# Patient Record
Sex: FEMALE | Race: OTHER | NOT HISPANIC OR LATINO | URBAN - METROPOLITAN AREA
[De-identification: names, ages, dates, MRNs, and addresses within clinical notes are randomized per-mention and may not be internally consistent; named-entity substitution may affect disease eponyms.]

---

## 2017-07-28 ENCOUNTER — OUTPATIENT (OUTPATIENT)
Dept: OUTPATIENT SERVICES | Facility: HOSPITAL | Age: 57
LOS: 1 days | End: 2017-07-28
Payer: MEDICAID

## 2017-07-28 VITALS
WEIGHT: 242.95 LBS | SYSTOLIC BLOOD PRESSURE: 146 MMHG | OXYGEN SATURATION: 96 % | RESPIRATION RATE: 17 BRPM | HEIGHT: 66 IN | DIASTOLIC BLOOD PRESSURE: 87 MMHG | HEART RATE: 62 BPM | TEMPERATURE: 98 F

## 2017-07-28 DIAGNOSIS — Z01.818 ENCOUNTER FOR OTHER PREPROCEDURAL EXAMINATION: ICD-10-CM

## 2017-07-28 DIAGNOSIS — M75.112 INCOMPLETE ROTATOR CUFF TEAR OR RUPTURE OF LEFT SHOULDER, NOT SPECIFIED AS TRAUMATIC: ICD-10-CM

## 2017-07-28 DIAGNOSIS — H33.21 SEROUS RETINAL DETACHMENT, RIGHT EYE: Chronic | ICD-10-CM

## 2017-07-28 DIAGNOSIS — Z98.89 OTHER SPECIFIED POSTPROCEDURAL STATES: Chronic | ICD-10-CM

## 2017-07-28 DIAGNOSIS — Z98.890 OTHER SPECIFIED POSTPROCEDURAL STATES: Chronic | ICD-10-CM

## 2017-07-28 PROCEDURE — 71046 X-RAY EXAM CHEST 2 VIEWS: CPT

## 2017-07-28 PROCEDURE — G0463: CPT

## 2017-07-28 PROCEDURE — 71020: CPT | Mod: 26

## 2017-07-28 NOTE — H&P PST ADULT - FAMILY HISTORY
Father  Still living? No  Family history of brain cancer, Age at diagnosis: Age Unknown     Mother  Still living? Yes, Estimated age: Age Unknown  Diabetes mellitus, type 2, Age at diagnosis: Age Unknown  Family history of hypertension, Age at diagnosis: Age Unknown

## 2017-07-28 NOTE — H&P PST ADULT - MS GEN HX ROS MEA POS PC
stiffness/myalgia/muscle weakness/muscle cramps/arthralgia/arthritis/left shoulder/joint swelling/joint pain

## 2017-07-28 NOTE — H&P PST ADULT - HISTORY OF PRESENT ILLNESS
56years old female with pmhx of ALKA on CPAP settings at 10, hypertension, hyperlipidemia, GERD, Obesity, Migraine Headaches,  multiple TIA's (x3, last one 3/2015) and ex cigarette smoker (10months) presents today with c/o of severe pain in left shoulder x 2years. " I don't know what to do to stop bone spurs that make me keep coming in to have more sugeries". Patient is here today for presurgical testing for scheduled left shoulder arthroscopy on 8/8/17

## 2017-07-28 NOTE — H&P PST ADULT - PMH
Anxiety    Bursitis    Carpal tunnel syndrome on left    Carpal tunnel syndrome, bilateral    Cataract    Depression    Hiatal hernia    HTN (hypertension)    Hyperlipemia    Insomnia    Migraine aura without headache    Morbidly obese    Ovarian cyst    Right retinal detachment    Rotator cuff tear, left    Sleep apnea    Stress incontinence, female    TIA (transient ischemic attack)  with slurred speach and blurred vision , possible migraines, pt under neurology care

## 2017-07-28 NOTE — H&P PST ADULT - PSH
H/O nasal septoplasty  2014  History of carpal tunnel surgery of left wrist  2/2017  History of hysterectomy  1994  History of shoulder surgery  left shoulder arthroscopy- 2010  History of total knee replacement  2011  RD (retinal detachment), right    S/P hernia repair  incarcerated hernia with mesh- May 2015  Status post rotator cuff repair  Left rotator 7/2016

## 2017-07-28 NOTE — H&P PST ADULT - NEGATIVE CARDIOVASCULAR SYMPTOMS
no claudication/no paroxysmal nocturnal dyspnea/no chest pain/no orthopnea/no dyspnea on exertion/no palpitations/no peripheral edema

## 2017-08-07 RX ORDER — SODIUM CHLORIDE 9 MG/ML
3 INJECTION INTRAMUSCULAR; INTRAVENOUS; SUBCUTANEOUS EVERY 8 HOURS
Qty: 0 | Refills: 0 | Status: DISCONTINUED | OUTPATIENT
Start: 2017-08-08 | End: 2017-08-08

## 2017-08-08 ENCOUNTER — OUTPATIENT (OUTPATIENT)
Dept: OUTPATIENT SERVICES | Facility: HOSPITAL | Age: 57
LOS: 1 days | Discharge: HOME | End: 2017-08-08
Payer: MEDICAID

## 2017-08-08 ENCOUNTER — TRANSCRIPTION ENCOUNTER (OUTPATIENT)
Age: 57
End: 2017-08-08

## 2017-08-08 ENCOUNTER — RESULT REVIEW (OUTPATIENT)
Age: 57
End: 2017-08-08

## 2017-08-08 VITALS
RESPIRATION RATE: 16 BRPM | HEART RATE: 74 BPM | DIASTOLIC BLOOD PRESSURE: 87 MMHG | SYSTOLIC BLOOD PRESSURE: 139 MMHG | OXYGEN SATURATION: 95 % | TEMPERATURE: 98 F

## 2017-08-08 VITALS
HEIGHT: 65 IN | TEMPERATURE: 98 F | SYSTOLIC BLOOD PRESSURE: 156 MMHG | WEIGHT: 242.95 LBS | DIASTOLIC BLOOD PRESSURE: 82 MMHG | HEART RATE: 70 BPM | OXYGEN SATURATION: 99 % | RESPIRATION RATE: 14 BRPM

## 2017-08-08 DIAGNOSIS — Z98.890 OTHER SPECIFIED POSTPROCEDURAL STATES: Chronic | ICD-10-CM

## 2017-08-08 DIAGNOSIS — M75.112 INCOMPLETE ROTATOR CUFF TEAR OR RUPTURE OF LEFT SHOULDER, NOT SPECIFIED AS TRAUMATIC: ICD-10-CM

## 2017-08-08 DIAGNOSIS — Z98.89 OTHER SPECIFIED POSTPROCEDURAL STATES: Chronic | ICD-10-CM

## 2017-08-08 DIAGNOSIS — H33.21 SEROUS RETINAL DETACHMENT, RIGHT EYE: Chronic | ICD-10-CM

## 2017-08-08 DIAGNOSIS — Z01.818 ENCOUNTER FOR OTHER PREPROCEDURAL EXAMINATION: ICD-10-CM

## 2017-08-08 PROCEDURE — 29828 SHO ARTHRS SRG BICP TENODSIS: CPT | Mod: LT

## 2017-08-08 PROCEDURE — 88304 TISSUE EXAM BY PATHOLOGIST: CPT

## 2017-08-08 PROCEDURE — 29823 SHO ARTHRS SRG XTNSV DBRDMT: CPT | Mod: AS,59

## 2017-08-08 PROCEDURE — 29821 SHO ARTHRS SRG COMPL SYNVCT: CPT | Mod: AS,59

## 2017-08-08 PROCEDURE — 29827 SHO ARTHRS SRG RT8TR CUF RPR: CPT | Mod: LT

## 2017-08-08 PROCEDURE — 29807 SHO ARTHRS SRG RPR SLAP LES: CPT | Mod: LT

## 2017-08-08 PROCEDURE — 29826 SHO ARTHRS SRG DECOMPRESSION: CPT | Mod: AS,LT

## 2017-08-08 PROCEDURE — 29826 SHO ARTHRS SRG DECOMPRESSION: CPT | Mod: LT

## 2017-08-08 PROCEDURE — 88304 TISSUE EXAM BY PATHOLOGIST: CPT | Mod: 26

## 2017-08-08 PROCEDURE — 23440 REMOVE/TRANSPLANT TENDON: CPT | Mod: AS,59

## 2017-08-08 PROCEDURE — 29820 SHO ARTHRS SRG PRTL SYNVCT: CPT | Mod: XS,LT

## 2017-08-08 RX ORDER — SODIUM CHLORIDE 9 MG/ML
1000 INJECTION, SOLUTION INTRAVENOUS
Qty: 0 | Refills: 0 | Status: DISCONTINUED | OUTPATIENT
Start: 2017-08-08 | End: 2017-08-16

## 2017-08-08 RX ORDER — ACETAMINOPHEN 500 MG
1000 TABLET ORAL ONCE
Qty: 0 | Refills: 0 | Status: COMPLETED | OUTPATIENT
Start: 2017-08-08 | End: 2017-08-08

## 2017-08-08 RX ORDER — ONDANSETRON 8 MG/1
4 TABLET, FILM COATED ORAL EVERY 6 HOURS
Qty: 0 | Refills: 0 | Status: DISCONTINUED | OUTPATIENT
Start: 2017-08-08 | End: 2017-08-16

## 2017-08-08 RX ORDER — OXYCODONE HYDROCHLORIDE 5 MG/1
1 TABLET ORAL
Qty: 40 | Refills: 0
Start: 2017-08-08

## 2017-08-08 RX ORDER — HYDROMORPHONE HYDROCHLORIDE 2 MG/ML
0.5 INJECTION INTRAMUSCULAR; INTRAVENOUS; SUBCUTANEOUS
Qty: 0 | Refills: 0 | Status: DISCONTINUED | OUTPATIENT
Start: 2017-08-08 | End: 2017-08-08

## 2017-08-08 RX ORDER — ONDANSETRON 8 MG/1
4 TABLET, FILM COATED ORAL ONCE
Qty: 0 | Refills: 0 | Status: DISCONTINUED | OUTPATIENT
Start: 2017-08-08 | End: 2017-08-08

## 2017-08-08 RX ADMIN — SODIUM CHLORIDE 100 MILLILITER(S): 9 INJECTION, SOLUTION INTRAVENOUS at 12:50

## 2017-08-08 RX ADMIN — Medication 1000 MILLIGRAM(S): at 12:53

## 2017-08-08 RX ADMIN — Medication 400 MILLIGRAM(S): at 12:23

## 2017-08-08 RX ADMIN — SODIUM CHLORIDE 3 MILLILITER(S): 9 INJECTION INTRAMUSCULAR; INTRAVENOUS; SUBCUTANEOUS at 07:59

## 2017-08-08 NOTE — BRIEF OPERATIVE NOTE - OPERATION/FINDINGS
Pre-op Diagnosis: LEFT SHOULDER LABRUM TEAR/BICEPS TEAR  Post-op Diagnosis: SAME    LEFT Shoulder arthroscopic debridement of labrum tears  Subacromial decompression  Acromioplasty

## 2017-08-08 NOTE — ASU DISCHARGE PLAN (ADULT/PEDIATRIC). - MEDICATION SUMMARY - MEDICATIONS TO TAKE
I will START or STAY ON the medications listed below when I get home from the hospital:    Imitrex 50 mg oral tablet  -- 1 tab(s) by mouth once, As Needed - for severe pain  -- Indication: For as per md    aspirin 81 mg oral tablet  -- 1 tab(s) by mouth once a day  -- Indication: For as per md    SUMAtriptan 50 mg oral tablet  -- 1 tab(s) by mouth once, As Needed  -- Indication: For as per md    acetaminophen-oxycodone 325 mg-5 mg oral tablet  -- 1-2  tab(s) by mouth every 6 hours, As Needed -for moderate pain MDD:6  -- Caution federal law prohibits the transfer of this drug to any person other  than the person for whom it was prescribed.  May cause drowsiness.  Alcohol may intensify this effect.  Use care when operating dangerous machinery.  This prescription cannot be refilled.  This product contains acetaminophen.  Do not use  with any other product containing acetaminophen to prevent possible liver damage.  Using more of this medication than prescribed may cause serious breathing problems.    -- Indication: For pain    benazepril 40 mg oral tablet  -- 1 tab(s) by mouth once a day  -- Indication: For htn    Topamax 50 mg oral tablet  -- 1 tab(s) by mouth once a day (at bedtime), As Needed  -- Indication: For as per md    traZODone 100 mg oral tablet  -- 1 tab(s) by mouth once a day (at bedtime), As Needed  -- Indication: For as per md    PROzac 40 mg oral capsule  -- 1 cap(s) by mouth once a day  -- Indication: For as per md    atorvastatin 10 mg oral tablet  -- 1 tab(s) by mouth once a day (at bedtime)  -- Indication: For as per md    Norvasc 10 mg oral tablet  -- 1 tab(s) by mouth once a day  -- Indication: For as per md    hydrochlorothiazide 25 mg oral tablet  -- 1 tab(s) by mouth once a day  -- Indication: For as per md    docusate sodium 100 mg oral capsule  -- 1 cap(s) by mouth 3 times a day  -- Indication: For as per md

## 2017-08-08 NOTE — ASU DISCHARGE PLAN (ADULT/PEDIATRIC). - NOTIFY
Numbness, color, or temperature change to extremity/Fever greater than 101/Bleeding that does not stop/Persistent Nausea and Vomiting/Swelling that continues/Numbness, tingling/Pain not relieved by Medications

## 2017-08-09 LAB — SURGICAL PATHOLOGY FINAL REPORT - CH: SIGNIFICANT CHANGE UP

## 2017-08-16 DIAGNOSIS — M75.42 IMPINGEMENT SYNDROME OF LEFT SHOULDER: ICD-10-CM
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
## 2017-08-16 DIAGNOSIS — M65.812 OTHER SYNOVITIS AND TENOSYNOVITIS, LEFT SHOULDER: ICD-10-CM

## 2017-08-16 DIAGNOSIS — M75.102 UNSPECIFIED ROTATOR CUFF TEAR OR RUPTURE OF LEFT SHOULDER, NOT SPECIFIED AS TRAUMATIC: ICD-10-CM
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
## 2017-08-16 DIAGNOSIS — S43.432A SUPERIOR GLENOID LABRUM LESION OF LEFT SHOULDER, INITIAL ENCOUNTER: ICD-10-CM
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
## 2017-08-16 DIAGNOSIS — M66.322 SPONTANEOUS RUPTURE OF FLEXOR TENDONS, LEFT UPPER ARM: ICD-10-CM

## 2017-08-16 DIAGNOSIS — M24.012 LOOSE BODY IN LEFT SHOULDER: ICD-10-CM
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
## 2017-11-09 ENCOUNTER — APPOINTMENT (OUTPATIENT)
Dept: ORTHOPEDIC SURGERY | Facility: CLINIC | Age: 57
End: 2017-11-09

## 2017-11-30 ENCOUNTER — APPOINTMENT (OUTPATIENT)
Dept: ORTHOPEDIC SURGERY | Facility: CLINIC | Age: 57
End: 2017-11-30

## 2017-12-07 ENCOUNTER — APPOINTMENT (OUTPATIENT)
Dept: ORTHOPEDIC SURGERY | Facility: CLINIC | Age: 57
End: 2017-12-07
Payer: MEDICARE

## 2017-12-07 VITALS
HEART RATE: 96 BPM | WEIGHT: 255 LBS | HEIGHT: 65 IN | BODY MASS INDEX: 42.49 KG/M2 | DIASTOLIC BLOOD PRESSURE: 90 MMHG | SYSTOLIC BLOOD PRESSURE: 159 MMHG

## 2017-12-07 VITALS — HEIGHT: 65 IN | BODY MASS INDEX: 42.49 KG/M2 | WEIGHT: 255 LBS

## 2017-12-07 DIAGNOSIS — Z87.39 PERSONAL HISTORY OF OTHER DISEASES OF THE MUSCULOSKELETAL SYSTEM AND CONNECTIVE TISSUE: ICD-10-CM

## 2017-12-07 DIAGNOSIS — Z86.73 PERSONAL HISTORY OF TRANSIENT ISCHEMIC ATTACK (TIA), AND CEREBRAL INFARCTION W/OUT RESIDUAL DEFICITS: ICD-10-CM

## 2017-12-07 DIAGNOSIS — M25.512 PAIN IN LEFT SHOULDER: ICD-10-CM

## 2017-12-07 DIAGNOSIS — Z86.39 PERSONAL HISTORY OF OTHER ENDOCRINE, NUTRITIONAL AND METABOLIC DISEASE: ICD-10-CM

## 2017-12-07 DIAGNOSIS — G89.29 PAIN IN LEFT SHOULDER: ICD-10-CM

## 2017-12-07 DIAGNOSIS — Z86.69 PERSONAL HISTORY OF OTHER DISEASES OF THE NERVOUS SYSTEM AND SENSE ORGANS: ICD-10-CM

## 2017-12-07 PROCEDURE — 73030 X-RAY EXAM OF SHOULDER: CPT | Mod: LT

## 2017-12-07 PROCEDURE — 99203 OFFICE O/P NEW LOW 30 MIN: CPT

## 2018-02-07 ENCOUNTER — APPOINTMENT (OUTPATIENT)
Dept: PHYSICAL MEDICINE AND REHAB | Facility: CLINIC | Age: 58
End: 2018-02-07

## 2018-02-21 ENCOUNTER — APPOINTMENT (OUTPATIENT)
Dept: PHYSICAL MEDICINE AND REHAB | Facility: CLINIC | Age: 58
End: 2018-02-21
Payer: MEDICARE

## 2018-02-21 VITALS — WEIGHT: 260 LBS | BODY MASS INDEX: 43.32 KG/M2 | HEIGHT: 65 IN

## 2018-02-21 DIAGNOSIS — Z78.9 OTHER SPECIFIED HEALTH STATUS: ICD-10-CM

## 2018-02-21 DIAGNOSIS — Z56.0 UNEMPLOYMENT, UNSPECIFIED: ICD-10-CM

## 2018-02-21 DIAGNOSIS — Z80.9 FAMILY HISTORY OF MALIGNANT NEOPLASM, UNSPECIFIED: ICD-10-CM

## 2018-02-21 DIAGNOSIS — M16.12 UNILATERAL PRIMARY OSTEOARTHRITIS, LEFT HIP: ICD-10-CM

## 2018-02-21 PROCEDURE — 99214 OFFICE O/P EST MOD 30 MIN: CPT

## 2018-02-21 RX ORDER — OMEPRAZOLE 40 MG/1
40 CAPSULE, DELAYED RELEASE ORAL
Qty: 30 | Refills: 0 | Status: ACTIVE | COMMUNITY
Start: 2018-01-30

## 2018-02-21 SDOH — ECONOMIC STABILITY - INCOME SECURITY: UNEMPLOYMENT, UNSPECIFIED: Z56.0

## 2018-02-23 ENCOUNTER — APPOINTMENT (OUTPATIENT)
Dept: PHYSICAL MEDICINE AND REHAB | Facility: CLINIC | Age: 58
End: 2018-02-23
Payer: MEDICARE

## 2018-02-23 VITALS — BODY MASS INDEX: 42.49 KG/M2 | HEART RATE: 80 BPM | HEIGHT: 65 IN | WEIGHT: 255 LBS

## 2018-02-23 PROCEDURE — 20611 DRAIN/INJ JOINT/BURSA W/US: CPT | Mod: LT

## 2018-06-13 ENCOUNTER — APPOINTMENT (OUTPATIENT)
Dept: PHYSICAL MEDICINE AND REHAB | Facility: CLINIC | Age: 58
End: 2018-06-13
Payer: MEDICARE

## 2018-06-13 VITALS — HEART RATE: 60 BPM | HEIGHT: 65 IN | BODY MASS INDEX: 39.99 KG/M2 | OXYGEN SATURATION: 98 % | WEIGHT: 240 LBS

## 2018-06-13 PROCEDURE — 99214 OFFICE O/P EST MOD 30 MIN: CPT

## 2018-06-22 ENCOUNTER — APPOINTMENT (OUTPATIENT)
Dept: PHYSICAL MEDICINE AND REHAB | Facility: CLINIC | Age: 58
End: 2018-06-22
Payer: MEDICARE

## 2018-06-22 PROCEDURE — 20611 DRAIN/INJ JOINT/BURSA W/US: CPT | Mod: LT

## 2018-07-26 PROBLEM — Z86.73 HISTORY OF STROKE: Status: RESOLVED | Noted: 2017-12-07 | Resolved: 2018-07-26

## 2019-08-30 PROBLEM — G56.02 CARPAL TUNNEL SYNDROME, LEFT UPPER LIMB: Chronic | Status: ACTIVE | Noted: 2017-07-28

## 2019-08-30 PROBLEM — N83.209 UNSPECIFIED OVARIAN CYST, UNSPECIFIED SIDE: Chronic | Status: ACTIVE | Noted: 2017-07-28

## 2019-08-30 PROBLEM — H26.9 UNSPECIFIED CATARACT: Chronic | Status: ACTIVE | Noted: 2017-07-28

## 2019-08-30 PROBLEM — H33.21 SEROUS RETINAL DETACHMENT, RIGHT EYE: Chronic | Status: ACTIVE | Noted: 2017-07-28

## 2019-08-30 PROBLEM — N39.3 STRESS INCONTINENCE (FEMALE) (MALE): Chronic | Status: ACTIVE | Noted: 2017-07-28

## 2019-08-30 PROBLEM — M75.102 UNSPECIFIED ROTATOR CUFF TEAR OR RUPTURE OF LEFT SHOULDER, NOT SPECIFIED AS TRAUMATIC: Chronic | Status: ACTIVE | Noted: 2017-07-28

## 2019-08-30 PROBLEM — G47.00 INSOMNIA, UNSPECIFIED: Chronic | Status: ACTIVE | Noted: 2017-07-28

## 2019-08-30 PROBLEM — M71.9 BURSOPATHY, UNSPECIFIED: Chronic | Status: ACTIVE | Noted: 2017-07-28

## 2019-09-11 ENCOUNTER — APPOINTMENT (OUTPATIENT)
Dept: UROGYNECOLOGY | Facility: CLINIC | Age: 59
End: 2019-09-11
Payer: MEDICARE

## 2019-09-11 VITALS
BODY MASS INDEX: 40.18 KG/M2 | DIASTOLIC BLOOD PRESSURE: 83 MMHG | SYSTOLIC BLOOD PRESSURE: 133 MMHG | WEIGHT: 250 LBS | HEART RATE: 50 BPM | HEIGHT: 66 IN

## 2019-09-11 DIAGNOSIS — N39.46 MIXED INCONTINENCE: ICD-10-CM

## 2019-09-11 DIAGNOSIS — G47.30 SLEEP APNEA, UNSPECIFIED: ICD-10-CM

## 2019-09-11 DIAGNOSIS — N39.44 NOCTURNAL ENURESIS: ICD-10-CM

## 2019-09-11 DIAGNOSIS — N32.81 OVERACTIVE BLADDER: ICD-10-CM

## 2019-09-11 LAB
BILIRUB UR QL STRIP: NORMAL
CLARITY UR: CLEAR
COLLECTION METHOD: NORMAL
GLUCOSE UR-MCNC: NORMAL
HCG UR QL: 0.2 EU/DL
HGB UR QL STRIP.AUTO: NORMAL
KETONES UR-MCNC: NORMAL
LEUKOCYTE ESTERASE UR QL STRIP: NORMAL
NITRITE UR QL STRIP: NORMAL
PH UR STRIP: 7
PROT UR STRIP-MCNC: NORMAL
SP GR UR STRIP: 1.02

## 2019-09-11 PROCEDURE — 81003 URINALYSIS AUTO W/O SCOPE: CPT | Mod: NC,QW

## 2019-09-11 PROCEDURE — 99204 OFFICE O/P NEW MOD 45 MIN: CPT

## 2019-09-11 RX ORDER — LIFITEGRAST 50 MG/ML
5 SOLUTION/ DROPS OPHTHALMIC
Refills: 0 | Status: ACTIVE | COMMUNITY

## 2019-09-11 NOTE — HISTORY OF PRESENT ILLNESS
[Cystocele (Obstetric)] : daily [Uterine Prolapse] : none [Vaginal Wall Prolapse] : rare [Rectal Prolapse] : none [Urge Incontinence Of Urine] : none [Stress Incontinence] : daily [Unable To Restrain Bowel Movement] : daily [Feelings Of Urinary Urgency] : none [Urinary Frequency] : none [x3+] : three or more  times a night [Hematuria] : rare [] : months ago [Pain During Urination (Dysuria)] : none [Urinary Tract Infection] : rare [Incomplete Emptying Of Stool] : none [Constipation Obstructed Defecation] : none [de-identified] : 22 [FreeTextEntry4] : infrequent vaginal bleeding with intercourse [de-identified] : 1x [de-identified] : 6 [de-identified] : 1 [de-identified] : 6 [de-identified] : 4x day [de-identified] : keeps one pad / alee in bed at night [de-identified] : 6 times [de-identified] : 6 [de-identified] : 8x per night [FreeTextEntry1] : 58 yo P0  presenting as a new patient for incontinence and urgency for past 6mo-1year.  Primarily describes symptoms of urgency with leakage of urine daily, nocturnal enuresis nightly with nocturia up to 8 times per night.  Walks with cane but denies difficulty getting to bathroom at night.  Secondary complaints of ANTHONY, and POP less bothersome.\par \par Reviewed bladder diary, 32oz water daily, 16oz caffeine tea or coffee, some juice 8-16oz daily\par \par PMH: Arthritis, HTN, HLD, TIA 2015, Depression, ALKA, Obesity\par PSH: Abdominal ovarian cystectomy 1978, Exlap 1992 and 1993 with plan for ZOILA aborted secondary to adhesions, ZOILA BSO 1994, ventral hernia repair 2015\par SH: Single, rare etoh use, nonsmoker, retired

## 2019-09-11 NOTE — DISCUSSION/SUMMARY
[FreeTextEntry1] : 60 yo F presenting with OAB, DOLORES and nocturnal enuresis, primary concern is her symptoms of OAB.  No prolapse noted on exam.\par \par We discussed possible etiologies of her symptoms including overactive bladder. I recommend she start behavioral modifications and bladder training. Written instructions on how to perform bladder training were provided. We reviewed medications for overactive bladder.  Patient to additionally start trial of oxybutynin, prescription previously sent by her primary obgyn. Pt asked to bring in bottle next visit to check dose.  Risks and side effects reviewed extensively. \par \par For patient's ANTHONY we discussed management options including observation, pelvic floor exercises with or without physical therapy, pessary, impressa, medications including imipramine and surgical management.\par .At this time the patient elects for: Pelvic Floor Strengthening on her own (instructions provided)\par \par Postmenopausal bleeding likely secondary to vaginal atrophy however strongly recommended patient follow up with her primary obgyn for further evaluation.\par IUGA OAB and Kegel instructions given.  She will RTO in 4 weeks for follow up or sooner if issues arise. All questions answered

## 2019-09-11 NOTE — PHYSICAL EXAM
[No Acute Distress] : in no acute distress [Well developed] : well developed [Well Nourished] : ~L well nourished [Good Hygeine] : demonstrates good hygeine [Oriented x3] : oriented to person, place, and time [Normal Memory] : ~T memory was ~L unimpaired [Normal Mood/Affect] : mood and affect are normal [Warm and Dry] : was warm and dry to touch [Turgor Normal] : skin turgor ~T was normal [Normal] : normal external genitalia [No Lesions] : no lesions were seen on the external genitalia [Labia Majora] : were normal [Labia Minora] : were normal [Normal Appearance] : general appearance was normal [Atrophy] : atrophy [No Bleeding] : there was no active vaginal bleeding [Scar] : a scar was noted [Mid-line] : in the mid-line [1] : 1 [Absent] : absent [Post Void Residual ____ml] : post void residual via catheterization was [unfilled] ml [Normal rectal exam] : was normal [Mass (___ Cm)] : no ~M [unfilled] abdominal mass was palpated [Tenderness] : ~T no ~M abdominal tenderness observed [Distended] : not distended [H/Smegaly] : no hepatosplenomegaly [Inguinal LAD] : no adenopathy was noted in the inguinal lymph nodes [Normal Gait] : gait was abnormal [de-identified] : No prolapse

## 2019-10-08 ENCOUNTER — EMERGENCY (EMERGENCY)
Facility: HOSPITAL | Age: 59
LOS: 1 days | Discharge: HOME | End: 2019-10-08
Attending: EMERGENCY MEDICINE | Admitting: EMERGENCY MEDICINE
Payer: MEDICARE

## 2019-10-08 VITALS
RESPIRATION RATE: 18 BRPM | HEART RATE: 86 BPM | SYSTOLIC BLOOD PRESSURE: 155 MMHG | DIASTOLIC BLOOD PRESSURE: 97 MMHG | OXYGEN SATURATION: 99 % | TEMPERATURE: 100 F

## 2019-10-08 DIAGNOSIS — Z98.890 OTHER SPECIFIED POSTPROCEDURAL STATES: Chronic | ICD-10-CM

## 2019-10-08 DIAGNOSIS — H33.21 SEROUS RETINAL DETACHMENT, RIGHT EYE: Chronic | ICD-10-CM

## 2019-10-08 DIAGNOSIS — Z98.89 OTHER SPECIFIED POSTPROCEDURAL STATES: Chronic | ICD-10-CM

## 2019-10-08 PROCEDURE — 73502 X-RAY EXAM HIP UNI 2-3 VIEWS: CPT | Mod: 26,RT

## 2019-10-08 PROCEDURE — 99285 EMERGENCY DEPT VISIT HI MDM: CPT

## 2019-10-08 RX ORDER — IBUPROFEN 200 MG
600 TABLET ORAL ONCE
Refills: 0 | Status: COMPLETED | OUTPATIENT
Start: 2019-10-08 | End: 2019-10-08

## 2019-10-08 RX ORDER — CYCLOBENZAPRINE HYDROCHLORIDE 10 MG/1
10 TABLET, FILM COATED ORAL ONCE
Refills: 0 | Status: COMPLETED | OUTPATIENT
Start: 2019-10-08 | End: 2019-10-08

## 2019-10-08 RX ORDER — MORPHINE SULFATE 50 MG/1
15 CAPSULE, EXTENDED RELEASE ORAL ONCE
Refills: 0 | Status: DISCONTINUED | OUTPATIENT
Start: 2019-10-08 | End: 2019-10-08

## 2019-10-08 RX ORDER — LIDOCAINE 4 G/100G
1 CREAM TOPICAL ONCE
Refills: 0 | Status: COMPLETED | OUTPATIENT
Start: 2019-10-08 | End: 2019-10-08

## 2019-10-08 RX ORDER — ACETAMINOPHEN 500 MG
650 TABLET ORAL ONCE
Refills: 0 | Status: COMPLETED | OUTPATIENT
Start: 2019-10-08 | End: 2019-10-08

## 2019-10-08 RX ADMIN — Medication 650 MILLIGRAM(S): at 21:45

## 2019-10-08 RX ADMIN — Medication 600 MILLIGRAM(S): at 21:14

## 2019-10-08 RX ADMIN — MORPHINE SULFATE 15 MILLIGRAM(S): 50 CAPSULE, EXTENDED RELEASE ORAL at 23:19

## 2019-10-08 RX ADMIN — Medication 600 MILLIGRAM(S): at 21:45

## 2019-10-08 RX ADMIN — Medication 650 MILLIGRAM(S): at 21:14

## 2019-10-08 RX ADMIN — CYCLOBENZAPRINE HYDROCHLORIDE 10 MILLIGRAM(S): 10 TABLET, FILM COATED ORAL at 21:14

## 2019-10-08 RX ADMIN — LIDOCAINE 1 PATCH: 4 CREAM TOPICAL at 21:14

## 2019-10-08 NOTE — ED ADULT NURSE NOTE - NSIMPLEMENTINTERV_GEN_ALL_ED
Implemented All Fall Risk Interventions:  [place] to call system. Call bell, personal items and telephone within reach. Instruct patient to call for assistance. Room bathroom lighting operational. Non-slip footwear when patient is off stretcher. Physically safe environment: no spills, clutter or unnecessary equipment. Stretcher in lowest position, wheels locked, appropriate side rails in place. Provide visual cue, wrist band, yellow gown, etc. Monitor gait and stability. Monitor for mental status changes and reorient to person, place, and time. Review medications for side effects contributing to fall risk. Reinforce activity limits and safety measures with patient and family.

## 2019-10-08 NOTE — ED ADULT NURSE NOTE - OBJECTIVE STATEMENT
Received pt in intake room 5. Pt A&OX3. Pt brought in by EMS complaining of R hip pain since Sunday. Pt states she fell on Sunday landing her R side. Pt reports the pain worsened today, unable to ambulate due to pain. No obvious injuries/deformities/redness/bruising noted to right hip. Pt appears uncomfortable but in no apparent distress. respirations are equal and nonlabored, no respiratory distress noted. denies any other medical complaints. denies sob, n/v/d, fever/chills, dizziness, headache. Pt stable, medicated as per orders. awaiting x ray

## 2019-10-08 NOTE — ED PROVIDER NOTE - OBJECTIVE STATEMENT
58yo F with PMHx obesity, b/l hip osteoarthritis (no prior joint replacement), HTN, and HLD, p/w R hip pain and inability to ambulate after fall 2 days ago.  Says she slept on hard sofa on R hip then fell off sofa onto R side 2 days ago, no head trauma, was able to walk that day had no pain, then yesterday had some R hip pain in morning then today had more R hip pain and unable to walk 2/2 pain.  No other complaints or injuries.

## 2019-10-08 NOTE — ED PROVIDER NOTE - PHYSICAL EXAMINATION
General: Patient in no apparent distress, AAO x 3, obese female  Skin: Dry and intact  HEENT: Oral mucosa moist.   Eyes: Conjunctiva normal  Cardiac: Regular rate and rhythm, 2+ DP pulse in RLE  Respiratory: Lungs clear b/l and symmetric. No respiratory distress  Gastrointestinal: Abdomen soft, nondistended, nontender  Musculoskeletal: Moves all extremities spontaneously. +ttp to R greater trochanter and limited R hip ROM 2/2 pain, no swelling or redness to R knee or ecchymoses  Neurological: alert and oriented to personal, place and time  Psychiatric: Cooperative

## 2019-10-08 NOTE — ED PROVIDER NOTE - CLINICAL SUMMARY MEDICAL DECISION MAKING FREE TEXT BOX
58yo F with PMHx obesity, b/l hip osteoarthritis, HTN, and HLD, p/w R hip pain and inability to ambulate after fall 2 days ago off of cough onto R hip.  Initially was able to walk, now cannot d/t pain.  limited hip ROM 2/2 pain but no bruising or swelling. a/p concern for traumatic bursitis or MSK injury, fracuter less likely. not on steroids.  XR hip, pelvis XR.  motrin, tylenol, flexeril, lidoderm patch for pain then will re-assess

## 2019-10-08 NOTE — ED PROVIDER NOTE - NS ED ROS FT
Constitutional: No weakness or fatigue, no fevers or chills  Skin: No rash or pruritis  HEENT: No sore throat  Cardiovascular: No chest pain, no cough, no shortness of breath  Respiratory: No shortness of breath, no cough  Gastrointestinal: No abdominal pain, no nausea, no vomiting, no diarrhea, no constipation  Musculoskeletal: +right hip pain  Genitourinary: No dysuria   Neurological: No weakness, no headache, no tingling

## 2019-10-08 NOTE — ED ADULT TRIAGE NOTE - CHIEF COMPLAINT QUOTE
Pt brought in by EMS complaining of R hip pain since Sunday. Pt states she fell on Sunday landing her R side. Pt states the pain became worse today.

## 2019-10-08 NOTE — ED PROVIDER NOTE - PROGRESS NOTE DETAILS
LUANN- patient with minimally improved pain after meds, says if initially she was 10/10 pain, she is now 8/10.  Will order PO morphine then re-assess.  XRAYS with no acute abnormalities LUANN- patient rates pain at 5/10 and able to walk with cane (baseline she says) but with antalgic gait.  Says she lives alone with older mother but concerned about managing pain at home. will transition to observation unit

## 2019-10-09 VITALS
HEART RATE: 62 BPM | SYSTOLIC BLOOD PRESSURE: 154 MMHG | DIASTOLIC BLOOD PRESSURE: 75 MMHG | TEMPERATURE: 98 F | OXYGEN SATURATION: 99 % | RESPIRATION RATE: 15 BRPM

## 2019-10-09 PROCEDURE — 99236 HOSP IP/OBS SAME DATE HI 85: CPT

## 2019-10-09 RX ORDER — OXYCODONE HYDROCHLORIDE 5 MG/1
1 TABLET ORAL
Qty: 6 | Refills: 0
Start: 2019-10-09 | End: 2019-10-10

## 2019-10-09 RX ORDER — MORPHINE SULFATE 50 MG/1
15 CAPSULE, EXTENDED RELEASE ORAL ONCE
Refills: 0 | Status: DISCONTINUED | OUTPATIENT
Start: 2019-10-09 | End: 2019-10-09

## 2019-10-09 RX ORDER — CYCLOBENZAPRINE HYDROCHLORIDE 10 MG/1
10 TABLET, FILM COATED ORAL EVERY 8 HOURS
Refills: 0 | Status: DISCONTINUED | OUTPATIENT
Start: 2019-10-09 | End: 2019-10-19

## 2019-10-09 RX ORDER — OXYCODONE AND ACETAMINOPHEN 5; 325 MG/1; MG/1
1 TABLET ORAL EVERY 4 HOURS
Refills: 0 | Status: DISCONTINUED | OUTPATIENT
Start: 2019-10-09 | End: 2019-10-09

## 2019-10-09 RX ORDER — IBUPROFEN 200 MG
800 TABLET ORAL EVERY 6 HOURS
Refills: 0 | Status: DISCONTINUED | OUTPATIENT
Start: 2019-10-09 | End: 2019-10-19

## 2019-10-09 RX ADMIN — CYCLOBENZAPRINE HYDROCHLORIDE 10 MILLIGRAM(S): 10 TABLET, FILM COATED ORAL at 05:52

## 2019-10-09 RX ADMIN — Medication 800 MILLIGRAM(S): at 05:51

## 2019-10-09 RX ADMIN — CYCLOBENZAPRINE HYDROCHLORIDE 10 MILLIGRAM(S): 10 TABLET, FILM COATED ORAL at 14:04

## 2019-10-09 RX ADMIN — Medication 800 MILLIGRAM(S): at 13:02

## 2019-10-09 RX ADMIN — Medication 800 MILLIGRAM(S): at 06:51

## 2019-10-09 RX ADMIN — Medication 800 MILLIGRAM(S): at 12:02

## 2019-10-09 RX ADMIN — LIDOCAINE 1 PATCH: 4 CREAM TOPICAL at 07:10

## 2019-10-09 RX ADMIN — LIDOCAINE 1 PATCH: 4 CREAM TOPICAL at 09:58

## 2019-10-09 NOTE — ED CDU PROVIDER DISPOSITION NOTE - CLINICAL COURSE
Patient signed out to me. 60 yo F with hx of HTN, obesity, depression, multiple MSK problems here for right hip pain. Patient fell 2 days ago, fell off of a sofa onto the ground. Pain with movement. XR neg for acute pathology. Patient in CDU for pain control, PT eval. Patient receiving ibuprofen and flexaril. Patient seen by PT, recommend rolling walker and outpatient physical therapy. Patient given script for walker and discharged with pain meds.

## 2019-10-09 NOTE — ED CDU PROVIDER INITIAL DAY NOTE - ATTENDING CONTRIBUTION TO CARE
58yo F with PMHx obesity, b/l hip osteoarthritis (no prior joint replacement), HTN, and HLD, p/w R hip pain and inability to ambulate after fall 2 days ago.  Says she slept on hard sofa on R hip then fell off sofa onto R side 2 days ago, no head trauma, was able to walk that day had no pain, then yesterday had some R hip pain in morning then today had more R hip pain and unable to walk 2/2 pain.  No other complaints or injuries. EXAM ttp to R greater trochanter with limited ROM 2/2 pain but no overlying skin changes. XRays with no acute abnormalities. likely MSK pain/traumatic bursitis. improved pain and ROM since ED visit and receiving pain meds but needs more pain management. Able to walk. CDU to provide more pain management and time

## 2019-10-09 NOTE — PROVIDER CONTACT NOTE (OTHER) - BACKGROUND
Pt has been medicaly cleared as per CDU PA.  ED SW assisted with cab to home.  Pt was in agreement with discharge plan. Pt has been medically cleared as per CDU PA.  ED SW assisted with cab to home.  Pt was in agreement with discharge plan.

## 2019-10-09 NOTE — ED CDU PROVIDER DISPOSITION NOTE - NSFOLLOWUPINSTRUCTIONS_ED_ALL_ED_FT
Follow up with your PMD DR German within 1 week. Call for appointment - [phone]. Bring your results with you to MD appointment. Stay hydrated and get plenty of rest. Follow up with physical therapy as out patient. Return to ER if symptoms return or worsen or if other concerns arise.

## 2019-10-09 NOTE — ED CDU PROVIDER INITIAL DAY NOTE - MUSCULOSKELETAL, MLM
Range of motion limited due to stated right hip discomfort with ROM; subjective TTP to lateral hip region.

## 2019-10-09 NOTE — ED CDU PROVIDER DISPOSITION NOTE - PATIENT PORTAL LINK FT
You can access the FollowMyHealth Patient Portal offered by [facility] by registering at the following website: [email or web address]. By joining "[employer]’s FollowMyHealth portal, you will also be able to view your health information using other applications (apps) compatible with our system.

## 2019-10-09 NOTE — ED CDU PROVIDER INITIAL DAY NOTE - OBJECTIVE STATEMENT
58yo F with PMHx obesity, b/l hip osteoarthritis (no prior joint replacement), HTN, and HLD, p/w R hip pain and inability to ambulate after fall 2 days ago.  Says she slept on hard sofa on R hip then fell off sofa onto R side 2 days ago, no head trauma, was able to walk that day had no pain, then yesterday had some R hip pain in morning then today had more R hip pain and unable to walk 2/2 pain.  No other complaints or injuries.    CDU ALEX Matthews Note----  Hx as per above.  In the ED, x-rays performed and reviewed by ED attending; no acute pathology noted.  Pt given analgesia in the ED with some improvement of symptoms but pt had verbalized concern for managing pain at home; pt was dispo'd to CDU for continued supportive care with goal for improved mobility and comfort.  On CDU arrival, no active c/o other than pain with ROM, but symptoms overall improved vs in initial arrival in the ED.

## 2019-10-09 NOTE — ED CDU PROVIDER DISPOSITION NOTE - PLAN OF CARE
Resolution of symptoms Follow up with your PMD DR German within 1 week. Call for appointment - [phone]. Bring your results with you to MD appointment. Stay hydrated and get plenty of rest. Follow up with physical therapy as out patient. Return to ER if symptoms return or worsen or if other concerns arise.

## 2019-10-09 NOTE — ED CDU PROVIDER INITIAL DAY NOTE - INDICATION FOR OBSERVATION
Other/Therapeutic Intensity/Supportive care, analgesia per orders, general observation care / monitoring.

## 2019-10-09 NOTE — ED CDU PROVIDER INITIAL DAY NOTE - CONSTITUTIONAL, MLM
normal... Obese, well appearing, well nourished, awake, alert, oriented to person, place, time/situation and in no apparent distress.  Pt verbalizing clearly and effortlessly.

## 2019-10-09 NOTE — ED CDU PROVIDER INITIAL DAY NOTE - PROGRESS NOTE DETAILS
Patient states she still has right hip pain. States it is not much improved.  Exam: heart - RRR s1s2 nl Lungs - clear bilaterally abd - soft obese ND extrem- tenderness over right hip region. Plan for PT eval today. Pain medication as needed. Continue to monitor. Patient signed out to me. 58 yo F with hx of HTN, obesity, depression, multiple MSK problems here for right hip pain. Patient fell 2 days ago, fell off of a sofa onto the ground. Pain with movement. XR neg for acute pathology. Patient in CDU for pain control, PT eval. Patient receiving ibuprofen and flexaril. Patient seen by PT, recommend rolling walker and outpatient physical therapy. Patient given script and discharged.

## 2019-10-16 ENCOUNTER — APPOINTMENT (OUTPATIENT)
Dept: UROGYNECOLOGY | Facility: CLINIC | Age: 59
End: 2019-10-16

## 2019-11-21 ENCOUNTER — APPOINTMENT (OUTPATIENT)
Dept: PHYSICAL MEDICINE AND REHAB | Facility: CLINIC | Age: 59
End: 2019-11-21

## 2019-11-27 ENCOUNTER — OUTPATIENT (OUTPATIENT)
Dept: OUTPATIENT SERVICES | Facility: HOSPITAL | Age: 59
LOS: 1 days | End: 2019-11-27
Payer: MEDICARE

## 2019-11-27 VITALS
OXYGEN SATURATION: 97 % | DIASTOLIC BLOOD PRESSURE: 88 MMHG | RESPIRATION RATE: 17 BRPM | TEMPERATURE: 98 F | WEIGHT: 255.07 LBS | HEIGHT: 66 IN | SYSTOLIC BLOOD PRESSURE: 158 MMHG | HEART RATE: 62 BPM

## 2019-11-27 DIAGNOSIS — I10 ESSENTIAL (PRIMARY) HYPERTENSION: ICD-10-CM

## 2019-11-27 DIAGNOSIS — Z98.890 OTHER SPECIFIED POSTPROCEDURAL STATES: Chronic | ICD-10-CM

## 2019-11-27 DIAGNOSIS — J40 BRONCHITIS, NOT SPECIFIED AS ACUTE OR CHRONIC: ICD-10-CM

## 2019-11-27 DIAGNOSIS — R51 HEADACHE: ICD-10-CM

## 2019-11-27 DIAGNOSIS — M19.012 PRIMARY OSTEOARTHRITIS, LEFT SHOULDER: ICD-10-CM

## 2019-11-27 DIAGNOSIS — Z98.89 OTHER SPECIFIED POSTPROCEDURAL STATES: Chronic | ICD-10-CM

## 2019-11-27 DIAGNOSIS — Z01.818 ENCOUNTER FOR OTHER PREPROCEDURAL EXAMINATION: ICD-10-CM

## 2019-11-27 DIAGNOSIS — H33.21 SEROUS RETINAL DETACHMENT, RIGHT EYE: Chronic | ICD-10-CM

## 2019-11-27 LAB — BLD GP AB SCN SERPL QL: SIGNIFICANT CHANGE UP

## 2019-11-27 PROCEDURE — G0463: CPT

## 2019-11-27 NOTE — H&P PST ADULT - NSICDXPASTMEDICALHX_GEN_ALL_CORE_FT
PAST MEDICAL HISTORY:  Anxiety     Bursitis     Carpal tunnel syndrome on left     Carpal tunnel syndrome, bilateral     Cataract     Depression     H/O chronic bronchitis     Hiatal hernia     HTN (hypertension)     Hyperlipemia     Insomnia     Migraine aura without headache     Morbidly obese     Ovarian cyst     Right retinal detachment     Rotator cuff tear, left     Sleep apnea     Stress incontinence, female     TIA (transient ischemic attack) with slurred speach and blurred vision , possible migraines, pt under neurology care

## 2019-11-27 NOTE — H&P PST ADULT - VENOUS THROMBOEMBOLISM CURRENT STATUS
(1) abnormal pulmonary function (COPD)/(1) serious lung disease, including pneumonia (within 1 month)

## 2019-11-27 NOTE — H&P PST ADULT - NSICDXFAMILYHX_GEN_ALL_CORE_FT
FAMILY HISTORY:  Father  Still living? No  Family history of brain cancer, Age at diagnosis: Age Unknown    Mother  Still living? Yes, Estimated age: Age Unknown  Diabetes mellitus, type 2, Age at diagnosis: Age Unknown  Family history of hypertension, Age at diagnosis: Age Unknown

## 2019-11-27 NOTE — H&P PST ADULT - NSICDXPASTSURGICALHX_GEN_ALL_CORE_FT
PAST SURGICAL HISTORY:  H/O nasal septoplasty 2014    History of carpal tunnel surgery of left wrist 2/2017    History of hysterectomy 1994    History of shoulder surgery left shoulder arthroscopy- 2010    History of total knee replacement 2011    RD (retinal detachment), right     S/P hernia repair incarcerated hernia with mesh- May 2015    Status post rotator cuff repair Left rotator 7/2016

## 2019-11-27 NOTE — H&P PST ADULT - NSICDXPROBLEM_GEN_ALL_CORE_FT
PROBLEM DIAGNOSES  Problem: Primary osteoarthritis, left shoulder  Assessment and Plan:  is scheduled for Left Shoulder Arthroscopy  Possible Open Biceps Tenodesis  Arthroscopy Shoulder Biceps Tenodesis      Problem: Bronchitis  Assessment and Plan: Continue your medication as needed    Problem: Headache  Assessment and Plan: Carlos nue your medicationn as prescribed    Problem: Hypertension  Assessment and Plan: Continue your medication as prescribed

## 2019-11-27 NOTE — H&P PST ADULT - ASSESSMENT
60 yo female is scheduled for Left Shoulder Arthroscopy  Possible Open Biceps Tenodesis  Arthroscopy Shoulder Biceps Tenodesis on 12/3/19

## 2019-11-27 NOTE — H&P PST ADULT - HISTORY OF PRESENT ILLNESS
60 yo female with history of HTN, , HA, Insomnia, HLD, reports the above.  She is scheduled for Left Shoulder Arthroscopy  Possible Open Biceps Tenodesis  Arthroscopy Shoulder Biceps Tenodesis on 12/3/19

## 2019-12-02 ENCOUNTER — TRANSCRIPTION ENCOUNTER (OUTPATIENT)
Age: 59
End: 2019-12-02

## 2019-12-03 ENCOUNTER — RESULT REVIEW (OUTPATIENT)
Age: 59
End: 2019-12-03

## 2019-12-03 ENCOUNTER — OUTPATIENT (OUTPATIENT)
Dept: OUTPATIENT SERVICES | Facility: HOSPITAL | Age: 59
LOS: 1 days | End: 2019-12-03
Payer: MEDICARE

## 2019-12-03 VITALS
HEIGHT: 66 IN | HEART RATE: 49 BPM | RESPIRATION RATE: 16 BRPM | SYSTOLIC BLOOD PRESSURE: 185 MMHG | TEMPERATURE: 99 F | OXYGEN SATURATION: 98 % | DIASTOLIC BLOOD PRESSURE: 89 MMHG | WEIGHT: 255.07 LBS

## 2019-12-03 VITALS
OXYGEN SATURATION: 96 % | HEART RATE: 66 BPM | DIASTOLIC BLOOD PRESSURE: 80 MMHG | SYSTOLIC BLOOD PRESSURE: 140 MMHG | RESPIRATION RATE: 16 BRPM | TEMPERATURE: 98 F

## 2019-12-03 DIAGNOSIS — Z98.89 OTHER SPECIFIED POSTPROCEDURAL STATES: Chronic | ICD-10-CM

## 2019-12-03 DIAGNOSIS — Z98.890 OTHER SPECIFIED POSTPROCEDURAL STATES: Chronic | ICD-10-CM

## 2019-12-03 DIAGNOSIS — H33.21 SEROUS RETINAL DETACHMENT, RIGHT EYE: Chronic | ICD-10-CM

## 2019-12-03 DIAGNOSIS — Z01.818 ENCOUNTER FOR OTHER PREPROCEDURAL EXAMINATION: ICD-10-CM

## 2019-12-03 DIAGNOSIS — M19.012 PRIMARY OSTEOARTHRITIS, LEFT SHOULDER: ICD-10-CM

## 2019-12-03 PROCEDURE — 29826 SHO ARTHRS SRG DECOMPRESSION: CPT | Mod: AS,LT

## 2019-12-03 PROCEDURE — 29823 SHO ARTHRS SRG XTNSV DBRDMT: CPT | Mod: LT

## 2019-12-03 PROCEDURE — 88304 TISSUE EXAM BY PATHOLOGIST: CPT | Mod: 26

## 2019-12-03 PROCEDURE — 29821 SHO ARTHRS SRG COMPL SYNVCT: CPT | Mod: AS,59,LT

## 2019-12-03 PROCEDURE — 88304 TISSUE EXAM BY PATHOLOGIST: CPT

## 2019-12-03 PROCEDURE — 29826 SHO ARTHRS SRG DECOMPRESSION: CPT | Mod: LT

## 2019-12-03 PROCEDURE — 29823 SHO ARTHRS SRG XTNSV DBRDMT: CPT | Mod: AS,59,LT

## 2019-12-03 RX ORDER — SODIUM CHLORIDE 9 MG/ML
3 INJECTION INTRAMUSCULAR; INTRAVENOUS; SUBCUTANEOUS EVERY 8 HOURS
Refills: 0 | Status: DISCONTINUED | OUTPATIENT
Start: 2019-12-03 | End: 2019-12-03

## 2019-12-03 RX ORDER — SODIUM CHLORIDE 9 MG/ML
1000 INJECTION INTRAMUSCULAR; INTRAVENOUS; SUBCUTANEOUS
Refills: 0 | Status: DISCONTINUED | OUTPATIENT
Start: 2019-12-03 | End: 2019-12-17

## 2019-12-03 RX ORDER — ACETAMINOPHEN 500 MG
650 TABLET ORAL EVERY 6 HOURS
Refills: 0 | Status: DISCONTINUED | OUTPATIENT
Start: 2019-12-03 | End: 2019-12-17

## 2019-12-03 RX ORDER — MORPHINE SULFATE 50 MG/1
4 CAPSULE, EXTENDED RELEASE ORAL
Refills: 0 | Status: DISCONTINUED | OUTPATIENT
Start: 2019-12-03 | End: 2019-12-03

## 2019-12-03 RX ORDER — ACETAMINOPHEN 500 MG
975 TABLET ORAL ONCE
Refills: 0 | Status: COMPLETED | OUTPATIENT
Start: 2019-12-03 | End: 2019-12-03

## 2019-12-03 RX ORDER — SODIUM CHLORIDE 9 MG/ML
1000 INJECTION, SOLUTION INTRAVENOUS
Refills: 0 | Status: DISCONTINUED | OUTPATIENT
Start: 2019-12-03 | End: 2019-12-03

## 2019-12-03 RX ORDER — OXYCODONE AND ACETAMINOPHEN 5; 325 MG/1; MG/1
2 TABLET ORAL EVERY 6 HOURS
Refills: 0 | Status: DISCONTINUED | OUTPATIENT
Start: 2019-12-03 | End: 2019-12-03

## 2019-12-03 RX ORDER — FENTANYL CITRATE 50 UG/ML
25 INJECTION INTRAVENOUS
Refills: 0 | Status: DISCONTINUED | OUTPATIENT
Start: 2019-12-03 | End: 2019-12-03

## 2019-12-03 RX ORDER — ONDANSETRON 8 MG/1
4 TABLET, FILM COATED ORAL ONCE
Refills: 0 | Status: DISCONTINUED | OUTPATIENT
Start: 2019-12-03 | End: 2019-12-03

## 2019-12-03 RX ORDER — CELECOXIB 200 MG/1
200 CAPSULE ORAL ONCE
Refills: 0 | Status: COMPLETED | OUTPATIENT
Start: 2019-12-03 | End: 2019-12-03

## 2019-12-03 RX ORDER — ACETAMINOPHEN 500 MG
1000 TABLET ORAL ONCE
Refills: 0 | Status: DISCONTINUED | OUTPATIENT
Start: 2019-12-03 | End: 2019-12-03

## 2019-12-03 RX ORDER — OXYCODONE AND ACETAMINOPHEN 5; 325 MG/1; MG/1
1 TABLET ORAL EVERY 4 HOURS
Refills: 0 | Status: DISCONTINUED | OUTPATIENT
Start: 2019-12-03 | End: 2019-12-03

## 2019-12-03 RX ADMIN — Medication 975 MILLIGRAM(S): at 07:06

## 2019-12-03 RX ADMIN — SODIUM CHLORIDE 3 MILLILITER(S): 9 INJECTION INTRAMUSCULAR; INTRAVENOUS; SUBCUTANEOUS at 07:06

## 2019-12-03 RX ADMIN — CELECOXIB 200 MILLIGRAM(S): 200 CAPSULE ORAL at 07:06

## 2019-12-03 NOTE — ASU DISCHARGE PLAN (ADULT/PEDIATRIC) - CARE PROVIDER_API CALL
Evan Hernandes)  Orthopaedic Surgery; Sports Medicine  [address] [place] [address], [address] [place] [address]  Phone: [phone]  Fax: [phone]  Follow Up Time:

## 2019-12-03 NOTE — ASU DISCHARGE PLAN (ADULT/PEDIATRIC) - CALL YOUR DOCTOR IF YOU HAVE ANY OF THE FOLLOWING:
Fever greater than (need to indicate Fahrenheit or Celsius)/Bleeding that does not stop/Wound/Surgical Site with redness, or foul smelling discharge or pus/Numbness, tingling, color or temperature change to extremity/Pain not relieved by Medications

## 2019-12-03 NOTE — ASU PATIENT PROFILE, ADULT - PMH
Anxiety    Bursitis    Carpal tunnel syndrome on left    Carpal tunnel syndrome, bilateral    Cataract    Depression    H/O chronic bronchitis    Hiatal hernia    HTN (hypertension)    Hyperlipemia    Insomnia    Migraine aura without headache    Morbidly obese    Ovarian cyst    Right retinal detachment    Rotator cuff tear, left    Sleep apnea    Stress incontinence, female    TIA (transient ischemic attack)  with slurred speach and blurred vision , possible migraines, pt under neurology care

## 2019-12-05 LAB — SURGICAL PATHOLOGY STUDY: SIGNIFICANT CHANGE UP

## 2020-01-27 PROBLEM — Z87.09 PERSONAL HISTORY OF OTHER DISEASES OF THE RESPIRATORY SYSTEM: Chronic | Status: ACTIVE | Noted: 2019-11-27

## 2020-02-04 ENCOUNTER — APPOINTMENT (OUTPATIENT)
Dept: PHYSICAL MEDICINE AND REHAB | Facility: CLINIC | Age: 60
End: 2020-02-04
Payer: MEDICARE

## 2020-02-04 VITALS
HEART RATE: 60 BPM | OXYGEN SATURATION: 99 % | HEIGHT: 66 IN | WEIGHT: 250 LBS | BODY MASS INDEX: 40.18 KG/M2 | RESPIRATION RATE: 16 BRPM

## 2020-02-04 DIAGNOSIS — M70.62 TROCHANTERIC BURSITIS, LEFT HIP: ICD-10-CM

## 2020-02-04 DIAGNOSIS — M54.5 LOW BACK PAIN: ICD-10-CM

## 2020-02-04 PROCEDURE — 99214 OFFICE O/P EST MOD 30 MIN: CPT | Mod: 25

## 2020-02-04 PROCEDURE — 20611 DRAIN/INJ JOINT/BURSA W/US: CPT | Mod: LT

## 2020-02-04 NOTE — HISTORY OF PRESENT ILLNESS
[FreeTextEntry1] : Ms. JOSE IVORY is a very pleasant 57 year female who comes in for follow up evaluation of of the left hip after undergoing an USG GTB injection June 2018 which did give her several months of relief. Since that time she has been diagnosed with CHF and is initiating treatment with a Cardiologist this week. Currently her hip pain has returned to its previous state, located primarily left hip intermittent in nature and described as extreme pain. The pain is rated as 10/10 during today's visit, and ranges from 8/10. The patient's symptoms are aggravated by standing and walking and alleviated by not walking and standing for long periods of time . The patient denies any night pain, numbness/tingling, weakness, or bowel/bladder dysfunction. The patient also complains of new onset low back pain which started approximately 2-3 months ago without any injury/event. Pain is focal to the lower back without any radiating symptoms to the extremities. The patient has no other complaints at this time.

## 2020-02-04 NOTE — PROCEDURE
[de-identified] : Procedure: LEFT Hip Greater Trochanter Bursa Injection with Ultrasound Guidance\par \par Findings: The LEFT greater trochanteric bursa region was examined under ultrasound using a 10mHz linear array transducer. There was evidence of a mild bursal effusion in the trochanteric bursa.\par \par Injectate: 1 mL Kenalog (40mg/mL) and 4 mL 1% Lidocaine\par \par After risks, benefits and alternatives were discussed and the patient expressed understanding, informed consent was obtained. Risks include but are not limited to bleeding, infection, worsening pain, nerve damage, scar formation. \par \par Ultrasound was indicated for needle guidance, to ensure needle placement, and to assess for any effusions or vasculature in the path of the needle.\par \par The LEFT trochanteric bursa region was identified by ultrasound, and probe location was marked on the skin. Ultrasound findings are noted above.  The overlying area was prepped and draped in a sterile fashion with Chloraprep.  After skin preparation, a 25 gauge needle was used to anesthetize the skin with 3 mL of 1% Lidocaine. Then a 3.5" 22 gauge spinal needle was then introduced and advanced under ultrasound guidance, needle tip position was confirmed, aspiration for blood prior to injection was negative.  The injectate solution mentioned above was administered into the trochanteric bursa under direct ultrasound visualization without resistance. Fluid distention was appreciated on ultrasound confirming placement. Ultrasound images were permanently stored as part of the patient’s record. \par \par At the conclusion of the procedure the needle was removed, the area was cleaned with alcohol and a band-aid was placed over the injection site. The patient tolerated the procedure well. There were no complications during or after the procedure. The patient reported improvement in symptoms following the procedure. Post-procedure care instructions and follow up appointment were given. If there are any complications, the patient was instructed to call the office.  \par \par

## 2020-02-04 NOTE — ASSESSMENT
[FreeTextEntry1] : Impression:\par 1. Left Hip GTPS\par \par Plan: The patient responded well to the USG GTB injection at her previous visit. However after not performing any PT symptoms have returned. Further review of the history, physical examination, and imaging, the patient's symptoms are consistent with left greater trochanteric bursitis now with superimposed lumbar facet syndrome. The imaging results and diagnosis were reviewed with the patient. We reviewed the potential treatment options including physical therapy, oral medication, ultrasound guided injections, and surgery; as well as alternative therapeutics such as acupuncture and massage. We also discussed the importance of weight loss, ergonomics, and posture in the lost term management of the condition. At this time I am recommending that the patient undergo a repeat Left USG GTB injection. We had a lengthy discussion regarding weight loss and hip strengthening which is the only way to improve her condition long term and decrease recurrence. I have given her a detailed HEP to follow. We will obtain an MR of the LSpine to further evaluate for potential interventional procedures. The patient expressed verbal understanding and is in agreement with the plan of care. All of the patient's questions and concerns were addressed during today's visit.

## 2020-02-04 NOTE — PHYSICAL EXAM
[FreeTextEntry1] : GEN: AAOx3, NAD.\par STRENGTH: 5/5 bilateral hip flexors, knee extensors, knee flexors, ankle dorsiflexors, long toe extensors, ankle plantar flexors, hip extensors, hip abductors.\par TONE: Normal, No clonus.\par REFLEXES: 2+ symmetric patella, medial hamstring, Achilles. Plantars downgoing bilaterally.\par SENS: Grossly intact to light touch bilateral lower extremities.\par INSP: Spine alignment is midline, with no evidence of scoliosis.\par STANCE: No Trendelenburg with single leg stance.\par GAIT: Non antalgic, normal reciprocating heel to toe.\par LUMBAR ROM: Flexion, extension, side-bending, rotation, oblique extension all full and pain free.  \par HIP ROM: Left hip flexion, internal rotation, and external rotation reproduces left groin pain with limited internal rotation.\par PALP: (+) TTP Left GTB. There is no tenderness over the midline spinous processes, paravertebral muscles, SIJ bilaterally.\par SPECIAL: SLR negative bilaterally. Slump test negative bilaterally. FADIR, FERN positive on the left.

## 2020-02-07 ENCOUNTER — INPATIENT (INPATIENT)
Facility: HOSPITAL | Age: 60
LOS: 4 days | Discharge: HOME | End: 2020-02-12
Attending: INTERNAL MEDICINE | Admitting: INTERNAL MEDICINE
Payer: MEDICARE

## 2020-02-07 VITALS
RESPIRATION RATE: 19 BRPM | TEMPERATURE: 98 F | SYSTOLIC BLOOD PRESSURE: 202 MMHG | OXYGEN SATURATION: 95 % | HEART RATE: 65 BPM | DIASTOLIC BLOOD PRESSURE: 111 MMHG

## 2020-02-07 DIAGNOSIS — Z98.890 OTHER SPECIFIED POSTPROCEDURAL STATES: Chronic | ICD-10-CM

## 2020-02-07 DIAGNOSIS — R06.01 ORTHOPNEA: ICD-10-CM

## 2020-02-07 DIAGNOSIS — Z98.89 OTHER SPECIFIED POSTPROCEDURAL STATES: Chronic | ICD-10-CM

## 2020-02-07 DIAGNOSIS — H33.21 SEROUS RETINAL DETACHMENT, RIGHT EYE: Chronic | ICD-10-CM

## 2020-02-07 DIAGNOSIS — E78.2 MIXED HYPERLIPIDEMIA: ICD-10-CM

## 2020-02-07 DIAGNOSIS — R07.89 OTHER CHEST PAIN: ICD-10-CM

## 2020-02-07 DIAGNOSIS — Z29.9 ENCOUNTER FOR PROPHYLACTIC MEASURES, UNSPECIFIED: ICD-10-CM

## 2020-02-07 DIAGNOSIS — R06.00 DYSPNEA, UNSPECIFIED: ICD-10-CM

## 2020-02-07 DIAGNOSIS — I16.0 HYPERTENSIVE URGENCY: ICD-10-CM

## 2020-02-07 LAB
ALBUMIN SERPL ELPH-MCNC: 4 G/DL — SIGNIFICANT CHANGE UP (ref 3.3–5)
ALP SERPL-CCNC: 111 U/L — SIGNIFICANT CHANGE UP (ref 40–120)
ALT FLD-CCNC: 9 U/L — SIGNIFICANT CHANGE UP (ref 4–33)
ANION GAP SERPL CALC-SCNC: 12 MMO/L — SIGNIFICANT CHANGE UP (ref 7–14)
AST SERPL-CCNC: 10 U/L — SIGNIFICANT CHANGE UP (ref 4–32)
BASOPHILS # BLD AUTO: 0.04 K/UL — SIGNIFICANT CHANGE UP (ref 0–0.2)
BASOPHILS NFR BLD AUTO: 0.4 % — SIGNIFICANT CHANGE UP (ref 0–2)
BILIRUB SERPL-MCNC: 0.2 MG/DL — SIGNIFICANT CHANGE UP (ref 0.2–1.2)
BUN SERPL-MCNC: 14 MG/DL — SIGNIFICANT CHANGE UP (ref 7–23)
CALCIUM SERPL-MCNC: 9.4 MG/DL — SIGNIFICANT CHANGE UP (ref 8.4–10.5)
CHLORIDE SERPL-SCNC: 101 MMOL/L — SIGNIFICANT CHANGE UP (ref 98–107)
CO2 SERPL-SCNC: 25 MMOL/L — SIGNIFICANT CHANGE UP (ref 22–31)
CREAT SERPL-MCNC: 0.85 MG/DL — SIGNIFICANT CHANGE UP (ref 0.5–1.3)
EOSINOPHIL # BLD AUTO: 0.16 K/UL — SIGNIFICANT CHANGE UP (ref 0–0.5)
EOSINOPHIL NFR BLD AUTO: 1.8 % — SIGNIFICANT CHANGE UP (ref 0–6)
GLUCOSE SERPL-MCNC: 105 MG/DL — HIGH (ref 70–99)
HCT VFR BLD CALC: 40.2 % — SIGNIFICANT CHANGE UP (ref 34.5–45)
HGB BLD-MCNC: 12.1 G/DL — SIGNIFICANT CHANGE UP (ref 11.5–15.5)
IMM GRANULOCYTES NFR BLD AUTO: 0.4 % — SIGNIFICANT CHANGE UP (ref 0–1.5)
LYMPHOCYTES # BLD AUTO: 1.47 K/UL — SIGNIFICANT CHANGE UP (ref 1–3.3)
LYMPHOCYTES # BLD AUTO: 16.2 % — SIGNIFICANT CHANGE UP (ref 13–44)
MCHC RBC-ENTMCNC: 25.5 PG — LOW (ref 27–34)
MCHC RBC-ENTMCNC: 30.1 % — LOW (ref 32–36)
MCV RBC AUTO: 84.8 FL — SIGNIFICANT CHANGE UP (ref 80–100)
MONOCYTES # BLD AUTO: 0.65 K/UL — SIGNIFICANT CHANGE UP (ref 0–0.9)
MONOCYTES NFR BLD AUTO: 7.2 % — SIGNIFICANT CHANGE UP (ref 2–14)
NEUTROPHILS # BLD AUTO: 6.69 K/UL — SIGNIFICANT CHANGE UP (ref 1.8–7.4)
NEUTROPHILS NFR BLD AUTO: 74 % — SIGNIFICANT CHANGE UP (ref 43–77)
NRBC # FLD: 0 K/UL — SIGNIFICANT CHANGE UP (ref 0–0)
NT-PROBNP SERPL-SCNC: 411.1 PG/ML — SIGNIFICANT CHANGE UP
NT-PROBNP SERPL-SCNC: 440.5 PG/ML — SIGNIFICANT CHANGE UP
PLATELET # BLD AUTO: 350 K/UL — SIGNIFICANT CHANGE UP (ref 150–400)
PMV BLD: 10.5 FL — SIGNIFICANT CHANGE UP (ref 7–13)
POTASSIUM SERPL-MCNC: 3.7 MMOL/L — SIGNIFICANT CHANGE UP (ref 3.5–5.3)
POTASSIUM SERPL-SCNC: 3.7 MMOL/L — SIGNIFICANT CHANGE UP (ref 3.5–5.3)
PROT SERPL-MCNC: 7.8 G/DL — SIGNIFICANT CHANGE UP (ref 6–8.3)
RBC # BLD: 4.74 M/UL — SIGNIFICANT CHANGE UP (ref 3.8–5.2)
RBC # FLD: 15.7 % — HIGH (ref 10.3–14.5)
SODIUM SERPL-SCNC: 138 MMOL/L — SIGNIFICANT CHANGE UP (ref 135–145)
TROPONIN T, HIGH SENSITIVITY: 11 NG/L — SIGNIFICANT CHANGE UP (ref ?–14)
TROPONIN T, HIGH SENSITIVITY: 9 NG/L — SIGNIFICANT CHANGE UP (ref ?–14)
WBC # BLD: 9.05 K/UL — SIGNIFICANT CHANGE UP (ref 3.8–10.5)
WBC # FLD AUTO: 9.05 K/UL — SIGNIFICANT CHANGE UP (ref 3.8–10.5)

## 2020-02-07 PROCEDURE — 71046 X-RAY EXAM CHEST 2 VIEWS: CPT | Mod: 26

## 2020-02-07 PROCEDURE — 71250 CT THORAX DX C-: CPT | Mod: 26

## 2020-02-07 RX ORDER — ICOSAPENT ETHYL 500 MG/1
2 CAPSULE, LIQUID FILLED ORAL
Qty: 0 | Refills: 0 | DISCHARGE

## 2020-02-07 RX ORDER — FOLIC ACID 0.8 MG
1 TABLET ORAL DAILY
Refills: 0 | Status: DISCONTINUED | OUTPATIENT
Start: 2020-02-07 | End: 2020-02-12

## 2020-02-07 RX ORDER — AZILSARTAN KAMEDOXOMIL AND CHLORTHALIDONE 40; 12.5 MG/1; MG/1
1 TABLET ORAL
Qty: 0 | Refills: 0 | DISCHARGE

## 2020-02-07 RX ORDER — ACETAMINOPHEN 500 MG
1000 TABLET ORAL ONCE
Refills: 0 | Status: COMPLETED | OUTPATIENT
Start: 2020-02-07 | End: 2020-02-07

## 2020-02-07 RX ORDER — ASPIRIN/CALCIUM CARB/MAGNESIUM 324 MG
81 TABLET ORAL DAILY
Refills: 0 | Status: DISCONTINUED | OUTPATIENT
Start: 2020-02-07 | End: 2020-02-12

## 2020-02-07 RX ORDER — IPRATROPIUM/ALBUTEROL SULFATE 18-103MCG
3 AEROSOL WITH ADAPTER (GRAM) INHALATION ONCE
Refills: 0 | Status: COMPLETED | OUTPATIENT
Start: 2020-02-07 | End: 2020-02-07

## 2020-02-07 RX ORDER — GLYCOPYRROLATE AND FORMOTEROL FUMARATE 9; 4.8 UG/1; UG/1
2 AEROSOL, METERED RESPIRATORY (INHALATION)
Refills: 0 | Status: DISCONTINUED | OUTPATIENT
Start: 2020-02-07 | End: 2020-02-12

## 2020-02-07 RX ORDER — TRAZODONE HCL 50 MG
100 TABLET ORAL AT BEDTIME
Refills: 0 | Status: DISCONTINUED | OUTPATIENT
Start: 2020-02-07 | End: 2020-02-12

## 2020-02-07 RX ORDER — NEBIVOLOL HYDROCHLORIDE 5 MG/1
20 TABLET ORAL DAILY
Refills: 0 | Status: DISCONTINUED | OUTPATIENT
Start: 2020-02-07 | End: 2020-02-08

## 2020-02-07 RX ORDER — FUROSEMIDE 40 MG
40 TABLET ORAL DAILY
Refills: 0 | Status: DISCONTINUED | OUTPATIENT
Start: 2020-02-07 | End: 2020-02-12

## 2020-02-07 RX ORDER — TRAZODONE HCL 50 MG
1 TABLET ORAL
Qty: 0 | Refills: 0 | DISCHARGE

## 2020-02-07 RX ORDER — GABAPENTIN 400 MG/1
400 CAPSULE ORAL
Refills: 0 | Status: DISCONTINUED | OUTPATIENT
Start: 2020-02-07 | End: 2020-02-12

## 2020-02-07 RX ORDER — ENOXAPARIN SODIUM 100 MG/ML
40 INJECTION SUBCUTANEOUS DAILY
Refills: 0 | Status: DISCONTINUED | OUTPATIENT
Start: 2020-02-07 | End: 2020-02-12

## 2020-02-07 RX ORDER — TOPIRAMATE 25 MG
1 TABLET ORAL
Qty: 0 | Refills: 0 | DISCHARGE

## 2020-02-07 RX ORDER — LIFITEGRAST 50 MG/ML
1 SOLUTION/ DROPS OPHTHALMIC
Qty: 0 | Refills: 0 | DISCHARGE

## 2020-02-07 RX ORDER — PREGABALIN 225 MG/1
1000 CAPSULE ORAL DAILY
Refills: 0 | Status: DISCONTINUED | OUTPATIENT
Start: 2020-02-07 | End: 2020-02-12

## 2020-02-07 RX ORDER — ROSUVASTATIN CALCIUM 5 MG/1
40 TABLET ORAL AT BEDTIME
Refills: 0 | Status: DISCONTINUED | OUTPATIENT
Start: 2020-02-07 | End: 2020-02-12

## 2020-02-07 RX ORDER — FLUOXETINE HCL 10 MG
40 CAPSULE ORAL DAILY
Refills: 0 | Status: DISCONTINUED | OUTPATIENT
Start: 2020-02-07 | End: 2020-02-12

## 2020-02-07 RX ADMIN — Medication 3 MILLILITER(S): at 21:27

## 2020-02-07 RX ADMIN — PREGABALIN 1000 MICROGRAM(S): 225 CAPSULE ORAL at 18:36

## 2020-02-07 RX ADMIN — NEBIVOLOL HYDROCHLORIDE 20 MILLIGRAM(S): 5 TABLET ORAL at 22:29

## 2020-02-07 RX ADMIN — Medication 1 MILLIGRAM(S): at 18:36

## 2020-02-07 RX ADMIN — GABAPENTIN 400 MILLIGRAM(S): 400 CAPSULE ORAL at 18:24

## 2020-02-07 RX ADMIN — ROSUVASTATIN CALCIUM 40 MILLIGRAM(S): 5 TABLET ORAL at 22:29

## 2020-02-07 RX ADMIN — GLYCOPYRROLATE AND FORMOTEROL FUMARATE 2 PUFF(S): 9; 4.8 AEROSOL, METERED RESPIRATORY (INHALATION) at 22:27

## 2020-02-07 RX ADMIN — Medication 400 MILLIGRAM(S): at 21:27

## 2020-02-07 RX ADMIN — Medication 1000 MILLIGRAM(S): at 21:42

## 2020-02-07 RX ADMIN — Medication 40 MILLIGRAM(S): at 18:36

## 2020-02-07 RX ADMIN — Medication 40 MILLIGRAM(S): at 18:21

## 2020-02-07 NOTE — ED PROVIDER NOTE - OBJECTIVE STATEMENT
60yo F with PMHx obesity, b/l hip osteoarthritis (no prior joint replacement), HTN, and HLD who was sent from her cardiologist for admission to Dr. Nunez for bilateral pleural effusions that were seen on echo in Dr. Eliz Gutierrez office. The pt had been recently seen at [facility] for worsening sob with minimal exertion, three pillow orthopnea and was discharged without treatment. Denies f/c, cough, n/v, abdominal pain, paresthesias, weakness.

## 2020-02-07 NOTE — PATIENT PROFILE ADULT - ARE ANY OF THE ITEMS ON THE CHART
no patient prefer to fill it out with her family when they come in./no patient prefer to fill it out with her family when they come in./yes

## 2020-02-07 NOTE — CHART NOTE - NSCHARTNOTEFT_GEN_A_CORE
RN called me that pt complaining of chest pain.    I went to see patient sitting up in bed in hallway.  Pt says she has midsternal chest pain, non-radiating.  Pt says it feels like someone is squeezing her heart.  She also reports SOB.  Denies palpitations, cough.  Pt says her symptoms are similar to what brought her to the hospital.      Vitals: BP= 138/108, HR= 72, RR= 20, O2Sat= 100%.  GEN:  AAO x 3, NAD  HEART: RRR, +normal S1 and S2 present  LUNGS: poor inspiratory effort, mild bilateral expiratory wheezing    Plan- EKG shows NSR at 68bpm (unchanged from prior EKG).  Will give patient IV Tylenol 1000mg x 1 dose for pain.  Will also give 1 duoneb treatment.  Pt also due for Nebivolol PO.  Will continue to monitor.

## 2020-02-07 NOTE — H&P ADULT - ATTENDING COMMENTS
Patient seen and examined on 02/07/20.  Agree with above.   Admitted with dyspnea  Check TTE  Pulm eval with Dr. Meehan called    Joaquin Stoddard MD

## 2020-02-07 NOTE — H&P ADULT - HISTORY OF PRESENT ILLNESS
58 y/o F w/ PMHx of HTN, HLD, COPD, former smoker (quit 2 yrs ago; 35 pack yrs), presenting with chest pain and dyspnea. Patient states chest pain and dyspnea started 1.5 weeks ago. Chest pain feels like a squeezing sensation around her heart; denies any radiation of pain. States chest pain is constant but is exacerbated with exertion. SOB is also exacerbated by exertion. Can walk about 1/2 block before getting SOB and experiencing worsening chest pain. States she has also been having lower extremity edema which started 2 weeks ago. Now LE edema is more improved but denies taking any diuretics. Is unable to lay flat as gets significantly dyspneic. States she went OSH ([facility]) due to the worsening LE edema about 2 weeks ago. Per patient was only hospitalized 1 day and was told to follow up outpatient with cardiologist. States she went to see her cardiologist (Dr. Gutierrez) yesterday 2/6 at which time he did an echo; patient was advised to go to ED for admission as was found to have pleural effusions on echo. Patient denies fever, chills, significant wt loss/gain, sputum production, palpitations, abdominal pain, N/V/D, dysuria, recent travel, or sick contacts.

## 2020-02-07 NOTE — ED ADULT TRIAGE NOTE - CHIEF COMPLAINT QUOTE
A&OX3 c/o increased sob over past three weeks has known pleural effusion pt reports sleeping on 3 pillows sent for admission to Dr Nunez for echocardiogram, pmh htn, hyperlipidemia, sleep apnea

## 2020-02-07 NOTE — ED ADULT NURSE NOTE - NSIMPLEMENTINTERV_GEN_ALL_ED
Implemented All Fall Risk Interventions:  [place] to call system. Call bell, personal items and telephone within reach. Instruct patient to call for assistance. Room bathroom lighting operational. Non-slip footwear when patient is off stretcher. Physically safe environment: no spills, clutter or unnecessary equipment. Stretcher in lowest position, wheels locked, appropriate side rails in place. Provide visual cue, wrist band, yellow gown, etc. Monitor gait and stability. Monitor for mental status changes and reorient to person, place, and time. Review medications for side effects contributing to fall risk. Reinforce activity limits and safety measures with patient and family.

## 2020-02-07 NOTE — ED ADULT NURSE NOTE - OBJECTIVE STATEMENT
Patient received to TrB c/o SOB that has gotten progressively worse over past 2 weeks. SOB worsens on exertion. Pt states she's also been having lower extremity edema that has gone down in the past 2 days. No edema observed at this time. + pedal pulses palpated B/L. Today is c/o midsternal CP. Pt is was sent by cardiologist for admission due to "I have fluid around my lungs" as stated by patient. Uses CPAP at night. Hx: HTN, Sleep apnea, TIA, migraines. Pt is Aox4, arrived to room ambulating with a cane. Steady gait. Appears SOB when speaking and ambulating. Spo maintains >96% on RA. Sinus braducardia on cardiac monitor with HR 55-58. Pending MD boyle. Will continue to monitor.

## 2020-02-07 NOTE — H&P ADULT - ASSESSMENT
60 y/o F w/ PMHx of HTN, HLD, COPD, former smoker (quit 2 yrs ago; 35 pack yrs), presenting with chest pain and dyspnea. Is unable to lay flat as gets significantly dyspneic. States she went OSH ([facility]) due to the worsening LE edema about 2 weeks ago. States she went to see her cardiologist (Dr. Gutierrez) yesterday 2/6 at which time he did an echo; patient was advised to go to ED for admission as was found to have pleural effusions on echo.       + Chest pain  + Dyspnea

## 2020-02-07 NOTE — H&P ADULT - NSHPLABSRESULTS_GEN_ALL_CORE
12.1   9.05  )-----------( 350      ( 07 Feb 2020 13:59 )             40.2     02-07    138  |  101  |  14  ----------------------------<  105<H>  3.7   |  25  |  0.85    Ca    9.4      07 Feb 2020 13:59    TPro  7.8  /  Alb  4.0  /  TBili  0.2  /  DBili  x   /  AST  10  /  ALT  9   /  AlkPhos  111  02-07    CXR: Clear lungs. No pleural effusions or pneumothorax.    EKG: NSR @ 63BPM w/ LAD; qtc 450    Trops 9-->11    -->411

## 2020-02-07 NOTE — H&P ADULT - PROBLEM SELECTOR PLAN 1
Admit to telemetry  Trops neg 9-->11  CXR neg  Outside echo unremarkable per cardiology team  Check CBC, BMP, TSH, FLP, HgA1C  Continue home medications  F/U MD note

## 2020-02-07 NOTE — H&P ADULT - NSICDXFAMILYHX_GEN_ALL_CORE_FT
FAMILY HISTORY:  Diabetes mellitus, type 2  Family history of brain cancer  Family history of hypertension

## 2020-02-07 NOTE — ED PROVIDER NOTE - CLINICAL SUMMARY MEDICAL DECISION MAKING FREE TEXT BOX
Marie att: 60 y/o F w/ PMHx of HTN, HLD, COPD, former smoker (35 pack yrs), presenting with chest pain, dyspnea, orthopnea, found to have b/l pleural effusions in her cardiologist's office sent for admission.   -ecg, xray chest, labs (cardiac), admission for tele and cards work-up

## 2020-02-08 DIAGNOSIS — G47.33 OBSTRUCTIVE SLEEP APNEA (ADULT) (PEDIATRIC): ICD-10-CM

## 2020-02-08 LAB
ANION GAP SERPL CALC-SCNC: 15 MMO/L — HIGH (ref 7–14)
BASOPHILS # BLD AUTO: 0.04 K/UL — SIGNIFICANT CHANGE UP (ref 0–0.2)
BASOPHILS NFR BLD AUTO: 0.4 % — SIGNIFICANT CHANGE UP (ref 0–2)
BUN SERPL-MCNC: 17 MG/DL — SIGNIFICANT CHANGE UP (ref 7–23)
CALCIUM SERPL-MCNC: 9.3 MG/DL — SIGNIFICANT CHANGE UP (ref 8.4–10.5)
CHLORIDE SERPL-SCNC: 97 MMOL/L — LOW (ref 98–107)
CK MB BLD-MCNC: 1.31 NG/ML — SIGNIFICANT CHANGE UP (ref 1–4.7)
CK MB BLD-MCNC: SIGNIFICANT CHANGE UP (ref 0–2.5)
CK SERPL-CCNC: 72 U/L — SIGNIFICANT CHANGE UP (ref 25–170)
CO2 SERPL-SCNC: 24 MMOL/L — SIGNIFICANT CHANGE UP (ref 22–31)
CREAT SERPL-MCNC: 0.83 MG/DL — SIGNIFICANT CHANGE UP (ref 0.5–1.3)
EOSINOPHIL # BLD AUTO: 0.28 K/UL — SIGNIFICANT CHANGE UP (ref 0–0.5)
EOSINOPHIL NFR BLD AUTO: 2.5 % — SIGNIFICANT CHANGE UP (ref 0–6)
GLUCOSE SERPL-MCNC: 112 MG/DL — HIGH (ref 70–99)
HCT VFR BLD CALC: 39.4 % — SIGNIFICANT CHANGE UP (ref 34.5–45)
HCV AB S/CO SERPL IA: 0.14 S/CO — SIGNIFICANT CHANGE UP (ref 0–0.99)
HCV AB SERPL-IMP: SIGNIFICANT CHANGE UP
HGB BLD-MCNC: 12.1 G/DL — SIGNIFICANT CHANGE UP (ref 11.5–15.5)
IMM GRANULOCYTES NFR BLD AUTO: 0.5 % — SIGNIFICANT CHANGE UP (ref 0–1.5)
LYMPHOCYTES # BLD AUTO: 1.7 K/UL — SIGNIFICANT CHANGE UP (ref 1–3.3)
LYMPHOCYTES # BLD AUTO: 15.1 % — SIGNIFICANT CHANGE UP (ref 13–44)
MAGNESIUM SERPL-MCNC: 2.2 MG/DL — SIGNIFICANT CHANGE UP (ref 1.6–2.6)
MCHC RBC-ENTMCNC: 25.9 PG — LOW (ref 27–34)
MCHC RBC-ENTMCNC: 30.7 % — LOW (ref 32–36)
MCV RBC AUTO: 84.2 FL — SIGNIFICANT CHANGE UP (ref 80–100)
MONOCYTES # BLD AUTO: 0.75 K/UL — SIGNIFICANT CHANGE UP (ref 0–0.9)
MONOCYTES NFR BLD AUTO: 6.6 % — SIGNIFICANT CHANGE UP (ref 2–14)
NEUTROPHILS # BLD AUTO: 8.46 K/UL — HIGH (ref 1.8–7.4)
NEUTROPHILS NFR BLD AUTO: 74.9 % — SIGNIFICANT CHANGE UP (ref 43–77)
NRBC # FLD: 0 K/UL — SIGNIFICANT CHANGE UP (ref 0–0)
PLATELET # BLD AUTO: 377 K/UL — SIGNIFICANT CHANGE UP (ref 150–400)
PMV BLD: 10.8 FL — SIGNIFICANT CHANGE UP (ref 7–13)
POTASSIUM SERPL-MCNC: 3.6 MMOL/L — SIGNIFICANT CHANGE UP (ref 3.5–5.3)
POTASSIUM SERPL-SCNC: 3.6 MMOL/L — SIGNIFICANT CHANGE UP (ref 3.5–5.3)
RBC # BLD: 4.68 M/UL — SIGNIFICANT CHANGE UP (ref 3.8–5.2)
RBC # FLD: 16.1 % — HIGH (ref 10.3–14.5)
SODIUM SERPL-SCNC: 136 MMOL/L — SIGNIFICANT CHANGE UP (ref 135–145)
WBC # BLD: 11.29 K/UL — HIGH (ref 3.8–10.5)
WBC # FLD AUTO: 11.29 K/UL — HIGH (ref 3.8–10.5)

## 2020-02-08 PROCEDURE — 93010 ELECTROCARDIOGRAM REPORT: CPT | Mod: 76

## 2020-02-08 PROCEDURE — ZZZZZ: CPT

## 2020-02-08 RX ORDER — ACETAMINOPHEN 500 MG
1000 TABLET ORAL ONCE
Refills: 0 | Status: COMPLETED | OUTPATIENT
Start: 2020-02-08 | End: 2020-02-08

## 2020-02-08 RX ORDER — AMLODIPINE BESYLATE 2.5 MG/1
10 TABLET ORAL DAILY
Refills: 0 | Status: DISCONTINUED | OUTPATIENT
Start: 2020-02-09 | End: 2020-02-12

## 2020-02-08 RX ORDER — AMLODIPINE BESYLATE 2.5 MG/1
5 TABLET ORAL DAILY
Refills: 0 | Status: DISCONTINUED | OUTPATIENT
Start: 2020-02-08 | End: 2020-02-08

## 2020-02-08 RX ORDER — AMLODIPINE BESYLATE 2.5 MG/1
5 TABLET ORAL ONCE
Refills: 0 | Status: COMPLETED | OUTPATIENT
Start: 2020-02-08 | End: 2020-02-08

## 2020-02-08 RX ORDER — ACETAMINOPHEN 500 MG
650 TABLET ORAL EVERY 6 HOURS
Refills: 0 | Status: DISCONTINUED | OUTPATIENT
Start: 2020-02-08 | End: 2020-02-12

## 2020-02-08 RX ADMIN — Medication 81 MILLIGRAM(S): at 11:50

## 2020-02-08 RX ADMIN — Medication 400 MILLIGRAM(S): at 04:26

## 2020-02-08 RX ADMIN — AMLODIPINE BESYLATE 5 MILLIGRAM(S): 2.5 TABLET ORAL at 11:50

## 2020-02-08 RX ADMIN — GLYCOPYRROLATE AND FORMOTEROL FUMARATE 2 PUFF(S): 9; 4.8 AEROSOL, METERED RESPIRATORY (INHALATION) at 21:56

## 2020-02-08 RX ADMIN — Medication 650 MILLIGRAM(S): at 19:01

## 2020-02-08 RX ADMIN — ROSUVASTATIN CALCIUM 40 MILLIGRAM(S): 5 TABLET ORAL at 21:56

## 2020-02-08 RX ADMIN — PREGABALIN 1000 MICROGRAM(S): 225 CAPSULE ORAL at 11:51

## 2020-02-08 RX ADMIN — Medication 1000 MILLIGRAM(S): at 04:56

## 2020-02-08 RX ADMIN — AMLODIPINE BESYLATE 5 MILLIGRAM(S): 2.5 TABLET ORAL at 17:27

## 2020-02-08 RX ADMIN — Medication 650 MILLIGRAM(S): at 18:03

## 2020-02-08 RX ADMIN — Medication 40 MILLIGRAM(S): at 11:51

## 2020-02-08 RX ADMIN — Medication 1 MILLIGRAM(S): at 11:51

## 2020-02-08 RX ADMIN — GABAPENTIN 400 MILLIGRAM(S): 400 CAPSULE ORAL at 17:28

## 2020-02-08 RX ADMIN — GLYCOPYRROLATE AND FORMOTEROL FUMARATE 2 PUFF(S): 9; 4.8 AEROSOL, METERED RESPIRATORY (INHALATION) at 11:50

## 2020-02-08 RX ADMIN — ENOXAPARIN SODIUM 40 MILLIGRAM(S): 100 INJECTION SUBCUTANEOUS at 11:52

## 2020-02-08 RX ADMIN — GABAPENTIN 400 MILLIGRAM(S): 400 CAPSULE ORAL at 05:11

## 2020-02-08 NOTE — CONSULT NOTE ADULT - PROBLEM SELECTOR RECOMMENDATION 2
she has copd: but I don't think she is having copd exacerbation: her ct chest to me is clear: official report is pending: she has no pleural effusions too: follow official report: cont bd

## 2020-02-08 NOTE — DIETITIAN INITIAL EVALUATION ADULT. - ADD RECOMMEND
1. Continue with current diet order. 2. Monitor weights, labs, BM's, skin integrity, p.o. intake. 3. RD remains available, re-consult as needed.

## 2020-02-08 NOTE — DIETITIAN INITIAL EVALUATION ADULT. - OTHER INFO
60 yo F with PMHx of HTN, HLD, COPD, TIA, Chonic bronchitis, Depression, sleep apnea, anxiety, former smoker (quit 2 yrs ago; 35 pack yrs), presenting with chest pain and dyspnea. Is unable to lay flat as gets significantly dyspneic. States she went OSH ([facility]) due to the worsening LE edema about 2 weeks ago. States she went to see her cardiologist (Dr. Gutierrez) yesterday 2/6 at which time he did an echo; patient was advised to go to ED for admission as was found to have pleural effusions on echo. Patient reported decreased appetite/ PO intake this past week, at this time consuming 50-75% of meals. Denies recent weight loss and endorses  lbs. Current weight 259.3 lbs. Patient denies any nausea/vomiting/diarrhea/constipation or difficulty chewing and swallowing. Last BM 2/7/20. NKFA or intolerances reported. Patient reports eating a lot of ice cream, chips, eating out frequently and not following heart healthy diet at home, does read labels and tries to limit salt intake, and does not add salt to foods. Patient agreeable to therapeutic diet education at this time. Educated her on Heart healthy diet principles, discussed 1500 mg sodium restriction, nutrition label reading, no added salt to foods, limiting eating out, Types of fats, No Caffeine, 1500 mL fluid restriction.

## 2020-02-08 NOTE — PROVIDER CONTACT NOTE (OTHER) - BACKGROUND
Pt admitted with dyspnea, chest pain, and HTN urgency. orthopnea. PMH bursitis, insomnia, HLD, obesity, anxiety, HTN.

## 2020-02-08 NOTE — PROGRESS NOTE ADULT - ATTENDING COMMENTS
CARDIOLOGY ATTENDING    Agree with above. CXR, BNP and CT chest without pulmonary edema. Would hold lasix, get echo, and get pulmonary consult. CARDIOLOGY ATTENDING    Patient seen and examined. Agree with above. CXR, BNP and CT chest without pulmonary edema. Would hold lasix, get echo, and get pulmonary consult.

## 2020-02-08 NOTE — CONSULT NOTE ADULT - ASSESSMENT
58 y/o F w/ PMHx of HTN, HLD, COPD, former smoker (quit 2 yrs ago; 35 pack yrs), presenting with chest pain and dyspnea. Is unable to lay flat as gets significantly dyspneic. States she went OSH ([facility]) due to the worsening LE edema about 2 weeks ago. States she went to see her cardiologist (Dr. Gutierrez) yesterday 2/6 at which time he did an echo; patient was advised to go to ED for admission as was found to have pleural effusions on echo.       + Chest pain  + Dyspnea

## 2020-02-08 NOTE — PROVIDER CONTACT NOTE (OTHER) - ASSESSMENT
Pt with no acute s/s of distress. Pt reports chest pain 6/10 unchanged from chest pain Pt experienced from admission. VS as documented.

## 2020-02-08 NOTE — CHART NOTE - NSCHARTNOTEFT_GEN_A_CORE
Notified by RN pt HR 40-50s. Pt seen at bedside, appears comfortable resting in bed, in no distress. She denies CP, dizziness, SOB, or any other sx at this time. She denies known hx of arrythmia or slow HR. EKG showed sinus bradycardia, no acute changes. Vitals as follows: Temp 98, /89, RR 16, SpO2 97 on RA. Will continue to monitor. Notified by RN pt HR 40-50s. Pt seen at bedside, appears comfortable resting in bed, in no distress. She denies CP, dizziness, SOB, or any other sx at this time. She denies known hx of arrythmia or slow HR. EKG showed sinus bradycardia, no acute changes. Vitals as follows: Temp 98, /89, RR 16, SpO2 97 on RA. Pt educated to notify RN if experiencing   CP, dizziness, or any other sx. Will continue to monitor.

## 2020-02-08 NOTE — CONSULT NOTE ADULT - SUBJECTIVE AND OBJECTIVE BOX
Patient is a 59y old  Female who presents with a chief complaint of SOB (07 Feb 2020 17:07)      HPI:  58 y/o F w/ PMHx of HTN, HLD, COPD, former smoker (quit 2 yrs ago; 35 pack yrs), presenting with chest pain and dyspnea. Patient states chest pain and dyspnea started 1.5 weeks ago. Chest pain feels like a squeezing sensation around her heart; denies any radiation of pain. States chest pain is constant but is exacerbated with exertion. SOB is also exacerbated by exertion. Can walk about 1/2 block before getting SOB and experiencing worsening chest pain. States she has also been having lower extremity edema which started 2 weeks ago. Now LE edema is more improved but denies taking any diuretics. Is unable to lay flat as gets significantly dyspneic. States she went OSH ([facility]) due to the worsening LE edema about 2 weeks ago. Per patient was only hospitalized 1 day and was told to follow up outpatient with cardiologist. States she went to see her cardiologist (Dr. Gutierrez) yesterday 2/6 at which time he did an echo; patient was advised to go to ED for admission as was found to have pleural effusions on echo. Patient denies fever, chills, significant wt loss/gain, sputum production, palpitations, abdominal pain, N/V/D, dysuria, recent travel, or sick contacts. (07 Feb 2020 17:07)     she says she was using bevespi at home and has ALKA AND USES CPAP every night  ?FOLLOWING PRESENT  [x ] Hx of PE/DVT, [y ] Hx COPD, [ x] Hx of Asthma, [ y] Hx of Hospitalization, [ y]  Hx of BiPAP/CPAP use, [ y Hx of ALKA    Allergies    No Known Allergies    Intolerances        PAST MEDICAL & SURGICAL HISTORY:  H/O chronic bronchitis  Bursitis  Insomnia  Stress incontinence, female  Cataract  Right retinal detachment  Carpal tunnel syndrome on left  Ovarian cyst  Rotator cuff tear, left  Migraine aura without headache  Carpal tunnel syndrome, bilateral  TIA (transient ischemic attack): with slurred speach and blurred vision , possible migraines, pt under neurology care  Depression  Sleep apnea  Hiatal hernia  Anxiety  Hyperlipemia  Morbidly obese  HTN (hypertension)  History of carpal tunnel surgery of left wrist: 2/2017  RD (retinal detachment), right  Status post rotator cuff repair: Left rotator 7/2016  H/O nasal septoplasty: 2014  S/P hernia repair: incarcerated hernia with mesh- May 2015  History of total knee replacement: 2011  History of shoulder surgery: left shoulder arthroscopy- 2010  History of hysterectomy: 1994      FAMILY HISTORY:  Family history of hypertension  Diabetes mellitus, type 2  Family history of brain cancer      Social History: [40 pk years ] TOBACCO                  [  x] ETOH                                 [x ] IVDA/DRUGS    REVIEW OF SYSTEMS      General:	x    Skin/Breast:x  	  Ophthalmologic:x  	  ENMT:	x    Respiratory and Thorax: sob, del cid, cough   	  Cardiovascular:	x    Gastrointestinal:	x    Genitourinary:	x    Musculoskeletal:	x    Neurological:	x    Psychiatric:	x    Hematology/Lymphatics:	x    Endocrine:	x    Allergic/Immunologic:	  x  MEDICATIONS  (STANDING):  amLODIPine   Tablet 5 milliGRAM(s) Oral daily  aspirin enteric coated 81 milliGRAM(s) Oral daily  cyanocobalamin 1000 MICROGram(s) Oral daily  enoxaparin Injectable 40 milliGRAM(s) SubCutaneous daily  FLUoxetine 40 milliGRAM(s) Oral daily  folic acid 1 milliGRAM(s) Oral daily  furosemide   Injectable 40 milliGRAM(s) IV Push daily  gabapentin 400 milliGRAM(s) Oral two times a day  glycopyrrolate 9 MICROgram(s)/formoterol 4.8 MICROgram(s) Inhaler 2 Puff(s) Inhalation two times a day  rosuvastatin 40 milliGRAM(s) Oral at bedtime    MEDICATIONS  (PRN):  traZODone 100 milliGRAM(s) Oral at bedtime PRN insomnia       Vital Signs Last 24 Hrs  T(C): 36.5 (08 Feb 2020 11:48), Max: 36.9 (07 Feb 2020 23:54)  T(F): 97.7 (08 Feb 2020 11:48), Max: 98.5 (08 Feb 2020 03:52)  HR: 49 (08 Feb 2020 11:48) (49 - 83)  BP: 158/78 (08 Feb 2020 11:48) (138/108 - 205/82)  BP(mean): --  RR: 17 (08 Feb 2020 11:48) (16 - 20)  SpO2: 100% (08 Feb 2020 11:48) (96% - 100%)        I&O's Summary    07 Feb 2020 07:01  -  08 Feb 2020 07:00  --------------------------------------------------------  IN: 200 mL / OUT: 300 mL / NET: -100 mL    08 Feb 2020 07:01  -  08 Feb 2020 12:14  --------------------------------------------------------  IN: 200 mL / OUT: 0 mL / NET: 200 mL        Physical Exam:   GENERAL: Obese+  HEENT: TANISHA/   Atraumatic, Normocephalic  ENMT: No tonsillar erythema, exudates, or enlargement; Moist mucous membranes, Good dentition, No lesions  NECK: Supple, No JVD, Normal thyroid  CHEST/LUNG: Clear to auscultation bilaterally: decreased air e ntry bilaterally   CVS: Regular rate and rhythm; No murmurs, rubs, or gallops  GI: : Soft, Nontender, Nondistended; Bowel sounds present  NERVOUS SYSTEM:  Alert & Oriented X33  EXTREMITIES: Mild  edema  LYMPH: No lymphadenopathy noted  SKIN: No rashes or lesions  ENDOCRINOLOGY: No Thyromegaly  PSYCH: Appropriate    Labs:                              12.1   11.29 )-----------( 377      ( 08 Feb 2020 07:00 )             39.4                         12.1   9.05  )-----------( 350      ( 07 Feb 2020 13:59 )             40.2     02-08    136  |  97<L>  |  17  ----------------------------<  112<H>  3.6   |  24  |  0.83  02-07    138  |  101  |  14  ----------------------------<  105<H>  3.7   |  25  |  0.85    Ca    9.3      08 Feb 2020 07:00  Ca    9.4      07 Feb 2020 13:59  Mg     2.2     02-08    TPro  7.8  /  Alb  4.0  /  TBili  0.2  /  DBili  x   /  AST  10  /  ALT  9   /  AlkPhos  111  02-07    CAPILLARY BLOOD GLUCOSE        LIVER FUNCTIONS - ( 07 Feb 2020 13:59 )  Alb: 4.0 g/dL / Pro: 7.8 g/dL / ALK PHOS: 111 u/L / ALT: 9 u/L / AST: 10 u/L / GGT: x               D DImer  Serum Pro-Brain Natriuretic Peptide: 411.1 pg/mL (02-07 @ 15:27)  Serum Pro-Brain Natriuretic Peptide: 440.5 pg/mL (02-07 @ 13:59)      Studies  Chest X-RAY  CT SCAN Chest   CT Abdomen  Venous Dopplers: LE:   Others  < from: CT Chest No Cont (02.07.20 @ 20:20) >  ******PRELIMINARY REPORT******    ******PRELIMINARY REPORT******            EXAM:  CT CHEST        PROCEDURE DATE:  Feb 7 2020     ******PRELIMINARY REPORT******    ******PRELIMINARY REPORT******            INTERPRETATION:  no emergent findings. f/u official report.        < from: Xray Chest 2 Views PA/Lat (02.07.20 @ 14:47) >  EXAM:  XR CHEST PA LAT 2V        PROCEDURE DATE:  Feb 7 2020         INTERPRETATION:  CLINICAL INDICATION: chest pain    EXAM:  Frontal and lateral chest from 2/7/2020 at 1447. Compared to prior study from 7/28/2017.    IMPRESSION:  Clear lungs. No pleural effusions or pneumothorax.    Cardiac and mediastinal silhouettes within normal limits.    Trachea midline.    Spinal degenerative change again noted. Unremarkable remaining visualized osseous structures.                    SOO DÍAZ M.D., ATTENDING RADIOLOGIST  This document has been electronically signed. Feb 7 2020  3:01PM        < end of copied text >      ******PRELIMINARY REPORT******    ******PRELIMINARY REPORT******          KHADRA FISHER M.D., RADIOLOGY RESIDENT    < end of copied text >

## 2020-02-08 NOTE — CHART NOTE - NSCHARTNOTEFT_GEN_A_CORE
Received telephone call from primary team about patient: Sandra Palma, MRN#[id number], RM #[id number], regarding a medication related question. Discussed with ALEX Austin over the telephone, they did not want a formal consult at this time. Primary team to call later if consult is needed #[phone]

## 2020-02-08 NOTE — PROGRESS NOTE ADULT - SUBJECTIVE AND OBJECTIVE BOX
Patient denies cp, sob, endorses intermittent dizziness and lightheadedness.   Review of systems otherwise (-)  	    MEDICATIONS:  MEDICATIONS  (STANDING):  amLODIPine   Tablet 5 milliGRAM(s) Oral daily  aspirin enteric coated 81 milliGRAM(s) Oral daily  cyanocobalamin 1000 MICROGram(s) Oral daily  enoxaparin Injectable 40 milliGRAM(s) SubCutaneous daily  FLUoxetine 40 milliGRAM(s) Oral daily  folic acid 1 milliGRAM(s) Oral daily  furosemide   Injectable 40 milliGRAM(s) IV Push daily  gabapentin 400 milliGRAM(s) Oral two times a day  glycopyrrolate 9 MICROgram(s)/formoterol 4.8 MICROgram(s) Inhaler 2 Puff(s) Inhalation two times a day  rosuvastatin 40 milliGRAM(s) Oral at bedtime      LABS:	 	    CARDIAC MARKERS:                       12.1   11.29 )-----------( 377      ( 08 Feb 2020 07:00 )             39.4   Hemoglobin: 12.1 g/dL (02-08 @ 07:00)  Hemoglobin: 12.1 g/dL (02-07 @ 13:59)      02-08    136  |  97<L>  |  17  ----------------------------<  112<H>  3.6   |  24  |  0.83    Ca    9.3      08 Feb 2020 07:00  Mg     2.2     02-08    TPro  7.8  /  Alb  4.0  /  TBili  0.2  /  DBili  x   /  AST  10  /  ALT  9   /  AlkPhos  111  02-07    Creatinine Trend: 0.83<--, 0.85<--    COAGS:       proBNP: Serum Pro-Brain Natriuretic Peptide: 411.1 pg/mL (02-07 @ 15:27)    Lipid Profile:   HgA1c:   TSH:       PHYSICAL EXAM:  T(C): 36.5 (02-08-20 @ 11:48), Max: 36.9 (02-07-20 @ 23:54)  HR: 49 (02-08-20 @ 11:48) (49 - 83)  BP: 158/78 (02-08-20 @ 11:48) (138/108 - 205/82)  RR: 17 (02-08-20 @ 11:48) (16 - 20)  SpO2: 100% (02-08-20 @ 11:48) (96% - 100%)  Wt(kg): --  I&O's Summary    07 Feb 2020 07:01  -  08 Feb 2020 07:00  --------------------------------------------------------  IN: 200 mL / OUT: 300 mL / NET: -100 mL    08 Feb 2020 07:01  -  08 Feb 2020 15:26  --------------------------------------------------------  IN: 550 mL / OUT: 900 mL / NET: -350 mL      Height (cm): 167.6 (02-07 @ 17:03)  Weight (kg): 117.934 (02-07 @ 17:03)  BMI (kg/m2): 42 (02-07 @ 17:03)  BSA (m2): 2.24 (02-07 @ 17:03)    Gen: Appears well in NAD  HEENT:  (-)icterus (-)pallor  CV: N S1 S2 1/6 ELHAM (+)2 Pulses B/l  Resp:  Clear to ausculatation B/L, normal effort  GI: (+) BS Soft, NT, ND  Lymph:  (-)Edema, (-)obvious lymphadenopathy  Skin: Warm to touch, Normal turgor  Psych: Appropriate mood and affect      TELEMETRY: 	  SB     ASSESSMENT/PLAN: 	    60 y/o F w/ PMHx of HTN, HLD, COPD, former smoker (quit 2 yrs ago; 35 pack yrs), presenting with chest pain and dyspnea. Is unable to lay flat as gets significantly dyspneic. States she went OSH ([facility]) due to the worsening LE edema about 2 weeks ago. States she went to see her cardiologist (Dr. Gutierrez) yesterday 2/6 at which time he did an echo; patient was advised to go to ED for admission as was found to have pleural effusions on echo.     -- pt without cp or sob  -- trop neg x2, will add cpk  -- check echo   -- cont tele, this am with SB to 40's, some dizziness  -- check orthostatics  -- further reccs pending above

## 2020-02-08 NOTE — DIETITIAN INITIAL EVALUATION ADULT. - PERTINENT MEDS FT
MEDICATIONS  (STANDING):  amLODIPine   Tablet 5 milliGRAM(s) Oral daily  aspirin enteric coated 81 milliGRAM(s) Oral daily  cyanocobalamin 1000 MICROGram(s) Oral daily  enoxaparin Injectable 40 milliGRAM(s) SubCutaneous daily  FLUoxetine 40 milliGRAM(s) Oral daily  folic acid 1 milliGRAM(s) Oral daily  furosemide   Injectable 40 milliGRAM(s) IV Push daily  gabapentin 400 milliGRAM(s) Oral two times a day  glycopyrrolate 9 MICROgram(s)/formoterol 4.8 MICROgram(s) Inhaler 2 Puff(s) Inhalation two times a day  rosuvastatin 40 milliGRAM(s) Oral at bedtime    MEDICATIONS  (PRN):  traZODone 100 milliGRAM(s) Oral at bedtime PRN insomnia

## 2020-02-09 LAB
ANION GAP SERPL CALC-SCNC: 14 MMO/L — SIGNIFICANT CHANGE UP (ref 7–14)
BASOPHILS # BLD AUTO: 0.04 K/UL — SIGNIFICANT CHANGE UP (ref 0–0.2)
BASOPHILS NFR BLD AUTO: 0.4 % — SIGNIFICANT CHANGE UP (ref 0–2)
BUN SERPL-MCNC: 16 MG/DL — SIGNIFICANT CHANGE UP (ref 7–23)
CALCIUM SERPL-MCNC: 9.4 MG/DL — SIGNIFICANT CHANGE UP (ref 8.4–10.5)
CHLORIDE SERPL-SCNC: 99 MMOL/L — SIGNIFICANT CHANGE UP (ref 98–107)
CO2 SERPL-SCNC: 23 MMOL/L — SIGNIFICANT CHANGE UP (ref 22–31)
CREAT SERPL-MCNC: 0.86 MG/DL — SIGNIFICANT CHANGE UP (ref 0.5–1.3)
EOSINOPHIL # BLD AUTO: 0.44 K/UL — SIGNIFICANT CHANGE UP (ref 0–0.5)
EOSINOPHIL NFR BLD AUTO: 4.6 % — SIGNIFICANT CHANGE UP (ref 0–6)
GLUCOSE SERPL-MCNC: 120 MG/DL — HIGH (ref 70–99)
HCT VFR BLD CALC: 41 % — SIGNIFICANT CHANGE UP (ref 34.5–45)
HGB BLD-MCNC: 12.6 G/DL — SIGNIFICANT CHANGE UP (ref 11.5–15.5)
IMM GRANULOCYTES NFR BLD AUTO: 0.5 % — SIGNIFICANT CHANGE UP (ref 0–1.5)
LYMPHOCYTES # BLD AUTO: 1.99 K/UL — SIGNIFICANT CHANGE UP (ref 1–3.3)
LYMPHOCYTES # BLD AUTO: 20.9 % — SIGNIFICANT CHANGE UP (ref 13–44)
MAGNESIUM SERPL-MCNC: 2.1 MG/DL — SIGNIFICANT CHANGE UP (ref 1.6–2.6)
MCHC RBC-ENTMCNC: 25.9 PG — LOW (ref 27–34)
MCHC RBC-ENTMCNC: 30.7 % — LOW (ref 32–36)
MCV RBC AUTO: 84.2 FL — SIGNIFICANT CHANGE UP (ref 80–100)
MONOCYTES # BLD AUTO: 0.73 K/UL — SIGNIFICANT CHANGE UP (ref 0–0.9)
MONOCYTES NFR BLD AUTO: 7.7 % — SIGNIFICANT CHANGE UP (ref 2–14)
NEUTROPHILS # BLD AUTO: 6.27 K/UL — SIGNIFICANT CHANGE UP (ref 1.8–7.4)
NEUTROPHILS NFR BLD AUTO: 65.9 % — SIGNIFICANT CHANGE UP (ref 43–77)
NRBC # FLD: 0 K/UL — SIGNIFICANT CHANGE UP (ref 0–0)
PLATELET # BLD AUTO: 369 K/UL — SIGNIFICANT CHANGE UP (ref 150–400)
PMV BLD: 10.7 FL — SIGNIFICANT CHANGE UP (ref 7–13)
POTASSIUM SERPL-MCNC: 3.9 MMOL/L — SIGNIFICANT CHANGE UP (ref 3.5–5.3)
POTASSIUM SERPL-SCNC: 3.9 MMOL/L — SIGNIFICANT CHANGE UP (ref 3.5–5.3)
RBC # BLD: 4.87 M/UL — SIGNIFICANT CHANGE UP (ref 3.8–5.2)
RBC # FLD: 15.9 % — HIGH (ref 10.3–14.5)
SODIUM SERPL-SCNC: 136 MMOL/L — SIGNIFICANT CHANGE UP (ref 135–145)
WBC # BLD: 9.52 K/UL — SIGNIFICANT CHANGE UP (ref 3.8–10.5)
WBC # FLD AUTO: 9.52 K/UL — SIGNIFICANT CHANGE UP (ref 3.8–10.5)

## 2020-02-09 RX ADMIN — GLYCOPYRROLATE AND FORMOTEROL FUMARATE 2 PUFF(S): 9; 4.8 AEROSOL, METERED RESPIRATORY (INHALATION) at 20:32

## 2020-02-09 RX ADMIN — PREGABALIN 1000 MICROGRAM(S): 225 CAPSULE ORAL at 12:55

## 2020-02-09 RX ADMIN — GLYCOPYRROLATE AND FORMOTEROL FUMARATE 2 PUFF(S): 9; 4.8 AEROSOL, METERED RESPIRATORY (INHALATION) at 09:20

## 2020-02-09 RX ADMIN — ENOXAPARIN SODIUM 40 MILLIGRAM(S): 100 INJECTION SUBCUTANEOUS at 12:55

## 2020-02-09 RX ADMIN — AMLODIPINE BESYLATE 10 MILLIGRAM(S): 2.5 TABLET ORAL at 05:47

## 2020-02-09 RX ADMIN — Medication 40 MILLIGRAM(S): at 12:56

## 2020-02-09 RX ADMIN — ROSUVASTATIN CALCIUM 40 MILLIGRAM(S): 5 TABLET ORAL at 21:22

## 2020-02-09 RX ADMIN — Medication 40 MILLIGRAM(S): at 05:46

## 2020-02-09 RX ADMIN — Medication 81 MILLIGRAM(S): at 12:58

## 2020-02-09 RX ADMIN — Medication 1 MILLIGRAM(S): at 12:56

## 2020-02-09 RX ADMIN — GABAPENTIN 400 MILLIGRAM(S): 400 CAPSULE ORAL at 18:13

## 2020-02-09 RX ADMIN — GABAPENTIN 400 MILLIGRAM(S): 400 CAPSULE ORAL at 05:47

## 2020-02-09 NOTE — CONSULT NOTE ADULT - SUBJECTIVE AND OBJECTIVE BOX
Patient is a 59y old  Female who presents with a chief complaint of SOB       HPI:  58 y/o F w/ PMHx of HTN, HLD, COPD, former smoker (quit 2 yrs ago; 35 pack yrs), presenting with chest pain and dyspnea. Patient states chest pain and dyspnea started 1.5 weeks ago. Chest pain feels like a squeezing sensation around her heart; denies any radiation of pain. States chest pain is constant but is exacerbated with exertion. SOB is also exacerbated by exertion. Can walk about 1/2 block before getting SOB and experiencing worsening chest pain. States she has also been having lower extremity edema which started 2 weeks ago. Now LE edema is more improved but denies taking any diuretics. Is unable to lay flat as gets significantly dyspneic. States she went OSH ([facility]) due to the worsening LE edema about 2 weeks ago. Per patient was only hospitalized 1 day and was told to follow up outpatient with cardiologist. States she went to see her cardiologist (Dr. Gutierrez) yesterday 2/6 at which time he did an echo; patient was advised to go to ED for admission as was found to have pleural effusions on echo. Patient denies fever, chills, significant wt loss/gain, sputum production, palpitations, abdominal pain, N/V/D, dysuria, recent travel, or sick contacts.       PAST MEDICAL & SURGICAL HISTORY:  H/O chronic bronchitis  Bursitis  Insomnia  Stress incontinence, female  Cataract  Right retinal detachment  Carpal tunnel syndrome on left  Ovarian cyst  Rotator cuff tear, left  Migraine aura without headache  Carpal tunnel syndrome, bilateral  TIA (transient ischemic attack): with slurred speach and blurred vision , possible migraines, pt under neurology care  Depression  Sleep apnea  Hiatal hernia  Anxiety  Hyperlipemia  Morbidly obese  HTN (hypertension)  History of carpal tunnel surgery of left wrist: 2/2017  RD (retinal detachment), right  Status post rotator cuff repair: Left rotator 7/2016  H/O nasal septoplasty: 2014  S/P hernia repair: incarcerated hernia with mesh- May 2015  History of total knee replacement: 2011  History of shoulder surgery: left shoulder arthroscopy- 2010  History of hysterectomy: 1994      Social History: No smoking ,alcohol or drugs     FAMILY HISTORY:  Family history of hypertension  Diabetes mellitus, type 2  Family history of brain cancer      Allergies    No Known Allergies    Intolerances        REVIEW OF SYSTEMS:    CONSTITUTIONAL: No fever, weight loss, or fatigue  EYES: No eye pain, visual disturbances, or discharge  RESPIRATORY: No cough, wheezing, chills or hemoptysis; but  shortness of breath  CARDIOVASCULAR: No chest pain, palpitations, dizziness, or leg swelling  GASTROINTESTINAL: No abdominal or epigastric pain. No nausea, vomiting, or hematemesis; No diarrhea or constipation. No melena or hematochezia.  GENITOURINARY: No dysuria, frequency, hematuria, or incontinence  NEUROLOGICAL: No headaches, memory loss, loss of strength, numbness, or tremors  SKIN: No itching, burning, rashes, or lesions   ENDOCRINE: No heat or cold intolerance; No hair loss  MUSCULOSKELETAL: No joint pain or swelling; No muscle, back, or extremity pain  PSYCHIATRIC: No depression, anxiety, mood swings, or difficulty sleeping      MEDICATIONS  (STANDING):  amLODIPine   Tablet 10 milliGRAM(s) Oral daily  aspirin enteric coated 81 milliGRAM(s) Oral daily  cyanocobalamin 1000 MICROGram(s) Oral daily  enoxaparin Injectable 40 milliGRAM(s) SubCutaneous daily  FLUoxetine 40 milliGRAM(s) Oral daily  folic acid 1 milliGRAM(s) Oral daily  furosemide   Injectable 40 milliGRAM(s) IV Push daily  gabapentin 400 milliGRAM(s) Oral two times a day  glycopyrrolate 9 MICROgram(s)/formoterol 4.8 MICROgram(s) Inhaler 2 Puff(s) Inhalation two times a day  rosuvastatin 40 milliGRAM(s) Oral at bedtime    MEDICATIONS  (PRN):  acetaminophen   Tablet .. 650 milliGRAM(s) Oral every 6 hours PRN Temp greater or equal to 38C (100.4F), Mild Pain (1 - 3), Moderate Pain (4 - 6), Severe Pain (7 - 10)  traZODone 100 milliGRAM(s) Oral at bedtime PRN insomnia      Vital Signs Last 24 Hrs  T(C): 37.1 (09 Feb 2020 12:44), Max: 37.1 (09 Feb 2020 12:44)  T(F): 98.8 (09 Feb 2020 12:44), Max: 98.8 (09 Feb 2020 12:44)  HR: 60 (09 Feb 2020 12:44) (44 - 71)  BP: 158/78 (09 Feb 2020 12:44) (152/90 - 190/103)  BP(mean): --  RR: 17 (09 Feb 2020 12:44) (15 - 17)  SpO2: 100% (09 Feb 2020 12:44) (98% - 100%)    PHYSICAL EXAM:    GENERAL: NAD, well-groomed, well-developed  HEAD:  Atraumatic, Normocephalic  EYES: EOMI, PERRLA, conjunctiva and sclera clear  ENMT: No tonsillar erythema, exudates, or enlargement; Moist mucous membranes, Good dentition, No lesions  NECK: Supple, No JVD, Normal thyroid  NERVOUS SYSTEM:  Alert & Oriented X3, No focal sign   CHEST/LUNG: Clear  bilaterally; No rales, rhonchi, wheezing, or rubs  HEART: Regular rate and rhythm; No murmurs, rubs, or gallops  ABDOMEN: Soft, Nontender, Nondistended; Bowel sounds present  EXTREMITIES:  2+ Peripheral Pulses, No clubbing, cyanosis, or edema    LABS:                        12.6   9.52  )-----------( 369      ( 09 Feb 2020 06:00 )             41.0     02-09    136  |  99  |  16  ----------------------------<  120<H>  3.9   |  23  |  0.86    Ca    9.4      09 Feb 2020 06:00  Mg     2.1     02-09              RADIOLOGY & ADDITIONAL STUDIES:

## 2020-02-09 NOTE — PROGRESS NOTE ADULT - SUBJECTIVE AND OBJECTIVE BOX
Patient is a 59y old  Female who presents with a chief complaint of SOB (09 Feb 2020 10:23)    Any change in ROS: feels better than yesterday. Used cPAP last night. SOB + but alleviated, Chest pain +but alleviated     MEDICATIONS  (STANDING):  amLODIPine   Tablet 10 milliGRAM(s) Oral daily  aspirin enteric coated 81 milliGRAM(s) Oral daily  cyanocobalamin 1000 MICROGram(s) Oral daily  enoxaparin Injectable 40 milliGRAM(s) SubCutaneous daily  FLUoxetine 40 milliGRAM(s) Oral daily  folic acid 1 milliGRAM(s) Oral daily  furosemide   Injectable 40 milliGRAM(s) IV Push daily  gabapentin 400 milliGRAM(s) Oral two times a day  glycopyrrolate 9 MICROgram(s)/formoterol 4.8 MICROgram(s) Inhaler 2 Puff(s) Inhalation two times a day  rosuvastatin 40 milliGRAM(s) Oral at bedtime    MEDICATIONS  (PRN):  acetaminophen   Tablet .. 650 milliGRAM(s) Oral every 6 hours PRN Temp greater or equal to 38C (100.4F), Mild Pain (1 - 3), Moderate Pain (4 - 6), Severe Pain (7 - 10)  traZODone 100 milliGRAM(s) Oral at bedtime PRN insomnia    Vital Signs Last 24 Hrs  T(C): 36.8 (09 Feb 2020 09:20), Max: 36.8 (08 Feb 2020 21:53)  T(F): 98.2 (09 Feb 2020 09:20), Max: 98.3 (09 Feb 2020 01:50)  HR: 60 (09 Feb 2020 09:20) (44 - 71)  BP: 153/70 (09 Feb 2020 09:20) (152/90 - 190/103)  BP(mean): --  RR: 16 (09 Feb 2020 09:20) (15 - 17)  SpO2: 100% (09 Feb 2020 09:20) (98% - 100%)    I&O's Summary    08 Feb 2020 07:01  -  09 Feb 2020 07:00  --------------------------------------------------------  IN: 1550 mL / OUT: 2050 mL / NET: -500 mL          Physical Exam:   GENERAL: The patient comfortable with no apparent distress. obese  HEENT: Head is normocephalic and atraumatic.    NECK: Supple with no elevated JVP. short neck  LUNGS: Clear to auscultation without wheeze and rhonchi.  HEART: S1 and S2 present without murmur.  ABDOMEN: Soft, nontender, and nondistended.  EXTREMITIES: No edema or calf tenderness.  NEUROLOGIC: Grossly intact.    Labs:    CARDIAC MARKERS ( 08 Feb 2020 07:00 )  x     / x     / 72 u/L / 1.31 ng/mL / x                                12.6   9.52  )-----------( 369      ( 09 Feb 2020 06:00 )             41.0                         12.1   11.29 )-----------( 377      ( 08 Feb 2020 07:00 )             39.4                         12.1   9.05  )-----------( 350      ( 07 Feb 2020 13:59 )             40.2     02-09    136  |  99  |  16  ----------------------------<  120<H>  3.9   |  23  |  0.86  02-08    136  |  97<L>  |  17  ----------------------------<  112<H>  3.6   |  24  |  0.83  02-07    138  |  101  |  14  ----------------------------<  105<H>  3.7   |  25  |  0.85    Ca    9.4      09 Feb 2020 06:00  Ca    9.3      08 Feb 2020 07:00  Ca    9.4      07 Feb 2020 13:59  Mg     2.1     02-09  Mg     2.2     02-08    TPro  7.8  /  Alb  4.0  /  TBili  0.2  /  DBili  x   /  AST  10  /  ALT  9   /  AlkPhos  111  02-07    CAPILLARY BLOOD GLUCOSE          LIVER FUNCTIONS - ( 07 Feb 2020 13:59 )  Alb: 4.0 g/dL / Pro: 7.8 g/dL / ALK PHOS: 111 u/L / ALT: 9 u/L / AST: 10 u/L / GGT: x               Serum Pro-Brain Natriuretic Peptide: 411.1 pg/mL (02-07 @ 15:27)  Serum Pro-Brain Natriuretic Peptide: 440.5 pg/mL (02-07 @ 13:59)      Studies  Chest X-RAY  < from: Xray Chest 2 Views PA/Lat (02.07.20 @ 14:47) >    EXAM:  XR CHEST PA LAT 2V        PROCEDURE DATE:  Feb 7 2020         INTERPRETATION:  CLINICAL INDICATION: chest pain    EXAM:  Frontal and lateral chest from 2/7/2020 at 1447. Compared to prior study from 7/28/2017.    IMPRESSION:  Clear lungs. No pleural effusions or pneumothorax.    Cardiac and mediastinal silhouettes within normal limits.    Trachea midline.    Spinal degenerative change again noted. Unremarkable remaining visualized osseous structures.    < end of copied text >    CT SCAN Chest   < from: CT Chest No Cont (02.07.20 @ 20:20) >  EXAM:  CT CHEST        PROCEDURE DATE:  Feb 7 2020         INTERPRETATION:  CLINICAL INFORMATION: Dyspnea and shortness of breath. Pleural effusions noted on echocardiogram.     COMPARISON: Chest CT from 5/2/2013    PROCEDURE:   CT of the Chest was performed without intravenous contrast.  Sagittal and coronal reformats were performed.      FINDINGS:    LUNGS AND AIRWAYS: Patent central airways.  Lungs are clear.    PLEURA: No pleural effusion. No pneumothorax.    MEDIASTINUM AND GONZÁLEZ: No lymphadenopathy.    VESSELS: The aorta is normal in caliber. The descending thoracic aorta is tortuous. The pulmonary artery is normal in caliber. Aortic calcifications.    HEART: Heart size is normal. No pericardial effusion. Coronary artery calcifications. Aortic valve calcifications can indicate underlying aortic valve disease.    CHEST WALL AND LOWER NECK: Within normal limits.    VISUALIZED UPPER ABDOMEN: Left adrenal gland is thickened. The right adrenal gland is thickened. Arising from the lateral limb of the right adrenal gland is a 6 x 6 mm nodule. Arising from the lateral limb of the left adrenal gland is a 14 x 10 mm nodule. Hepatic steatosis. Diverticulosis.    BONES: Degenerative change of the spine.    IMPRESSION:     1.  Clear lungs.  2.  Hepatic steatosis.  3.  Bilateral adrenal gland thickening and nodules. The nodules likely represent adenomas.    < end of copied text >    Venous Dopplers: LE:   n/a   CT Abdomen  < from: CT Abdomen and Pelvis w/Cont (05.18.15 @ 21:51) >   EXAM:  CT ABD AND PELV W CON        PROCEDURE DATE:  05/18/2015      INTERPRETATION:  CLINICAL INFORMATION: Status post hysterectomy with   abdominal wall pain.    COMPARISON: None    TECHNIQUE: Axial CT images of the abdomen pelvis were acquired following   the uneventful intravenous administration of 95 cc of Omnipaque 350. 5 cc   of contrast were discarded. Oral contrast was ingested. Coronal and   sagittal reconstructions are provided.    FINDINGS:    A small, narrow necked supraumbilical ventral hernia contains infiltrated   omentum. No bowel herniation is seen.    The bowel, including the appendix, is unremarkable. No bowel obstruction   or internal hernia is seen.     Nodular thickening of bilateral adrenal glands is seen. The remainder of   the solid abdominal viscera are unremarkable.    No enlarged abdominal or pelvic lymph nodes are seen.    There is no ascites or fluid collection seen.    The aorta is of normal caliber.    The urinary bladder is unremarkable.     The uterusand ovaries have been removed.    Lung bases are clear.    No significant osseous abnormality is seen.    IMPRESSION: Small fat-containing ventral hernia with concern for   strangulated omentum.              MIKE WEEMS M.D., ATTENDING RADIOLOGIST  This examination was interpreted on: May 18 2015  9:41P.  This document   has been electronically signed. May 18 2015  9:55P.          < end of copied text >    Others  n/a Patient is a 59y old  Female who presents with a chief complaint of SOB (09 Feb 2020 10:23)    Any change in ROS: feels better than yesterday. Used cPAP last night. SOB + but alleviated, Chest pain +but alleviated     MEDICATIONS  (STANDING):  amLODIPine   Tablet 10 milliGRAM(s) Oral daily  aspirin enteric coated 81 milliGRAM(s) Oral daily  cyanocobalamin 1000 MICROGram(s) Oral daily  enoxaparin Injectable 40 milliGRAM(s) SubCutaneous daily  FLUoxetine 40 milliGRAM(s) Oral daily  folic acid 1 milliGRAM(s) Oral daily  furosemide   Injectable 40 milliGRAM(s) IV Push daily  gabapentin 400 milliGRAM(s) Oral two times a day  glycopyrrolate 9 MICROgram(s)/formoterol 4.8 MICROgram(s) Inhaler 2 Puff(s) Inhalation two times a day  rosuvastatin 40 milliGRAM(s) Oral at bedtime    MEDICATIONS  (PRN):  acetaminophen   Tablet .. 650 milliGRAM(s) Oral every 6 hours PRN Temp greater or equal to 38C (100.4F), Mild Pain (1 - 3), Moderate Pain (4 - 6), Severe Pain (7 - 10)  traZODone 100 milliGRAM(s) Oral at bedtime PRN insomnia    Vital Signs Last 24 Hrs  T(C): 36.8 (09 Feb 2020 09:20), Max: 36.8 (08 Feb 2020 21:53)  T(F): 98.2 (09 Feb 2020 09:20), Max: 98.3 (09 Feb 2020 01:50)  HR: 60 (09 Feb 2020 09:20) (44 - 71)  BP: 153/70 (09 Feb 2020 09:20) (152/90 - 190/103)  BP(mean): --  RR: 16 (09 Feb 2020 09:20) (15 - 17)  SpO2: 100% (09 Feb 2020 09:20) (98% - 100%)    I&O's Summary    08 Feb 2020 07:01  -  09 Feb 2020 07:00  --------------------------------------------------------  IN: 1550 mL / OUT: 2050 mL / NET: -500 mL          Physical Exam:   GENERAL: The patient comfortable with no apparent distress. obese  HEENT: Head is normocephalic and atraumatic.    NECK: Supple with no elevated JVP. short neck  LUNGS: Clear to auscultation without wheeze and rhonchi.  HEART: S1 and S2 present without murmur.  ABDOMEN: Soft, nontender, and nondistended.  EXTREMITIES: No edema or calf tenderness.  NEUROLOGIC: Grossly intact.    Labs:    CARDIAC MARKERS ( 08 Feb 2020 07:00 )  x     / x     / 72 u/L / 1.31 ng/mL / x                                12.6   9.52  )-----------( 369      ( 09 Feb 2020 06:00 )             41.0                         12.1   11.29 )-----------( 377      ( 08 Feb 2020 07:00 )             39.4                         12.1   9.05  )-----------( 350      ( 07 Feb 2020 13:59 )             40.2     02-09    136  |  99  |  16  ----------------------------<  120<H>  3.9   |  23  |  0.86  02-08    136  |  97<L>  |  17  ----------------------------<  112<H>  3.6   |  24  |  0.83  02-07    138  |  101  |  14  ----------------------------<  105<H>  3.7   |  25  |  0.85    Ca    9.4      09 Feb 2020 06:00  Ca    9.3      08 Feb 2020 07:00  Ca    9.4      07 Feb 2020 13:59  Mg     2.1     02-09  Mg     2.2     02-08    TPro  7.8  /  Alb  4.0  /  TBili  0.2  /  DBili  x   /  AST  10  /  ALT  9   /  AlkPhos  111  02-07    CAPILLARY BLOOD GLUCOSE          LIVER FUNCTIONS - ( 07 Feb 2020 13:59 )  Alb: 4.0 g/dL / Pro: 7.8 g/dL / ALK PHOS: 111 u/L / ALT: 9 u/L / AST: 10 u/L / GGT: x               Serum Pro-Brain Natriuretic Peptide: 411.1 pg/mL (02-07 @ 15:27)  Serum Pro-Brain Natriuretic Peptide: 440.5 pg/mL (02-07 @ 13:59)      Studies  Chest X-RAY  < from: Xray Chest 2 Views PA/Lat (02.07.20 @ 14:47) >    EXAM:  XR CHEST PA LAT 2V        PROCEDURE DATE:  Feb 7 2020         INTERPRETATION:  CLINICAL INDICATION: chest pain    EXAM:  Frontal and lateral chest from 2/7/2020 at 1447. Compared to prior study from 7/28/2017.    IMPRESSION:  Clear lungs. No pleural effusions or pneumothorax.    Cardiac and mediastinal silhouettes within normal limits.    Trachea midline.    Spinal degenerative change again noted. Unremarkable remaining visualized osseous structures.    < end of copied text >    CT SCAN Chest   < from: CT Chest No Cont (02.07.20 @ 20:20) >  EXAM:  CT CHEST        PROCEDURE DATE:  Feb 7 2020         INTERPRETATION:  CLINICAL INFORMATION: Dyspnea and shortness of breath. Pleural effusions noted on echocardiogram.     COMPARISON: Chest CT from 5/2/2013    PROCEDURE:   CT of the Chest was performed without intravenous contrast.  Sagittal and coronal reformats were performed.      FINDINGS:    LUNGS AND AIRWAYS: Patent central airways.  Lungs are clear.    PLEURA: No pleural effusion. No pneumothorax.    MEDIASTINUM AND GONZÁLEZ: No lymphadenopathy.    VESSELS: The aorta is normal in caliber. The descending thoracic aorta is tortuous. The pulmonary artery is normal in caliber. Aortic calcifications.    HEART: Heart size is normal. No pericardial effusion. Coronary artery calcifications. Aortic valve calcifications can indicate underlying aortic valve disease.    CHEST WALL AND LOWER NECK: Within normal limits.    VISUALIZED UPPER ABDOMEN: Left adrenal gland is thickened. The right adrenal gland is thickened. Arising from the lateral limb of the right adrenal gland is a 6 x 6 mm nodule. Arising from the lateral limb of the left adrenal gland is a 14 x 10 mm nodule. Hepatic steatosis. Diverticulosis.    BONES: Degenerative change of the spine.    IMPRESSION:     1.  Clear lungs.  2.  Hepatic steatosis.  3.  Bilateral adrenal gland thickening and nodules. The nodules likely represent adenomas.    < end of copied text >    Venous Dopplers: LE:   n/a   CT Abdomen  < from: CT Abdomen and Pelvis w/Cont (05.18.15 @ 21:51) >   EXAM:  CT ABD AND PELV W CON        PROCEDURE DATE:  05/18/2015      INTERPRETATION:  CLINICAL INFORMATION: Status post hysterectomy with   abdominal wall pain.    COMPARISON: None    TECHNIQUE: Axial CT images of the abdomen pelvis were acquired following   the uneventful intravenous administration of 95 cc of Omnipaque 350. 5 cc   of contrast were discarded. Oral contrast was ingested. Coronal and   sagittal reconstructions are provided.    FINDINGS:    A small, narrow necked supraumbilical ventral hernia contains infiltrated   omentum. No bowel herniation is seen.    The bowel, including the appendix, is unremarkable. No bowel obstruction   or internal hernia is seen.     Nodular thickening of bilateral adrenal glands is seen. The remainder of   the solid abdominal viscera are unremarkable.    No enlarged abdominal or pelvic lymph nodes are seen.    There is no ascites or fluid collection seen.    The aorta is of normal caliber.    The urinary bladder is unremarkable.     The uterusand ovaries have been removed.    Lung bases are clear.    No significant osseous abnormality is seen.    IMPRESSION: Small fat-containing ventral hernia with concern for   strangulated omentum.              MIKE WEEMS M.D., ATTENDING RADIOLOGIST  This examination was interpreted on: May 18 2015  9:41P.  This document   has been electronically signed. May 18 2015  9:55P.          < end of copied text >    Others  n/a       < from: CT Chest No Cont (02.07.20 @ 20:20) >  o pleural effusion. No pneumothorax.    MEDIASTINUM AND GONZÁLEZ: No lymphadenopathy.    VESSELS: The aorta is normal in caliber. The descending thoracic aorta is tortuous. The pulmonary artery is normal in caliber. Aortic calcifications.    HEART: Heart size is normal. No pericardial effusion. Coronary artery calcifications. Aortic valve calcifications can indicate underlying aortic valve disease.    CHEST WALL AND LOWER NECK: Within normal limits.    VISUALIZED UPPER ABDOMEN: Left adrenal gland is thickened. The right adrenal gland is thickened. Arising from the lateral limb of the right adrenal gland is a 6 x 6 mm nodule. Arising from the lateral limb of the left adrenal gland is a 14 x 10 mm nodule. Hepatic steatosis. Diverticulosis.    BONES: Degenerative change of the spine.    IMPRESSION:     1.  Clear lungs.  2.  Hepatic steatosis.  3.  Bilateral adrenal gland thickening and nodules. The nodules likely represent adenomas.                  ARIANE PICKARD M.D., RADIOLOGY RESIDENT  This document has been electronically signed.  SEAN TOLBERT M.D., ATTENDING RADIOLOGIST  This document has been electronically signed. Feb 8 2020  1:28PM              < end of copied text >

## 2020-02-09 NOTE — CONSULT NOTE ADULT - ASSESSMENT
58 y/o F w/ PMHx of HTN, HLD, COPD, former smoker (quit 2 yrs ago; 35 pack yrs), presenting with chest pain and dyspnea. Patient states chest pain and dyspnea started 1.5 weeks ago. Chest pain feels like a squeezing sensation around her heart; denies any radiation of pain. States chest pain is constant but is exacerbated with exertion. SOB is also exacerbated by exertion. Can walk about 1/2 block before getting SOB and experiencing worsening chest pain. States she has also been having lower extremity edema which started 2 weeks ago. Now LE edema is more improved but denies taking any diuretics. Is unable to lay flat as gets significantly dyspneic. States she went OSH ([facility]) due to the worsening LE edema about 2 weeks ago. Per patient was only hospitalized 1 day and was told to follow up outpatient with cardiologist. States she went to see her cardiologist (Dr. Gutierrez) yesterday 2/6 at which time he did an echo; patient was advised to go to ED for admission as was found to have pleural effusions on echo. Patient denies fever, chills, significant wt loss/gain, sputum production, palpitations, abdominal pain, N/V/D, dysuria, recent travel, or sick contacts.     Problem/Plan - 1:  ·  Problem: Chest pain of unknown etiology.  Plan: NO more CP .   Trop neg .  TTE pending.  Cardiology helping.      Problem/Plan - 2:  ·  Problem: Dyspnea.  Plan: CT chest noted.   Pulmonary consult noted.      Problem/Plan - 3:  ·  Problem: Hypertensive urgency.  Plan: BP readings better.  Continue home meds  Monitor BP q4 hrs  Sodium restriction.      Problem/Plan - 4:  ·  Problem: Mixed hyperlipidemia.  Plan: Continue crestor  Check FLP  DASH diet.      Problem/Plan - 5:  ·  Problem: ALKA   Plan: On CPAP

## 2020-02-09 NOTE — PROVIDER CONTACT NOTE (CHANGE IN STATUS NOTIFICATION) - BACKGROUND
59 year old female admitted with chest pain, dyspnea, orthopnea and hypertensive urgency. PMH of anxeity and hypertension.

## 2020-02-09 NOTE — PROGRESS NOTE ADULT - PROBLEM SELECTOR PLAN 2
she has copd: but I don't think she is having copd exacerbation: her ct chest to me is clear: official report is pending: she has no pleural effusions too: follow official report: cont bd  2/9 alleviated with current management. Continue current management. Outpatient follow up

## 2020-02-09 NOTE — PROVIDER CONTACT NOTE (CHANGE IN STATUS NOTIFICATION) - ACTION/TREATMENT ORDERED:
Continue to monitor patient. Chest pain, sweating and light-headedness subsided on own. Patient educated to inform RN with any changes.

## 2020-02-09 NOTE — PROGRESS NOTE ADULT - SUBJECTIVE AND OBJECTIVE BOX
Patient c/o intermittent chest pain prior to admit.  Reports SOB much improved, able to lie flat.  Review of systems otherwise (-)  	    MEDICATIONS  (STANDING):  amLODIPine   Tablet 10 milliGRAM(s) Oral daily  aspirin enteric coated 81 milliGRAM(s) Oral daily  cyanocobalamin 1000 MICROGram(s) Oral daily  enoxaparin Injectable 40 milliGRAM(s) SubCutaneous daily  FLUoxetine 40 milliGRAM(s) Oral daily  folic acid 1 milliGRAM(s) Oral daily  furosemide   Injectable 40 milliGRAM(s) IV Push daily  gabapentin 400 milliGRAM(s) Oral two times a day  glycopyrrolate 9 MICROgram(s)/formoterol 4.8 MICROgram(s) Inhaler 2 Puff(s) Inhalation two times a day  rosuvastatin 40 milliGRAM(s) Oral at bedtime    MEDICATIONS  (PRN):  acetaminophen   Tablet .. 650 milliGRAM(s) Oral every 6 hours PRN Temp greater or equal to 38C (100.4F), Mild Pain (1 - 3), Moderate Pain (4 - 6), Severe Pain (7 - 10)  traZODone 100 milliGRAM(s) Oral at bedtime PRN insomnia      LABS:                        12.6   9.52  )-----------( 369      ( 09 Feb 2020 06:00 )             41.0     136  |  99  |  16  ----------------------------<  120<H>  3.9   |  23  |  0.86    Ca    9.4      09 Feb 2020 06:00  Mg     2.1     02-09    TPro  7.8  /  Alb  4.0  /  TBili  0.2  /  DBili  x   /  AST  10  /  ALT  9   /  AlkPhos  111  02-07    Creatinine Trend: 0.86<--, 0.83<--, 0.85<--     CARDIAC MARKERS ( 08 Feb 2020 07:00 )  x     / x     / 72 u/L / 1.31 ng/mL / x          PHYSICAL EXAM  Vital Signs Last 24 Hrs  T(C): 36.8 (09 Feb 2020 09:20), Max: 36.8 (08 Feb 2020 21:53)  T(F): 98.2 (09 Feb 2020 09:20), Max: 98.3 (09 Feb 2020 01:50)  HR: 60 (09 Feb 2020 09:20) (44 - 71)  BP: 153/70 (09 Feb 2020 09:20) (152/90 - 190/103)  RR: 16 (09 Feb 2020 09:20) (15 - 17)  SpO2: 100% (09 Feb 2020 09:20) (98% - 100%)      Gen: Appears well in NAD  HEENT:  (-)icterus (-)pallor  CV: N S1 S2 1/6 ELHAM (+)2 Pulses B/l  Resp:  Clear to ausculatation B/L, normal effort  GI: (+) BS Soft, NT, ND  Lymph:  (-)Edema, (-)obvious lymphadenopathy  Skin: Warm to touch, Normal turgor  Psych: Appropriate mood and affect      TELEMETRY: 	SB/SR    < from: CT Chest No Cont (02.07.20 @ 20:20) >    IMPRESSION:     1.  Clear lungs.  2.  Hepatic steatosis.  3.  Bilateral adrenal gland thickening and nodules. The nodules likely represent adenomas.    < end of copied text >      ASSESSMENT/PLAN: 	    60 y/o F w/ PMHx of HTN, HLD, borderline DM, COPD, former smoker, recent admit to [facility] for CHF, who was seen by Matt in our office 2/6 and found with B/L pleural effusions and admitted for acute on chronic CHF    -- ACS ruled out  -- check echo   --Off Bystolic due to bradycardia on admit and Norvasc started.  Pt reports previously taking Metoprolol and HCTZ for her HTN but stopped due to elevated BP/no response to meds?.  Denies any allergic reactions to medications.  Would favor ACE/ARB as first line therapy in an  female with borderline DM  --check NST given episodes of chest pain  --CT Chest with incidental finding of B/L adrenal nodules.  Will consult Renal

## 2020-02-10 DIAGNOSIS — E27.8 OTHER SPECIFIED DISORDERS OF ADRENAL GLAND: ICD-10-CM

## 2020-02-10 DIAGNOSIS — I50.31 ACUTE DIASTOLIC (CONGESTIVE) HEART FAILURE: ICD-10-CM

## 2020-02-10 LAB
ANION GAP SERPL CALC-SCNC: 13 MMO/L — SIGNIFICANT CHANGE UP (ref 7–14)
APPEARANCE UR: CLEAR — SIGNIFICANT CHANGE UP
BASOPHILS # BLD AUTO: 0.04 K/UL — SIGNIFICANT CHANGE UP (ref 0–0.2)
BASOPHILS NFR BLD AUTO: 0.4 % — SIGNIFICANT CHANGE UP (ref 0–2)
BILIRUB UR-MCNC: NEGATIVE — SIGNIFICANT CHANGE UP
BLOOD UR QL VISUAL: NEGATIVE — SIGNIFICANT CHANGE UP
BUN SERPL-MCNC: 22 MG/DL — SIGNIFICANT CHANGE UP (ref 7–23)
CALCIUM SERPL-MCNC: 9.6 MG/DL — SIGNIFICANT CHANGE UP (ref 8.4–10.5)
CHLORIDE SERPL-SCNC: 101 MMOL/L — SIGNIFICANT CHANGE UP (ref 98–107)
CO2 SERPL-SCNC: 25 MMOL/L — SIGNIFICANT CHANGE UP (ref 22–31)
COLOR SPEC: SIGNIFICANT CHANGE UP
CREAT SERPL-MCNC: 0.98 MG/DL — SIGNIFICANT CHANGE UP (ref 0.5–1.3)
EOSINOPHIL # BLD AUTO: 0.46 K/UL — SIGNIFICANT CHANGE UP (ref 0–0.5)
EOSINOPHIL NFR BLD AUTO: 4.5 % — SIGNIFICANT CHANGE UP (ref 0–6)
GLUCOSE SERPL-MCNC: 112 MG/DL — HIGH (ref 70–99)
GLUCOSE UR-MCNC: NEGATIVE — SIGNIFICANT CHANGE UP
HCT VFR BLD CALC: 41.5 % — SIGNIFICANT CHANGE UP (ref 34.5–45)
HGB BLD-MCNC: 12.8 G/DL — SIGNIFICANT CHANGE UP (ref 11.5–15.5)
IMM GRANULOCYTES NFR BLD AUTO: 0.6 % — SIGNIFICANT CHANGE UP (ref 0–1.5)
KETONES UR-MCNC: NEGATIVE — SIGNIFICANT CHANGE UP
LEUKOCYTE ESTERASE UR-ACNC: NEGATIVE — SIGNIFICANT CHANGE UP
LYMPHOCYTES # BLD AUTO: 2.44 K/UL — SIGNIFICANT CHANGE UP (ref 1–3.3)
LYMPHOCYTES # BLD AUTO: 23.9 % — SIGNIFICANT CHANGE UP (ref 13–44)
MAGNESIUM SERPL-MCNC: 2.1 MG/DL — SIGNIFICANT CHANGE UP (ref 1.6–2.6)
MCHC RBC-ENTMCNC: 26 PG — LOW (ref 27–34)
MCHC RBC-ENTMCNC: 30.8 % — LOW (ref 32–36)
MCV RBC AUTO: 84.3 FL — SIGNIFICANT CHANGE UP (ref 80–100)
MONOCYTES # BLD AUTO: 0.83 K/UL — SIGNIFICANT CHANGE UP (ref 0–0.9)
MONOCYTES NFR BLD AUTO: 8.1 % — SIGNIFICANT CHANGE UP (ref 2–14)
NEUTROPHILS # BLD AUTO: 6.38 K/UL — SIGNIFICANT CHANGE UP (ref 1.8–7.4)
NEUTROPHILS NFR BLD AUTO: 62.5 % — SIGNIFICANT CHANGE UP (ref 43–77)
NITRITE UR-MCNC: NEGATIVE — SIGNIFICANT CHANGE UP
NRBC # FLD: 0 K/UL — SIGNIFICANT CHANGE UP (ref 0–0)
PH UR: 6.5 — SIGNIFICANT CHANGE UP (ref 5–8)
PLATELET # BLD AUTO: 370 K/UL — SIGNIFICANT CHANGE UP (ref 150–400)
PMV BLD: 10.7 FL — SIGNIFICANT CHANGE UP (ref 7–13)
POTASSIUM SERPL-MCNC: 4.4 MMOL/L — SIGNIFICANT CHANGE UP (ref 3.5–5.3)
POTASSIUM SERPL-SCNC: 4.4 MMOL/L — SIGNIFICANT CHANGE UP (ref 3.5–5.3)
PROT UR-MCNC: 10 — SIGNIFICANT CHANGE UP
RBC # BLD: 4.92 M/UL — SIGNIFICANT CHANGE UP (ref 3.8–5.2)
RBC # FLD: 16.1 % — HIGH (ref 10.3–14.5)
SODIUM SERPL-SCNC: 139 MMOL/L — SIGNIFICANT CHANGE UP (ref 135–145)
SP GR SPEC: 1.03 — SIGNIFICANT CHANGE UP (ref 1–1.04)
UROBILINOGEN FLD QL: SIGNIFICANT CHANGE UP
WBC # BLD: 10.21 K/UL — SIGNIFICANT CHANGE UP (ref 3.8–10.5)
WBC # FLD AUTO: 10.21 K/UL — SIGNIFICANT CHANGE UP (ref 3.8–10.5)

## 2020-02-10 PROCEDURE — 93306 TTE W/DOPPLER COMPLETE: CPT | Mod: 26

## 2020-02-10 RX ORDER — LOSARTAN POTASSIUM 100 MG/1
25 TABLET, FILM COATED ORAL DAILY
Refills: 0 | Status: DISCONTINUED | OUTPATIENT
Start: 2020-02-10 | End: 2020-02-10

## 2020-02-10 RX ORDER — LOSARTAN POTASSIUM 100 MG/1
25 TABLET, FILM COATED ORAL DAILY
Refills: 0 | Status: DISCONTINUED | OUTPATIENT
Start: 2020-02-10 | End: 2020-02-12

## 2020-02-10 RX ADMIN — GLYCOPYRROLATE AND FORMOTEROL FUMARATE 2 PUFF(S): 9; 4.8 AEROSOL, METERED RESPIRATORY (INHALATION) at 08:33

## 2020-02-10 RX ADMIN — Medication 40 MILLIGRAM(S): at 11:53

## 2020-02-10 RX ADMIN — AMLODIPINE BESYLATE 10 MILLIGRAM(S): 2.5 TABLET ORAL at 05:18

## 2020-02-10 RX ADMIN — Medication 81 MILLIGRAM(S): at 11:53

## 2020-02-10 RX ADMIN — Medication 40 MILLIGRAM(S): at 05:18

## 2020-02-10 RX ADMIN — Medication 1 MILLIGRAM(S): at 11:53

## 2020-02-10 RX ADMIN — GABAPENTIN 400 MILLIGRAM(S): 400 CAPSULE ORAL at 05:18

## 2020-02-10 RX ADMIN — ENOXAPARIN SODIUM 40 MILLIGRAM(S): 100 INJECTION SUBCUTANEOUS at 11:53

## 2020-02-10 RX ADMIN — GLYCOPYRROLATE AND FORMOTEROL FUMARATE 2 PUFF(S): 9; 4.8 AEROSOL, METERED RESPIRATORY (INHALATION) at 21:21

## 2020-02-10 RX ADMIN — ROSUVASTATIN CALCIUM 40 MILLIGRAM(S): 5 TABLET ORAL at 21:21

## 2020-02-10 RX ADMIN — PREGABALIN 1000 MICROGRAM(S): 225 CAPSULE ORAL at 11:53

## 2020-02-10 RX ADMIN — GABAPENTIN 400 MILLIGRAM(S): 400 CAPSULE ORAL at 17:28

## 2020-02-10 NOTE — CONSULT NOTE ADULT - SUBJECTIVE AND OBJECTIVE BOX
Jorge Tolbert MD (Nephrology)  [address] [facility],  [address],  [id number]  TEl: [phone]  Cell: [phone]    Patient is a 59y old  Female who presents with a chief complaint of SOB (10 Feb 2020 13:53)      HPI:  60 y/o F w/ PMHx of HTN, HLD, COPD, former smoker (quit 2 yrs ago; 35 pack yrs), presenting with chest pain and dyspnea. Patient states chest pain and dyspnea started 1.5 weeks ago. Chest pain feels like a squeezing sensation around her heart; denies any radiation of pain. States chest pain is constant but is exacerbated with exertion. SOB is also exacerbated by exertion. Can walk about 1/2 block before getting SOB and experiencing worsening chest pain. States she has also been having lower extremity edema which started 2 weeks ago. Now LE edema is more improved but denies taking any diuretics. Is unable to lay flat as gets significantly dyspneic. States she went OSH ([facility]) due to the worsening LE edema about 2 weeks ago. Per patient was only hospitalized 1 day and was told to follow up outpatient with cardiologist. States she went to see her cardiologist (Dr. Gutierrez) yesterday  at which time he did an echo; patient was advised to go to ED for admission as was found to have pleural effusions on echo. Patient denies fever, chills, significant wt loss/gain, sputum production, palpitations, abdominal pain, N/V/D, dysuria, recent travel, or sick contacts. (2020 17:07)      PAST MEDICAL & SURGICAL HISTORY:  H/O chronic bronchitis  Bursitis  Insomnia  Stress incontinence, female  Cataract  Right retinal detachment  Carpal tunnel syndrome on left  Ovarian cyst  Rotator cuff tear, left  Migraine aura without headache  Carpal tunnel syndrome, bilateral  TIA (transient ischemic attack): with slurred speach and blurred vision , possible migraines, pt under neurology care  Depression  Sleep apnea  Hiatal hernia  Anxiety  Hyperlipemia  Morbidly obese  HTN (hypertension)  History of carpal tunnel surgery of left wrist: 2017  RD (retinal detachment), right  Status post rotator cuff repair: Left rotator 2016  H/O nasal septoplasty:   S/P hernia repair: incarcerated hernia with mesh- May 2015  History of total knee replacement:   History of shoulder surgery: left shoulder arthroscopy- 2010  History of hysterectomy:         Allergies:  No Known Allergies      Home Medications:   acetaminophen   Tablet .. 650 milliGRAM(s) Oral every 6 hours PRN  amLODIPine   Tablet 10 milliGRAM(s) Oral daily  aspirin enteric coated 81 milliGRAM(s) Oral daily  cyanocobalamin 1000 MICROGram(s) Oral daily  enoxaparin Injectable 40 milliGRAM(s) SubCutaneous daily  FLUoxetine 40 milliGRAM(s) Oral daily  folic acid 1 milliGRAM(s) Oral daily  furosemide   Injectable 40 milliGRAM(s) IV Push daily  gabapentin 400 milliGRAM(s) Oral two times a day  glycopyrrolate 9 MICROgram(s)/formoterol 4.8 MICROgram(s) Inhaler 2 Puff(s) Inhalation two times a day  rosuvastatin 40 milliGRAM(s) Oral at bedtime  traZODone 100 milliGRAM(s) Oral at bedtime PRN      Hospital Medications:   MEDICATIONS  (STANDING):  amLODIPine   Tablet 10 milliGRAM(s) Oral daily  aspirin enteric coated 81 milliGRAM(s) Oral daily  cyanocobalamin 1000 MICROGram(s) Oral daily  enoxaparin Injectable 40 milliGRAM(s) SubCutaneous daily  FLUoxetine 40 milliGRAM(s) Oral daily  folic acid 1 milliGRAM(s) Oral daily  furosemide   Injectable 40 milliGRAM(s) IV Push daily  gabapentin 400 milliGRAM(s) Oral two times a day  glycopyrrolate 9 MICROgram(s)/formoterol 4.8 MICROgram(s) Inhaler 2 Puff(s) Inhalation two times a day  rosuvastatin 40 milliGRAM(s) Oral at bedtime      SOCIAL HISTORY:  Denies ETOh, Smoking,     Family History:  FAMILY HISTORY:  Family history of hypertension  Diabetes mellitus, type 2  Family history of brain cancer      ROS:  CONSTITUTIONAL: No fever or chills.  HEENT: No headche, visual disturbances, ear pain or hearing impairment.  RESPIRATORY: Mild SOB but denies cough, wheezing or hemoptysis  CARDIOVASCULAR: Chest tightness, SOB, leg edema but denies syncope, palpitations  GASTROINTESTINAL: No abdominal pain, nausea, vomiting, diarrhea, hematemesis or blood per rectum.  GENITOURINARY: No urinary frequency, urgency, gross hematuria, dysuria, foamy urine, flank or supra pubic pain, no prior history of kidney stones.  NEUROLOGICAL: No headache, dizziness,  focal limb weakness, tingling or numbness sensation or seizure like activity  SKIN: No rash or skin lesion  MUSCULOSKELETAL: No leg pain, calf pain or swelling  Psych: Denies depression, suicidal or homicidal ideation    VITALS:  T(F): 98.8 (02-10-20 @ 21:35), Max: 98.9 (02-10-20 @ 05:13)  HR: 59 (02-10-20 @ 21:35)  BP: 168/92 (02-10-20 @ 21:35)  RR: 17 (02-10-20 @ 21:35)  SpO2: 98% (02-10-20 @ 21:35)  Wt(kg): --     @ 07:01  -  02-10 @ 07:00  --------------------------------------------------------  IN: 1325 mL / OUT: 1450 mL / NET: -125 mL    02-10 @ 07:01  -  02-10 @ 21:50  --------------------------------------------------------  IN: 720 mL / OUT: 775 mL / NET: -55 mL        CAPILLARY BLOOD GLUCOSE            PHYSICAL EXAM:  GENERAL: Alert, awake, oriented x3   HEENT: TANISHA, EOMI, neck supple, no jVP, no carotid bruit, no palpable thyromegaly or lymphadenopathy, no carotid bruits  CHEST/LUNG: Bilateral decreased breath sounds, Bibasilar minimal crepitations, no wheezing  HEART: Regular rate and rhythm. ELHAM II/VI at LPSB, No gallops, no rub   ABDOMEN: Soft, Nontender, non distended, bowel sounds present, no palpable organomegaly, no guarding, no rigidity, no rebound tenderness.  : No flank or supra pubic tenderness.  EXTREMITIES: Peripheral pulses are palpable, Bilateral trace leg edema  Musculoskeletal: No joint pains or back pain.  Neurology: AAOx3, no focal neurological deficit, Motor and sensory systems are intact.   SKIN: No rashes or skin lesion    LABS:  02-10    139  |  101  |  22  ----------------------------<  112<H>  4.4   |  25  |  0.98    Ca    9.6      10 Feb 2020 05:05  Mg     2.1     02-10      Creatinine Trend: 0.98 <--, 0.86 <--, 0.83 <--, 0.85 <--                        12.8   10.21 )-----------( 370      ( 10 Feb 2020 05:05 )             41.5     Urine Studies:  Urinalysis Basic - ( 10 Feb 2020 16:40 )    Color: LIGHT YELLOW / Appearance: CLEAR / S.027 / pH: 6.5  Gluc: NEGATIVE / Ketone: NEGATIVE  / Bili: NEGATIVE / Urobili: TRACE   Blood: NEGATIVE / Protein: 10 / Nitrite: NEGATIVE   Leuk Esterase: NEGATIVE / RBC:  / WBC    Sq Epi:  / Non Sq Epi:  / Bacteria:           RADIOLOGY & ADDITIONAL STUDIES:  < from: CT Chest No Cont (20 @ 20:20) >    EXAM:  CT CHEST        PROCEDURE DATE:  2020         INTERPRETATION:  CLINICAL INFORMATION: Dyspnea and shortness of breath. Pleural effusions noted on echocardiogram.     COMPARISON: Chest CT from 2013    PROCEDURE:   CT of the Chest was performed without intravenous contrast.  Sagittal and coronal reformats were performed.      FINDINGS:    LUNGS AND AIRWAYS: Patent central airways.  Lungs are clear.    PLEURA: No pleural effusion. No pneumothorax.    MEDIASTINUM AND GONZÁLEZ: No lymphadenopathy.    VESSELS: The aorta is normal in caliber. The descending thoracic aorta is tortuous. The pulmonary artery is normal in caliber. Aortic calcifications.    HEART: Heart size is normal. No pericardial effusion. Coronary artery calcifications. Aortic valve calcifications can indicate underlying aortic valve disease.    CHEST WALL AND LOWER NECK: Within normal limits.    VISUALIZED UPPER ABDOMEN: Left adrenal gland is thickened. The right adrenal gland is thickened. Arising from the lateral limb of the right adrenal gland is a 6 x 6 mm nodule. Arising from the lateral limb of the left adrenal gland is a 14 x 10 mm nodule. Hepatic steatosis. Diverticulosis.    BONES: Degenerative change of the spine.    IMPRESSION:     1.  Clear lungs.  2.  Hepatic steatosis.  3.  Bilateral adrenal gland thickening and nodules. The nodules likely represent adenomas.          < from: Transthoracic Echocardiogram (02.10.20 @ 09:59) >    Patient name: JOSE IVORY  YOB: 1960   Age: 59 (F)   MR#: [id number]  Study Date: 2/10/2020  Location: [place] [address] StressSonographer: Angelina Chambers  [facility]  Study quality: Technically Fair  Referring Physician: Puneet Nunez MD  Blood Pressure: 136/59 mmHg  Height: 167 cm  Weight: 117 kg  BSA: 2.2 m2  ------------------------------------------------------------------------  PROCEDURE: Transthoracic echocardiogram with 2-D, M-Mode  and complete spectral and color flow Doppler.  INDICATION: Chest pain, unspecified (R07.9), Dyspnea,  unspecified (R06.00)  ------------------------------------------------------------------------  DIMENSIONS:  Dimensions:     Normal Values:  LA:     3.8 cm    2.0 - 4.0 cm  Ao:     3.3 cm    2.0 - 3.8 cm  SEPTUM: 0.9 cm    0.6 - 1.2 cm  PWT:    0.8 cm    0.6 - 1.1 cm  LVIDd:  5.4 cm    3.0 - 5.6 cm  LVIDs:  3.4 cm    1.8 - 4.0 cm  Derived Variables:  LVMI: 75 g/m2  RWT: 0.29  Fractional short: 37 %  Ejection Fraction (Teicholtz): 66 %  ------------------------------------------------------------------------  OBSERVATIONS:  Mitral Valve: Mitral annular calcification, otherwise  normal mitral valve. Minimal mitral regurgitation.  Aortic Root: Normal aortic root.  Aortic Valve: Calcified trileaflet aortic valve with normal  opening.  Left Atrium: Normal left atrium.  LA volume index = 28  cc/m2.  Left Ventricle: Normal left ventricular systolic function.  No segmental wall motion abnormalities. Normal left  ventricular internal dimensions and wall thicknesses. Mild  diastolic dysfunction (Stage I).  Right Heart: Normal right atrium. Normal right ventricular  size and function. Normal tricuspid valve. Minimal  tricuspid regurgitation. Normal pulmonic valve. Minimal  pulmonic regurgitation.  Pericardium/PleuraNormal pericardium with trace pericardial  effusion. Left pleural effusion.  ------------------------------------------------------------------------  CONCLUSIONS:  1. Mitral annular calcification, otherwise normal mitral  valve.Minimal mitral regurgitation.  2. Normal left ventricular internal dimensions and wall  thicknesses.  3. Normal left ventricular systolic function. No segmental  wall motion abnormalities.  4. Mild diastolic dysfunction (Stage I).  5. Normal right ventricular size and function.  ------------------------------------------------------------------------  Confirmed on  2/10/2020 - 11:16:20 by Derrick Bazzi M.D.  ------------------------------------------------------------------------    < end of copied text >          ARIANE PICKARD M.D., RADIOLOGY RESIDENT  This document has been electronically signed.  SEAN TOLBERT M.D., ATTENDING RADIOLOGIST  This document has been electronically signed. 2020  1:28PM                  < end of copied text >      EKG findings reviewed.    Imaging Personally Reviewed:  [x] YES  [ ] NO    Consultant(s) and primary physician Notes Reviewed:  [x] YES  [ ] NO    Care Discussed with Primary team/ Consultants/Other Providers [x] YES  [ ] NO

## 2020-02-10 NOTE — PROGRESS NOTE ADULT - SUBJECTIVE AND OBJECTIVE BOX
S: no chest pain or sob;  Review of systems otherwise (-)  	  acetaminophen   Tablet .. 650 milliGRAM(s) Oral every 6 hours PRN  amLODIPine   Tablet 10 milliGRAM(s) Oral daily  aspirin enteric coated 81 milliGRAM(s) Oral daily  cyanocobalamin 1000 MICROGram(s) Oral daily  enoxaparin Injectable 40 milliGRAM(s) SubCutaneous daily  FLUoxetine 40 milliGRAM(s) Oral daily  folic acid 1 milliGRAM(s) Oral daily  furosemide   Injectable 40 milliGRAM(s) IV Push daily  gabapentin 400 milliGRAM(s) Oral two times a day  glycopyrrolate 9 MICROgram(s)/formoterol 4.8 MICROgram(s) Inhaler 2 Puff(s) Inhalation two times a day  rosuvastatin 40 milliGRAM(s) Oral at bedtime  traZODone 100 milliGRAM(s) Oral at bedtime PRN                            12.8   10.21 )-----------( 370      ( 10 Feb 2020 05:05 )             41.5       02-10    139  |  101  |  22  ----------------------------<  112<H>  4.4   |  25  |  0.98    Ca    9.6      10 Feb 2020 05:05  Mg     2.1     02-10        CARDIAC MARKERS ( 08 Feb 2020 07:00 )  x     / x     / 72 u/L / 1.31 ng/mL / x            T(C): 36.9 (02-10-20 @ 13:10), Max: 37.2 (02-10-20 @ 05:13)  HR: 67 (02-10-20 @ 13:10) (52 - 67)  BP: 139/81 (02-10-20 @ 13:10) (130/57 - 157/66)  RR: 17 (02-10-20 @ 13:10) (15 - 18)  SpO2: 99% (02-10-20 @ 13:10) (95% - 100%)  Wt(kg): --    I&O's Summary    09 Feb 2020 07:01  -  10 Feb 2020 07:00  --------------------------------------------------------  IN: 1325 mL / OUT: 1450 mL / NET: -125 mL    10 Feb 2020 07:01  -  10 Feb 2020 13:55  --------------------------------------------------------  IN: 480 mL / OUT: 450 mL / NET: 30 mL      HEENT:  (-)icterus (-)pallor  CV: N S1 S2 1/6 ELHAM (+)2 Pulses B/l  Resp:  Clear to ausculatation B/L, normal effort  GI: (+) BS Soft, NT, ND  Lymph:  (-)Edema, (-)obvious lymphadenopathy  Skin: Warm to touch, Normal turgor  Psych: Appropriate mood and affect      TELEMETRY: 	SB/SR    < from: CT Chest No Cont (02.07.20 @ 20:20) >    IMPRESSION:     1.  Clear lungs.  2.  Hepatic steatosis.  3.  Bilateral adrenal gland thickening and nodules. The nodules likely represent adenomas.    < end of copied text >      TTE: < from: Transthoracic Echocardiogram (02.10.20 @ 09:59) >  CONCLUSIONS:  1. Mitral annular calcification, otherwise normal mitral  valve.Minimal mitral regurgitation.  2. Normal left ventricular internal dimensions and wall  thicknesses.  3. Normal left ventricular systolic function. No segmental  wall motion abnormalities.  4. Mild diastolic dysfunction (Stage I).  5. Normal right ventricular size and function.    < end of copied text >      ASSESSMENT/PLAN: 	    60 y/o F w/ PMHx of HTN, HLD, borderline DM, COPD, former smoker, recent admit to [facility] for CHF, who was seen by Matt in our office 2/6 and found with B/L pleural effusions and admitted for acute on chronic CHF    -- ACS ruled out  -- TTE with normal LV function  -- Appreciate Pulm eval  -- Check NST  -- Renal eval for adrenal nodules    Joaquin Stoddard MD

## 2020-02-10 NOTE — PROVIDER CONTACT NOTE (OTHER) - ASSESSMENT
Pt A&Ox4. Sinus bradycardic on telemetry monitor while sleeping, went as low as 44 and sustaining in high 40s-50s. Pt asymptomatic. Denies chest pain and SOB. Vital signs stable. Breathing comfortably on CPAP at this time.

## 2020-02-10 NOTE — PROGRESS NOTE ADULT - SUBJECTIVE AND OBJECTIVE BOX
INTERVAL HPI/OVERNIGHT EVENTS: No new concerns.   Vital Signs Last 24 Hrs  T(C): 36.9 (10 Feb 2020 13:10), Max: 37.2 (10 Feb 2020 05:13)  T(F): 98.5 (10 Feb 2020 13:10), Max: 98.9 (10 Feb 2020 05:13)  HR: 55 (10 Feb 2020 14:54) (52 - 67)  BP: 139/81 (10 Feb 2020 13:10) (130/57 - 156/72)  BP(mean): --  RR: 17 (10 Feb 2020 13:10) (15 - 18)  SpO2: 97% (10 Feb 2020 14:54) (95% - 100%)  I&O's Summary    2020 07:01  -  10 Feb 2020 07:00  --------------------------------------------------------  IN: 1325 mL / OUT: 1450 mL / NET: -125 mL    10 Feb 2020 07:01  -  10 Feb 2020 18:53  --------------------------------------------------------  IN: 480 mL / OUT: 450 mL / NET: 30 mL      MEDICATIONS  (STANDING):  amLODIPine   Tablet 10 milliGRAM(s) Oral daily  aspirin enteric coated 81 milliGRAM(s) Oral daily  cyanocobalamin 1000 MICROGram(s) Oral daily  enoxaparin Injectable 40 milliGRAM(s) SubCutaneous daily  FLUoxetine 40 milliGRAM(s) Oral daily  folic acid 1 milliGRAM(s) Oral daily  furosemide   Injectable 40 milliGRAM(s) IV Push daily  gabapentin 400 milliGRAM(s) Oral two times a day  glycopyrrolate 9 MICROgram(s)/formoterol 4.8 MICROgram(s) Inhaler 2 Puff(s) Inhalation two times a day  rosuvastatin 40 milliGRAM(s) Oral at bedtime    MEDICATIONS  (PRN):  acetaminophen   Tablet .. 650 milliGRAM(s) Oral every 6 hours PRN Temp greater or equal to 38C (100.4F), Mild Pain (1 - 3), Moderate Pain (4 - 6), Severe Pain (7 - 10)  traZODone 100 milliGRAM(s) Oral at bedtime PRN insomnia    LABS:                        12.8   10.21 )-----------( 370      ( 10 Feb 2020 05:05 )             41.5     02-10    139  |  101  |  22  ----------------------------<  112<H>  4.4   |  25  |  0.98    Ca    9.6      10 Feb 2020 05:05  Mg     2.1     02-10        Urinalysis Basic - ( 10 Feb 2020 16:40 )    Color: LIGHT YELLOW / Appearance: CLEAR / S.027 / pH: 6.5  Gluc: NEGATIVE / Ketone: NEGATIVE  / Bili: NEGATIVE / Urobili: TRACE   Blood: NEGATIVE / Protein: 10 / Nitrite: NEGATIVE   Leuk Esterase: NEGATIVE / RBC: x / WBC x   Sq Epi: x / Non Sq Epi: x / Bacteria: x      CAPILLARY BLOOD GLUCOSE            Urinalysis Basic - ( 10 Feb 2020 16:40 )    Color: LIGHT YELLOW / Appearance: CLEAR / S.027 / pH: 6.5  Gluc: NEGATIVE / Ketone: NEGATIVE  / Bili: NEGATIVE / Urobili: TRACE   Blood: NEGATIVE / Protein: 10 / Nitrite: NEGATIVE   Leuk Esterase: NEGATIVE / RBC: x / WBC x   Sq Epi: x / Non Sq Epi: x / Bacteria: x      REVIEW OF SYSTEMS:  CONSTITUTIONAL: No fever, weight loss, or fatigue  EYES: No eye pain, visual disturbances, or discharge  ENMT:  No difficulty hearing, tinnitus, vertigo; No sinus or throat pain  NECK: No pain or stiffness  RESPIRATORY: No cough, wheezing, chills or hemoptysis; No shortness of breath  CARDIOVASCULAR: No chest pain, palpitations, dizziness, or leg swelling  GASTROINTESTINAL: No abdominal or epigastric pain. No nausea, vomiting, or hematemesis; No diarrhea or constipation. No melena or hematochezia.  GENITOURINARY: No dysuria, frequency, hematuria, or incontinence  NEUROLOGICAL: No headaches, memory loss, loss of strength, numbness, or tremors      Consultant(s) Notes Reviewed:  [x ] YES  [ ] NO    PHYSICAL EXAM:  GENERAL: NAD, well-groomed, well-developed ,not in any distress ,  HEAD:  Atraumatic, Normocephalic  EYES: EOMI, PERRLA, conjunctiva and sclera clear  ENMT: No tonsillar erythema, exudates, or enlargement; Moist mucous membranes, Good dentition, No lesions  NECK: Supple, No JVD, Normal thyroid  NERVOUS SYSTEM:  Alert & Oriented X3, No focal deficit   CHEST/LUNG: Good air entry bilateral with no  rales, rhonchi, wheezing, or rubs  HEART: Regular rate and rhythm; No murmurs, rubs, or gallops  ABDOMEN: Soft, Nontender, Nondistended; Bowel sounds present  EXTREMITIES:  2+ Peripheral Pulses, No clubbing, cyanosis, or edema      Care Discussed with Consultants/Other Providers [ x] YES  [ ] NO

## 2020-02-10 NOTE — CONSULT NOTE ADULT - PROBLEM SELECTOR RECOMMENDATION 9
Patient with hypertensive urgency and fluid overload likely Essential hypertension and/or secondary hypertensive disorders with adrenal nodules  - Low salt diet  - Weight reduction  - Monitor vital signs  - Echocardiogram to assess LVEF/wall motion abnormalities.  - Continue amlodipine 10mg po daily  - Consider add low dose ACEI/ARB for better blood pressure control  - Bilateral renal ultrasound with doppler study to assess RAAS.  - Work up for secondary hypertension as per ordered.

## 2020-02-10 NOTE — CONSULT NOTE ADULT - PROBLEM SELECTOR RECOMMENDATION 4
Acute diastolic CHF with volume overload and hypertensive urgency  - Low salt diet  - strict I/o  - Daily weight  - Fluid restriction to <1 to 1.2L/day  - Optimal BP control with po medications  - Consider add low dose ACEI/ARB if no contraindication  - Continue betablocker once optimal volume control with bystolic.  - Ischemia work up per cardiology team.

## 2020-02-10 NOTE — CONSULT NOTE ADULT - PROBLEM SELECTOR RECOMMENDATION 2
Patient with hypertensive urgency with long standing hypertension with bilateral incidental adrenal nodules  - Check plasma renin activity, Serum aldosterone level to assess functionality  - CT chest with out contrast reviewed  - Monitor urine electrolytes as per ordered  - Work up for secondary hypertension as per ordered.

## 2020-02-10 NOTE — PROGRESS NOTE ADULT - PROBLEM SELECTOR PLAN 2
she has copd: but I don't think she is having copd exacerbation: her ct chest to me is clear: official report is pending: she has no pleural effusions too: follow official report: cont bd  2/9 alleviated with current management. Continue current management. Outpatient follow up  2/10: she says she feels better with cpap: but not obiously SOB at this time and nor she is wheezing

## 2020-02-10 NOTE — CONSULT NOTE ADULT - ASSESSMENT
58 y/o F w/ PMHx of HTN, HLD, COPD, former smoker (quit 2 yrs ago; 35 pack yrs), presenting with chest pain and dyspnea. Patient states chest pain and dyspnea started 1.5 weeks ago

## 2020-02-10 NOTE — PROGRESS NOTE ADULT - SUBJECTIVE AND OBJECTIVE BOX
Patient is a 59y old  Female who presents with a chief complaint of SOB (09 Feb 2020 11:45)      Any change in ROS: Doing ok :? s/p echo     MEDICATIONS  (STANDING):  amLODIPine   Tablet 10 milliGRAM(s) Oral daily  aspirin enteric coated 81 milliGRAM(s) Oral daily  cyanocobalamin 1000 MICROGram(s) Oral daily  enoxaparin Injectable 40 milliGRAM(s) SubCutaneous daily  FLUoxetine 40 milliGRAM(s) Oral daily  folic acid 1 milliGRAM(s) Oral daily  furosemide   Injectable 40 milliGRAM(s) IV Push daily  gabapentin 400 milliGRAM(s) Oral two times a day  glycopyrrolate 9 MICROgram(s)/formoterol 4.8 MICROgram(s) Inhaler 2 Puff(s) Inhalation two times a day  rosuvastatin 40 milliGRAM(s) Oral at bedtime    MEDICATIONS  (PRN):  acetaminophen   Tablet .. 650 milliGRAM(s) Oral every 6 hours PRN Temp greater or equal to 38C (100.4F), Mild Pain (1 - 3), Moderate Pain (4 - 6), Severe Pain (7 - 10)  traZODone 100 milliGRAM(s) Oral at bedtime PRN insomnia    Vital Signs Last 24 Hrs  T(C): 36.7 (10 Feb 2020 09:13), Max: 37.2 (10 Feb 2020 05:13)  T(F): 98.1 (10 Feb 2020 09:13), Max: 98.9 (10 Feb 2020 05:13)  HR: 57 (10 Feb 2020 09:21) (52 - 64)  BP: 141/63 (10 Feb 2020 09:13) (141/63 - 158/78)  BP(mean): --  RR: 18 (10 Feb 2020 09:13) (15 - 18)  SpO2: 96% (10 Feb 2020 09:21) (95% - 100%)    I&O's Summary    09 Feb 2020 07:01  -  10 Feb 2020 07:00  --------------------------------------------------------  IN: 1325 mL / OUT: 1450 mL / NET: -125 mL    10 Feb 2020 07:01  -  10 Feb 2020 11:09  --------------------------------------------------------  IN: 240 mL / OUT: 300 mL / NET: -60 mL          Physical Exam:   GENERAL: NAD, well-groomed, well-developed  HEENT: TANISHA/   Atraumatic, Normocephalic  ENMT: No tonsillar erythema, exudates, or enlargement; Moist mucous membranes, Good dentition, No lesions  NECK: Supple, No JVD, Normal thyroid  CHEST/LUNG: no wheezing  CVS: Regular rate and rhythm; No murmurs, rubs, or gallops  GI: : Soft, Nontender, Nondistended; Bowel sounds present  NERVOUS SYSTEM:  Alert & Oriented X3  EXTREMITIES:  2+ Peripheral Pulses, No clubbing, cyanosis, or edema  LYMPH: No lymphadenopathy noted  SKIN: No rashes or lesions  ENDOCRINOLOGY: No Thyromegaly  PSYCH: Appropriate    Labs:                              12.8   10.21 )-----------( 370      ( 10 Feb 2020 05:05 )             41.5                         12.6   9.52  )-----------( 369      ( 09 Feb 2020 06:00 )             41.0                         12.1   11.29 )-----------( 377      ( 08 Feb 2020 07:00 )             39.4                         12.1   9.05  )-----------( 350      ( 07 Feb 2020 13:59 )             40.2     02-10    139  |  101  |  22  ----------------------------<  112<H>  4.4   |  25  |  0.98  02-09    136  |  99  |  16  ----------------------------<  120<H>  3.9   |  23  |  0.86  02-08    136  |  97<L>  |  17  ----------------------------<  112<H>  3.6   |  24  |  0.83  02-07    138  |  101  |  14  ----------------------------<  105<H>  3.7   |  25  |  0.85    Ca    9.6      10 Feb 2020 05:05  Ca    9.4      09 Feb 2020 06:00  Mg     2.1     02-10  Mg     2.1     02-09    TPro  7.8  /  Alb  4.0  /  TBili  0.2  /  DBili  x   /  AST  10  /  ALT  9   /  AlkPhos  111  02-07    CAPILLARY BLOOD GLUCOSE          < from: CT Chest No Cont (02.07.20 @ 20:20) >    VISUALIZED UPPER ABDOMEN: Left adrenal gland is thickened. The right adrenal gland is thickened. Arising from the lateral limb of the right adrenal gland is a 6 x 6 mm nodule. Arising from the lateral limb of the left adrenal gland is a 14 x 10 mm nodule. Hepatic steatosis. Diverticulosis.    BONES: Degenerative change of the spine.    IMPRESSION:     1.  Clear lungs.  2.  Hepatic steatosis.  3.  Bilateral adrenal gland thickening and nodules. The nodules likely represent adenomas.                  ARIANE PICKARD M.D., RADIOLOGY RESIDENT  This document has been electronically signed.  SEAN TOLBERT M.D., ATTENDING RADIOLOGIST  This document has been electronically signed. Feb 8 2020  1:28PM    < end of copied text >        Serum Pro-Brain Natriuretic Peptide: 411.1 pg/mL (02-07 @ 15:27)  Serum Pro-Brain Natriuretic Peptide: 440.5 pg/mL (02-07 @ 13:59)        RECENT CULTURES:        RESPIRATORY CULTURES:          Studies  Chest X-RAY  CT SCAN Chest   Venous Dopplers: LE:   CT Abdomen  Others

## 2020-02-10 NOTE — PROGRESS NOTE ADULT - ATTENDING COMMENTS
CT CHEST IS CLEAR: HAS ADRENAL NODULES: PER ENDO / pm d  2/10: stable pulm wise: cont BD as wellas cpap at night time

## 2020-02-10 NOTE — PROVIDER CONTACT NOTE (OTHER) - ACTION/TREATMENT ORDERED:
obtain an ECG, Provider will come to bedside to examine patient.
Continue to monitor on tele. Treat chest pain with prescribed IV tylenol.
Recommend continue to monitor on telemetry and monitor signs and symptoms. Patient educated to notify RN with any symptoms. RN to notify for any change in status.

## 2020-02-11 LAB
ANION GAP SERPL CALC-SCNC: 15 MMO/L — HIGH (ref 7–14)
BUN SERPL-MCNC: 22 MG/DL — SIGNIFICANT CHANGE UP (ref 7–23)
CALCIUM SERPL-MCNC: 9.5 MG/DL — SIGNIFICANT CHANGE UP (ref 8.4–10.5)
CHLORIDE SERPL-SCNC: 97 MMOL/L — LOW (ref 98–107)
CO2 SERPL-SCNC: 25 MMOL/L — SIGNIFICANT CHANGE UP (ref 22–31)
CORTIS SERPL-MCNC: 8.6 UG/DL — SIGNIFICANT CHANGE UP (ref 2.7–18.4)
CREAT ?TM UR-MCNC: 170.6 MG/DL — SIGNIFICANT CHANGE UP
CREAT SERPL-MCNC: 0.86 MG/DL — SIGNIFICANT CHANGE UP (ref 0.5–1.3)
GLUCOSE SERPL-MCNC: 115 MG/DL — HIGH (ref 70–99)
MAGNESIUM SERPL-MCNC: 2 MG/DL — SIGNIFICANT CHANGE UP (ref 1.6–2.6)
OSMOLALITY UR: 636 MOSMO/KG — SIGNIFICANT CHANGE UP (ref 50–1200)
POTASSIUM SERPL-MCNC: 4.1 MMOL/L — SIGNIFICANT CHANGE UP (ref 3.5–5.3)
POTASSIUM SERPL-SCNC: 4.1 MMOL/L — SIGNIFICANT CHANGE UP (ref 3.5–5.3)
POTASSIUM UR-SCNC: 89 MMOL/L — SIGNIFICANT CHANGE UP
SODIUM SERPL-SCNC: 137 MMOL/L — SIGNIFICANT CHANGE UP (ref 135–145)
SODIUM UR-SCNC: 26 MMOL/L — SIGNIFICANT CHANGE UP
TSH SERPL-MCNC: 1.5 UIU/ML — SIGNIFICANT CHANGE UP (ref 0.27–4.2)

## 2020-02-11 PROCEDURE — 93016 CV STRESS TEST SUPVJ ONLY: CPT | Mod: GC

## 2020-02-11 PROCEDURE — 78452 HT MUSCLE IMAGE SPECT MULT: CPT | Mod: 26

## 2020-02-11 PROCEDURE — 93018 CV STRESS TEST I&R ONLY: CPT | Mod: GC

## 2020-02-11 RX ORDER — LIDOCAINE 4 G/100G
1 CREAM TOPICAL DAILY
Refills: 0 | Status: DISCONTINUED | OUTPATIENT
Start: 2020-02-11 | End: 2020-02-12

## 2020-02-11 RX ADMIN — Medication 100 MILLIGRAM(S): at 21:00

## 2020-02-11 RX ADMIN — LIDOCAINE 1 PATCH: 4 CREAM TOPICAL at 19:00

## 2020-02-11 RX ADMIN — ROSUVASTATIN CALCIUM 40 MILLIGRAM(S): 5 TABLET ORAL at 21:00

## 2020-02-11 RX ADMIN — Medication 650 MILLIGRAM(S): at 11:15

## 2020-02-11 RX ADMIN — Medication 1 MILLIGRAM(S): at 11:15

## 2020-02-11 RX ADMIN — AMLODIPINE BESYLATE 10 MILLIGRAM(S): 2.5 TABLET ORAL at 11:16

## 2020-02-11 RX ADMIN — GLYCOPYRROLATE AND FORMOTEROL FUMARATE 2 PUFF(S): 9; 4.8 AEROSOL, METERED RESPIRATORY (INHALATION) at 21:00

## 2020-02-11 RX ADMIN — Medication 40 MILLIGRAM(S): at 11:15

## 2020-02-11 RX ADMIN — Medication 650 MILLIGRAM(S): at 12:10

## 2020-02-11 RX ADMIN — LIDOCAINE 1 PATCH: 4 CREAM TOPICAL at 15:55

## 2020-02-11 RX ADMIN — GLYCOPYRROLATE AND FORMOTEROL FUMARATE 2 PUFF(S): 9; 4.8 AEROSOL, METERED RESPIRATORY (INHALATION) at 08:01

## 2020-02-11 RX ADMIN — Medication 81 MILLIGRAM(S): at 11:15

## 2020-02-11 RX ADMIN — PREGABALIN 1000 MICROGRAM(S): 225 CAPSULE ORAL at 11:15

## 2020-02-11 RX ADMIN — GABAPENTIN 400 MILLIGRAM(S): 400 CAPSULE ORAL at 17:23

## 2020-02-11 RX ADMIN — Medication 40 MILLIGRAM(S): at 05:00

## 2020-02-11 RX ADMIN — LOSARTAN POTASSIUM 25 MILLIGRAM(S): 100 TABLET, FILM COATED ORAL at 05:03

## 2020-02-11 RX ADMIN — GABAPENTIN 400 MILLIGRAM(S): 400 CAPSULE ORAL at 05:01

## 2020-02-11 RX ADMIN — ENOXAPARIN SODIUM 40 MILLIGRAM(S): 100 INJECTION SUBCUTANEOUS at 11:16

## 2020-02-11 NOTE — PROGRESS NOTE ADULT - SUBJECTIVE AND OBJECTIVE BOX
Jorge Garcia MD (Nephrology progress note)  [id number], [facility],  [address],  [facility]  TEl: [phone]  Cell: [phone]    Patient is a 59y Female seen and evaluated at bedside. Vital signs, laboratory data, imaging studies, consult notes reviewed. Patient lying in bed in no acute distress offers no complains. Interval stable renal function and electrolytes with stable vital signs. Patient remains on IV lasix with net negative fluid balance of 600cc with 1.7 L urine output.    Allergies    No Known Allergies    Intolerances        ROS:  CONSTITUTIONAL: No fever or chills.  HEENT: No headache, visual disturbances.  RESPIRATORY: Mild Shortness of breath but denies cough, hemoptysis or wheezing  CARDIOVASCULAR: Mild SOB but denies Chest pain,  palpitations, dizziness, syncope, orthopnea, PND or leg swelling.  GASTROINTESTINAL: No abdominal pain, nausea, vomiting, diarrhea, hematemesis or blood per rectum.  GENITOURINARY: urinary frequency  NEUROLOGICAL: No headache, focal limb weakness, tingling or numbness or seizure like activity  SKIN: No skin rash or lesion  MUSCULOSKELETAL: No leg pain, calf pain or swelling    VITALS:    T(C): 36.7 (20 @ 09:45), Max: 37.1 (02-10-20 @ 17:10)  HR: 74 (20 @ 09:45) (46 - 74)  BP: 141/86 (20 @ 09:45) (130/57 - 168/92)  RR: 16 (20 @ 09:45) (16 - 18)  SpO2: 98% (20 @ 09:45) (97% - 100%)  CAPILLARY BLOOD GLUCOSE          02-10-20 @ 07:01  -  20 @ 07:00  --------------------------------------------------------  IN: 1120 mL / OUT: 1675 mL / NET: -555 mL      MEDICATIONS  (STANDING):  amLODIPine   Tablet 10 milliGRAM(s) Oral daily  aspirin enteric coated 81 milliGRAM(s) Oral daily  cyanocobalamin 1000 MICROGram(s) Oral daily  enoxaparin Injectable 40 milliGRAM(s) SubCutaneous daily  FLUoxetine 40 milliGRAM(s) Oral daily  folic acid 1 milliGRAM(s) Oral daily  furosemide   Injectable 40 milliGRAM(s) IV Push daily  gabapentin 400 milliGRAM(s) Oral two times a day  glycopyrrolate 9 MICROgram(s)/formoterol 4.8 MICROgram(s) Inhaler 2 Puff(s) Inhalation two times a day  losartan 25 milliGRAM(s) Oral daily  rosuvastatin 40 milliGRAM(s) Oral at bedtime    MEDICATIONS  (PRN):  acetaminophen   Tablet .. 650 milliGRAM(s) Oral every 6 hours PRN Temp greater or equal to 38C (100.4F), Mild Pain (1 - 3), Moderate Pain (4 - 6), Severe Pain (7 - 10)  traZODone 100 milliGRAM(s) Oral at bedtime PRN insomnia      PHYSICAL EXAM:  General: alert awake and oriented x3 in no distress  HEENT: TANISHA, EOMI, neck supple, no JVP, no carotid bruit, no palpable thyromegaly or lymphadenopathy, oral mucosa moist and pink.  CHEST/LUNG: Bilateral clear breath sounds, no rales, no crepitations, no rhonchi, no wheezing  HEART: Regular rate and rhythm, ELHAM II/VI at LPSB, no gallops, no rub   ABDOMEN: Soft, nontender, non distended, bowel sounds present, no palpable organomegaly, no guarding, no rigidity, no rebound  : No flank or supra pubic tenderness.  EXTREMITIES: Peripheral pulses are palpable, no pedal edema  Neurology: AAOx3, no focal neurological deficit  SKIN: No rash or skin lesion      LABS:                        12.8   10.21 )-----------( 370      ( 10 Feb 2020 05:05 )             41.5     02-11    137  |  97<L>  |  22  ----------------------------<  115<H>  4.1   |  25  |  0.86    Ca    9.5      2020 06:38  Mg     2.0     02-11          Urinalysis Basic - ( 10 Feb 2020 16:40 )    Color: LIGHT YELLOW / Appearance: CLEAR / S.027 / pH: 6.5  Gluc: NEGATIVE / Ketone: NEGATIVE  / Bili: NEGATIVE / Urobili: TRACE   Blood: NEGATIVE / Protein: 10 / Nitrite: NEGATIVE   Leuk Esterase: NEGATIVE / RBC: x / WBC x   Sq Epi: x / Non Sq Epi: x / Bacteria: x    RADIOLOGY & ADDITIONAL STUDIES:  None    Imaging Personally Reviewed:  [x] YES  [ ] NO    Consultant(s) Notes Reviewed:  [x] YES  [ ] NO    Care Discussed with Primary team/ Other Providers [x] YES  [ ] NO

## 2020-02-11 NOTE — PROGRESS NOTE ADULT - PROBLEM SELECTOR PLAN 2
Patient with hypertensive urgency with long standing hypertension with bilateral incidental adrenal nodules  - Plasma renin activity, Serum aldosterone level pending  - CT chest with out contrast reviewed  - Urine electrolytes as per ordered  - Work up for secondary hypertension as per ordered.

## 2020-02-11 NOTE — PROGRESS NOTE ADULT - SUBJECTIVE AND OBJECTIVE BOX
INTERVAL HPI/OVERNIGHT EVENTS: No new concerns.   Vital Signs Last 24 Hrs  T(C): 36.4 (2020 11:), Max: 37.1 (10 Feb 2020 21:35)  T(F): 97.5 (:), Max: 98.8 (10 Feb 2020 21:35)  HR: 70 (2020 14:58) (46 - 74)  BP: 147/77 (:) (133/84 - 168/92)  BP(mean): --  RR: 17 (:13) (16 - 18)  SpO2: 99% (:) (97% - 99%)  I&O's Summary    10 Feb 2020 07:  -  2020 07:00  --------------------------------------------------------  IN: 1120 mL / OUT: 1675 mL / NET: -555 mL    2020 07:01  -  2020 18:53  --------------------------------------------------------  IN: 850 mL / OUT: 1050 mL / NET: -200 mL      MEDICATIONS  (STANDING):  amLODIPine   Tablet 10 milliGRAM(s) Oral daily  aspirin enteric coated 81 milliGRAM(s) Oral daily  cyanocobalamin 1000 MICROGram(s) Oral daily  enoxaparin Injectable 40 milliGRAM(s) SubCutaneous daily  FLUoxetine 40 milliGRAM(s) Oral daily  folic acid 1 milliGRAM(s) Oral daily  furosemide   Injectable 40 milliGRAM(s) IV Push daily  gabapentin 400 milliGRAM(s) Oral two times a day  glycopyrrolate 9 MICROgram(s)/formoterol 4.8 MICROgram(s) Inhaler 2 Puff(s) Inhalation two times a day  lidocaine   Patch 1 Patch Transdermal daily  losartan 25 milliGRAM(s) Oral daily  rosuvastatin 40 milliGRAM(s) Oral at bedtime    MEDICATIONS  (PRN):  acetaminophen   Tablet .. 650 milliGRAM(s) Oral every 6 hours PRN Temp greater or equal to 38C (100.4F), Mild Pain (1 - 3), Moderate Pain (4 - 6), Severe Pain (7 - 10)  traZODone 100 milliGRAM(s) Oral at bedtime PRN insomnia    LABS:                        12.8   10.21 )-----------( 370      ( 10 Feb 2020 05:05 )             41.5     0211    137  |  97<L>  |  22  ----------------------------<  115<H>  4.1   |  25  |  0.86    Ca    9.5      2020 06:38  Mg     2.0     0211        Urinalysis Basic - ( 10 Feb 2020 16:40 )    Color: LIGHT YELLOW / Appearance: CLEAR / S.027 / pH: 6.5  Gluc: NEGATIVE / Ketone: NEGATIVE  / Bili: NEGATIVE / Urobili: TRACE   Blood: NEGATIVE / Protein: 10 / Nitrite: NEGATIVE   Leuk Esterase: NEGATIVE / RBC: x / WBC x   Sq Epi: x / Non Sq Epi: x / Bacteria: x      CAPILLARY BLOOD GLUCOSE            Urinalysis Basic - ( 10 Feb 2020 16:40 )    Color: LIGHT YELLOW / Appearance: CLEAR / S.027 / pH: 6.5  Gluc: NEGATIVE / Ketone: NEGATIVE  / Bili: NEGATIVE / Urobili: TRACE   Blood: NEGATIVE / Protein: 10 / Nitrite: NEGATIVE   Leuk Esterase: NEGATIVE / RBC: x / WBC x   Sq Epi: x / Non Sq Epi: x / Bacteria: x      REVIEW OF SYSTEMS:  CONSTITUTIONAL: No fever, weight loss, or fatigue  EYES: No eye pain, visual disturbances, or discharge  ENMT:  No difficulty hearing, tinnitus, vertigo; No sinus or throat pain  NECK: No pain or stiffness  RESPIRATORY: No cough, wheezing, chills or hemoptysis; No shortness of breath  CARDIOVASCULAR: No chest pain, palpitations, dizziness, or leg swelling  GASTROINTESTINAL: No abdominal or epigastric pain. No nausea, vomiting, or hematemesis; No diarrhea or constipation. No melena or hematochezia.  GENITOURINARY: No dysuria, frequency, hematuria, or incontinence  NEUROLOGICAL: No headaches, memory loss, loss of strength, numbness, or tremors      Consultant(s) Notes Reviewed:  [x ] YES  [ ] NO    PHYSICAL EXAM:  GENERAL: NAD, well-groomed, well-developed, not in any distress ,  HEAD:  Atraumatic, Normocephalic  EYES: EOMI, PERRLA, conjunctiva and sclera clear  ENMT: No tonsillar erythema, exudates, or enlargement; Moist mucous membranes, Good dentition, No lesions  NECK: Supple, No JVD, Normal thyroid  NERVOUS SYSTEM:  Alert & Oriented X3, No focal deficit   CHEST/LUNG: Good air entry bilateral with no  rales, rhonchi, wheezing, or rubs  HEART: Regular rate and rhythm; No murmurs, rubs, or gallops  ABDOMEN: Soft, Nontender, Nondistended; Bowel sounds present  EXTREMITIES:  2+ Peripheral Pulses, No clubbing, cyanosis, or edema    Care Discussed with Consultants/Other Providers [ x] YES  [ ] NO

## 2020-02-11 NOTE — PROGRESS NOTE ADULT - PROBLEM SELECTOR PLAN 1
Patient with hypertensive urgency and fluid overload likely Essential hypertension and/or secondary hypertensive disorders with incidental adrenal nodules  - Low salt diet  - Weight reduction  - Monitor vital signs  - Echocardiogram to assess LVEF/wall motion abnormalities results reviewed.  - Continue amlodipine 10mg po daily  - Continue losartan 25 mg po daily  - Bilateral renal ultrasound with doppler study to assess RAAS.  - Work up for secondary hypertension in progress  - Cortisol 8.5  - TSH with in nl limits

## 2020-02-11 NOTE — PROGRESS NOTE ADULT - PROBLEM SELECTOR PLAN 4
Acute diastolic CHF with volume overload and hypertensive urgency  - Low salt diet  - strict I/o  - Daily weight  - Fluid restriction to <1 to 1.2L/day  - Optimal BP control with po medications  - Started on losartan 25 mg po daily  - Can resume bystolic with holding parameters  - Ischemia work up per cardiology team.

## 2020-02-11 NOTE — PROGRESS NOTE ADULT - PROBLEM SELECTOR PLAN 1
per cards.  2/9 defer  2/10: resolved: s/p echo ; followup report  2/11: doing ok : s/p stress testing: has mild diastolic dysfunction!

## 2020-02-11 NOTE — PROGRESS NOTE ADULT - SUBJECTIVE AND OBJECTIVE BOX
S: no chest pain or sob;  Review of systems otherwise (-)  	      acetaminophen   Tablet .. 650 milliGRAM(s) Oral every 6 hours PRN  amLODIPine   Tablet 10 milliGRAM(s) Oral daily  aspirin enteric coated 81 milliGRAM(s) Oral daily  cyanocobalamin 1000 MICROGram(s) Oral daily  enoxaparin Injectable 40 milliGRAM(s) SubCutaneous daily  FLUoxetine 40 milliGRAM(s) Oral daily  folic acid 1 milliGRAM(s) Oral daily  furosemide   Injectable 40 milliGRAM(s) IV Push daily  gabapentin 400 milliGRAM(s) Oral two times a day  glycopyrrolate 9 MICROgram(s)/formoterol 4.8 MICROgram(s) Inhaler 2 Puff(s) Inhalation two times a day  losartan 25 milliGRAM(s) Oral daily  rosuvastatin 40 milliGRAM(s) Oral at bedtime  traZODone 100 milliGRAM(s) Oral at bedtime PRN                          12.8   10.21 )-----------( 370      ( 10 Feb 2020 05:05 )             41.5       Hemoglobin: 12.8 g/dL (02-10 @ 05:05)  Hemoglobin: 12.6 g/dL (02-09 @ 06:00)  Hemoglobin: 12.1 g/dL (02-08 @ 07:00)  Hemoglobin: 12.1 g/dL (02-07 @ 13:59)      02-11    137  |  97<L>  |  22  ----------------------------<  115<H>  4.1   |  25  |  0.86    Ca    9.5      11 Feb 2020 06:38  Mg     2.0     02-11      Creatinine Trend: 0.86<--, 0.98<--, 0.86<--, 0.83<--, 0.85<--    COAGS:       PHYSICAL EXAM:  Vital Signs last 24 Hours   T(C): 36.4 (02-11-20 @ 11:13), Max: 37.1 (02-10-20 @ 17:10)  HR: 65 (02-11-20 @ 11:13) (46 - 74)  BP: 147/77 (02-11-20 @ 11:13) (130/57 - 168/92)  RR: 17 (02-11-20 @ 11:13) (16 - 18)  SpO2: 99% (02-11-20 @ 11:13) (97% - 100%)  Wt(kg): --    I&O's Summary    10 Feb 2020 07:01  -  11 Feb 2020 07:00  --------------------------------------------------------  IN: 1120 mL / OUT: 1675 mL / NET: -555 mL    11 Feb 2020 07:01  -  11 Feb 2020 11:50  --------------------------------------------------------  IN: 300 mL / OUT: 350 mL / NET: -50 mL        HEENT:  (-)icterus (-)pallor  CV: N S1 S2 1/6 ELHAM (+)2 Pulses B/l  Resp:  Clear to auscultation B/L, normal effort  GI: (+) BS Soft, NT, ND  Lymph:  (-)Edema, (-)obvious lymphadenopathy  Skin: Warm to touch, Normal turgor  Psych: Appropriate mood and affect      TELEMETRY: 	SB/SR    < from: CT Chest No Cont (02.07.20 @ 20:20) >    IMPRESSION:     1.  Clear lungs.  2.  Hepatic steatosis.  3.  Bilateral adrenal gland thickening and nodules. The nodules likely represent adenomas.    < end of copied text >      TTE: < from: Transthoracic Echocardiogram (02.10.20 @ 09:59) >  CONCLUSIONS:  1. Mitral annular calcification, otherwise normal mitral  valve.Minimal mitral regurgitation.  2. Normal left ventricular internal dimensions and wall  thicknesses.  3. Normal left ventricular systolic function. No segmental  wall motion abnormalities.  4. Mild diastolic dysfunction (Stage I).  5. Normal right ventricular size and function.    < end of copied text >      ASSESSMENT/PLAN: 	    58 y/o F w/ PMHx of HTN, HLD, borderline DM, COPD, former smoker, recent admit to [facility] for CHF, who was seen by Matt in our office 2/6 and found with B/L pleural effusions and admitted for acute on chronic CHF    -- ACS ruled out  -- TTE with normal LV function  -- Appreciate Pulm eval, pt w/ALKA started on CPAP  -- NST eval CAD pending results  -- Renal eval noted; w/u of secondary HTN underway  -- will f/u Bilateral renal ultrasound with doppler study to assess RAAS   -- on d/c pt to follow with Dr. Gutierrez on 2/20/20 at 1 pm at [facility] office [id number]  [address] [place] [address], [place] [id number], [phone]

## 2020-02-11 NOTE — PROGRESS NOTE ADULT - ATTENDING COMMENTS
CT CHEST IS CLEAR: HAS ADRENAL NODULES: PER ENDO / pm d  2/10: stable pulm wise: cont BD as wellas cpap at night time  2/11: stable:pulm wise

## 2020-02-11 NOTE — PROGRESS NOTE ADULT - ATTENDING COMMENTS
Patient seen and examined.  Agree with above.   NST with no ischemia  No further inpatient cardiac workup needed at this time.   Endocrine consult (Dr. Ma) called for adrenal adenoma workup   Follow up renal and endocrine workup    Joaquin Stoddard MD

## 2020-02-11 NOTE — PROGRESS NOTE ADULT - PROBLEM SELECTOR PLAN 2
she has copd: but I don't think she is having copd exacerbation: her ct chest to me is clear: official report is pending: she has no pleural effusions too: follow official report: cont bd  2/9 alleviated with current management. Continue current management. Outpatient follow up  2/10: she says she feels better with cpap: but not obviously SOB at this time and nor she is wheezing  2/11: hasmild diastolic dysfucntion: defer to cards

## 2020-02-11 NOTE — PROGRESS NOTE ADULT - SUBJECTIVE AND OBJECTIVE BOX
Patient is a 59y old  Female who presents with a chief complaint of SOB (2020 10:19)      Any change in ROS: Doingok :     MEDICATIONS  (STANDING):  amLODIPine   Tablet 10 milliGRAM(s) Oral daily  aspirin enteric coated 81 milliGRAM(s) Oral daily  cyanocobalamin 1000 MICROGram(s) Oral daily  enoxaparin Injectable 40 milliGRAM(s) SubCutaneous daily  FLUoxetine 40 milliGRAM(s) Oral daily  folic acid 1 milliGRAM(s) Oral daily  furosemide   Injectable 40 milliGRAM(s) IV Push daily  gabapentin 400 milliGRAM(s) Oral two times a day  glycopyrrolate 9 MICROgram(s)/formoterol 4.8 MICROgram(s) Inhaler 2 Puff(s) Inhalation two times a day  losartan 25 milliGRAM(s) Oral daily  rosuvastatin 40 milliGRAM(s) Oral at bedtime    MEDICATIONS  (PRN):  acetaminophen   Tablet .. 650 milliGRAM(s) Oral every 6 hours PRN Temp greater or equal to 38C (100.4F), Mild Pain (1 - 3), Moderate Pain (4 - 6), Severe Pain (7 - 10)  traZODone 100 milliGRAM(s) Oral at bedtime PRN insomnia    Vital Signs Last 24 Hrs  T(C): 36.4 (2020 11:13), Max: 37.1 (10 Feb 2020 17:10)  T(F): 97.5 (2020 11:13), Max: 98.8 (10 Feb 2020 21:35)  HR: 65 (:) (46 - 74)  BP: 147/77 (:13) (130/57 - 168/92)  BP(mean): --  RR: 17 (2020 11:13) (16 - 18)  SpO2: 99% (2020 11:13) (97% - 100%)    I&O's Summary    10 Feb 2020 07:01  -  2020 07:00  --------------------------------------------------------  IN: 1120 mL / OUT: 1675 mL / NET: -555 mL    2020 07:01  -  2020 11:33  --------------------------------------------------------  IN: 300 mL / OUT: 350 mL / NET: -50 mL          Physical Exam:   GENERAL: NAD, well-groomed, well-developed  HEENT: TANISHA/   Atraumatic, Normocephalic  ENMT: No tonsillar erythema, exudates, or enlargement; Moist mucous membranes, Good dentition, No lesions  NECK: Supple, No JVD, Normal thyroid  CHEST/LUNG: Clear to auscultaion  CVS: Regular rate and rhythm; No murmurs, rubs, or gallops  GI: : Soft, Nontender, Nondistended; Bowel sounds present  NERVOUS SYSTEM:  Alert & Oriented X3  EXTREMITIES:  2+ Peripheral Pulses, No clubbing, cyanosis, or edema  LYMPH: No lymphadenopathy noted  SKIN: No rashes or lesions  ENDOCRINOLOGY: No Thyromegaly  PSYCH: Appropriate    Labs:                              12.8   10.21 )-----------( 370      ( 10 Feb 2020 05:05 )             41.5                         12.6   9.52  )-----------( 369      ( 2020 06:00 )             41.0                         12.1   11.29 )-----------( 377      ( 2020 07:00 )             39.4                         12.1   9.05  )-----------( 350      ( 2020 13:59 )             40.2     02-11    137  |  97<L>  |  22  ----------------------------<  115<H>  4.1   |  25  |  0.86  02-10    139  |  101  |  22  ----------------------------<  112<H>  4.4   |  25  |  0.98  02-09    136  |  99  |  16  ----------------------------<  120<H>  3.9   |  23  |  0.86  02-08    136  |  97<L>  |  17  ----------------------------<  112<H>  3.6   |  24  |  0.83  02-07    138  |  101  |  14  ----------------------------<  105<H>  3.7   |  25  |  0.85    Ca    9.5      2020 06:38  Ca    9.6      10 Feb 2020 05:05  Mg     2.0     -11  Mg     2.1     02-10    TPro  7.8  /  Alb  4.0  /  TBili  0.2  /  DBili  x   /  AST  10  /  ALT  9   /  AlkPhos  111  02-07    CAPILLARY BLOOD GLUCOSE      < from: CT Chest No Cont (20 @ 20:20) >  calcifications.    HEART: Heart size is normal. No pericardial effusion. Coronary artery calcifications. Aortic valve calcifications can indicate underlying aortic valve disease.    CHEST WALL AND LOWER NECK: Within normal limits.    VISUALIZED UPPER ABDOMEN: Left adrenal gland is thickened. The right adrenal gland is thickened. Arising from the lateral limb of the right adrenal gland is a 6 x 6 mm nodule. Arising from the lateral limb of the left adrenal gland is a 14 x 10 mm nodule. Hepatic steatosis. Diverticulosis.    BONES: Degenerative change of the spine.    IMPRESSION:     1.  Clear lungs.  2.  Hepatic steatosis.  3.  Bilateral adrenal gland thickening and nodules. The nodules likely represent adenomas.                  ARIANE PICKARD M.D., RADIOLOGY RESIDENT  This document has been electronically signed.  SEAN TOLBERT M.D., ATTENDING RADIOLOGIST  This document has been electronically signed. 2020  1:28PM              < end of copied text >          Urinalysis Basic - ( 10 Feb 2020 16:40 )    Color: LIGHT YELLOW / Appearance: CLEAR / S.027 / pH: 6.5  Gluc: NEGATIVE / Ketone: NEGATIVE  / Bili: NEGATIVE / Urobili: TRACE   Blood: NEGATIVE / Protein: 10 / Nitrite: NEGATIVE   Leuk Esterase: NEGATIVE / RBC: x / WBC x   Sq Epi: x / Non Sq Epi: x / Bacteria: x            RECENT CULTURES:        RESPIRATORY CULTURES:          Studies  Chest X-RAY  CT SCAN Chest   Venous Dopplers: LE:   CT Abdomen  Others      < from: Transthoracic Echocardiogram (02.10.20 @ 09:59) >  Left Atrium: Normal left atrium.  LA volume index = 28  cc/m2.  Left Ventricle: Normal left ventricular systolic function.  No segmental wall motion abnormalities. Normal left  ventricular internal dimensions and wall thicknesses. Mild  diastolic dysfunction (Stage I).  Right Heart: Normal right atrium. Normal right ventricular  size and function. Normal tricuspid valve. Minimal  tricuspid regurgitation. Normal pulmonic valve. Minimal  pulmonic regurgitation.  Pericardium/PleuraNormal pericardium with trace pericardial  effusion. Left pleural effusion.  ------------------------------------------------------------------------  CONCLUSIONS:  1. Mitral annular calcification, otherwise normal mitral  valve.Minimal mitral regurgitation.  2. Normal left ventricular internal dimensions and wall  thicknesses.  3. Normal left ventricular systolic function. No segmental  wall motion abnormalities.  4. Mild diastolic dysfunction (Stage I).  5. Normal right ventricular size and function.  ------------------------------------------------------------------------  Confirmed on  2/10/2020 - 11:16:20 by Derrick Bazzi M.D.  ------------------------------------------------------------------------    < end of copied text >

## 2020-02-12 ENCOUNTER — TRANSCRIPTION ENCOUNTER (OUTPATIENT)
Age: 60
End: 2020-02-12

## 2020-02-12 VITALS — HEART RATE: 72 BPM | OXYGEN SATURATION: 97 %

## 2020-02-12 DIAGNOSIS — E66.01 MORBID (SEVERE) OBESITY DUE TO EXCESS CALORIES: ICD-10-CM

## 2020-02-12 LAB — ALDOST SERPL-MCNC: 8.5 NG/DL — SIGNIFICANT CHANGE UP

## 2020-02-12 PROCEDURE — 99238 HOSP IP/OBS DSCHRG MGMT 30/<: CPT

## 2020-02-12 RX ORDER — ICOSAPENT ETHYL 500 MG/1
1 CAPSULE, LIQUID FILLED ORAL
Qty: 0 | Refills: 0 | DISCHARGE

## 2020-02-12 RX ORDER — LOSARTAN POTASSIUM 100 MG/1
1 TABLET, FILM COATED ORAL
Qty: 30 | Refills: 0
Start: 2020-02-12 | End: 2020-03-12

## 2020-02-12 RX ORDER — EZETIMIBE 10 MG/1
1 TABLET ORAL
Qty: 0 | Refills: 0 | DISCHARGE

## 2020-02-12 RX ORDER — AMLODIPINE BESYLATE 2.5 MG/1
1 TABLET ORAL
Qty: 30 | Refills: 0
Start: 2020-02-12 | End: 2020-03-12

## 2020-02-12 RX ORDER — FUROSEMIDE 40 MG
1 TABLET ORAL
Qty: 30 | Refills: 0
Start: 2020-02-12 | End: 2020-03-12

## 2020-02-12 RX ORDER — NEBIVOLOL HYDROCHLORIDE 5 MG/1
1 TABLET ORAL
Qty: 0 | Refills: 0 | DISCHARGE

## 2020-02-12 RX ADMIN — LOSARTAN POTASSIUM 25 MILLIGRAM(S): 100 TABLET, FILM COATED ORAL at 05:03

## 2020-02-12 RX ADMIN — Medication 81 MILLIGRAM(S): at 12:00

## 2020-02-12 RX ADMIN — Medication 40 MILLIGRAM(S): at 12:00

## 2020-02-12 RX ADMIN — LIDOCAINE 1 PATCH: 4 CREAM TOPICAL at 12:01

## 2020-02-12 RX ADMIN — Medication 1 MILLIGRAM(S): at 12:00

## 2020-02-12 RX ADMIN — GABAPENTIN 400 MILLIGRAM(S): 400 CAPSULE ORAL at 05:03

## 2020-02-12 RX ADMIN — GLYCOPYRROLATE AND FORMOTEROL FUMARATE 2 PUFF(S): 9; 4.8 AEROSOL, METERED RESPIRATORY (INHALATION) at 08:27

## 2020-02-12 RX ADMIN — PREGABALIN 1000 MICROGRAM(S): 225 CAPSULE ORAL at 12:00

## 2020-02-12 RX ADMIN — AMLODIPINE BESYLATE 10 MILLIGRAM(S): 2.5 TABLET ORAL at 12:00

## 2020-02-12 RX ADMIN — ENOXAPARIN SODIUM 40 MILLIGRAM(S): 100 INJECTION SUBCUTANEOUS at 12:00

## 2020-02-12 RX ADMIN — GABAPENTIN 400 MILLIGRAM(S): 400 CAPSULE ORAL at 18:04

## 2020-02-12 RX ADMIN — Medication 40 MILLIGRAM(S): at 05:03

## 2020-02-12 RX ADMIN — LIDOCAINE 1 PATCH: 4 CREAM TOPICAL at 03:07

## 2020-02-12 NOTE — PROGRESS NOTE ADULT - SUBJECTIVE AND OBJECTIVE BOX
S: no chest pain or sob;  Review of systems otherwise (-)  	    MEDICATIONS  (STANDING):  amLODIPine   Tablet 10 milliGRAM(s) Oral daily  aspirin enteric coated 81 milliGRAM(s) Oral daily  cyanocobalamin 1000 MICROGram(s) Oral daily  enoxaparin Injectable 40 milliGRAM(s) SubCutaneous daily  FLUoxetine 40 milliGRAM(s) Oral daily  folic acid 1 milliGRAM(s) Oral daily  furosemide   Injectable 40 milliGRAM(s) IV Push daily  gabapentin 400 milliGRAM(s) Oral two times a day  glycopyrrolate 9 MICROgram(s)/formoterol 4.8 MICROgram(s) Inhaler 2 Puff(s) Inhalation two times a day  lidocaine   Patch 1 Patch Transdermal daily  losartan 25 milliGRAM(s) Oral daily  rosuvastatin 40 milliGRAM(s) Oral at bedtime    MEDICATIONS  (PRN):  acetaminophen   Tablet .. 650 milliGRAM(s) Oral every 6 hours PRN Temp greater or equal to 38C (100.4F), Mild Pain (1 - 3), Moderate Pain (4 - 6), Severe Pain (7 - 10)  traZODone 100 milliGRAM(s) Oral at bedtime PRN insomnia      LABS:    137  |  97<L>  |  22  ----------------------------<  115<H>  4.1   |  25  |  0.86    Ca    9.5      11 Feb 2020 06:38  Mg     2.0     02-11    Creatinine Trend: 0.86<--, 0.98<--, 0.86<--, 0.83<--, 0.85<--     PHYSICAL EXAM  Vital Signs Last 24 Hrs  T(C): 36.5 (12 Feb 2020 11:48), Max: 36.7 (11 Feb 2020 21:42)  T(F): 97.7 (12 Feb 2020 11:48), Max: 98 (11 Feb 2020 21:42)  HR: 93 (12 Feb 2020 12:08) (51 - 93)  BP: 131/72 (12 Feb 2020 11:48) (131/72 - 149/88)  RR: 17 (12 Feb 2020 11:48) (17 - 18)  SpO2: 97% (12 Feb 2020 12:08) (96% - 99%)      HEENT:  (-)icterus (-)pallor  CV: N S1 S2 1/6 ELHAM (+)2 Pulses B/l  Resp:  Clear to auscultation B/L, normal effort  GI: (+) BS Soft, NT, ND  Lymph:  (-)Edema, (-)obvious lymphadenopathy  Skin: Warm to touch, Normal turgor  Psych: Appropriate mood and affect      TELEMETRY: 	SB/SR    < from: CT Chest No Cont (02.07.20 @ 20:20) >    IMPRESSION:     1.  Clear lungs.  2.  Hepatic steatosis.  3.  Bilateral adrenal gland thickening and nodules. The nodules likely represent adenomas.    < end of copied text >      < from: Transthoracic Echocardiogram (02.10.20 @ 09:59) >  CONCLUSIONS:  1. Mitral annular calcification, otherwise normal mitral  valve.Minimal mitral regurgitation.  2. Normal left ventricular internal dimensions and wall  thicknesses.  3. Normal left ventricular systolic function. No segmental  wall motion abnormalities.  4. Mild diastolic dysfunction (Stage I).  5. Normal right ventricular size and function.    < end of copied text >      ASSESSMENT/PLAN: 	    58 y/o F w/ PMHx of HTN, HLD, borderline DM, COPD, former smoker, recent admit to [facility] for CHF, who was seen by Matt in our office 2/6 and found with B/L pleural effusions and admitted for acute on chronic CHF    -- ACS ruled out  -- TTE with normal LV function  -- Appreciate Pulm eval, pt w/ALKA started on CPAP  -- NST with no ischemia or infarct  -- Renal eval noted; w/u of secondary HTN as OP  --Endo eval appreciated, OP w/u of adrenal noduled.  F/U with Dr CARROLL Ma  -- on d/c pt to follow with Dr. Gutierrez on 2/20/20 at 1 pm at [place] office [address]  [address] [place] [address], [place] [address], [phone]

## 2020-02-12 NOTE — PROGRESS NOTE ADULT - ATTENDING COMMENTS
Patient seen and examined.  Agree with above.   No further inpatient cardiac workup needed at this time.   Outpatient workup of adrenal adenoma per renal and endo  DC home    Joaquin Stoddard MD

## 2020-02-12 NOTE — PROGRESS NOTE ADULT - PROBLEM SELECTOR PLAN 1
per cards.  2/9 defer  2/10: resolved: s/p echo ; followup report  2/11: doing ok : s/p stress testing: has mild diastolic dysfunction!  2/12: doingok :

## 2020-02-12 NOTE — PROGRESS NOTE ADULT - PROBLEM SELECTOR PLAN 4
Acute diastolic CHF with volume overload and hypertensive urgency  - Low salt diet  - strict I/o  - Daily weight  - Fluid restriction to <1 to 1.2L/day  - Optimal BP control with po medications  - Started on losartan 25 mg po daily  - Can resume bystolic with holding parameters  - Ischemia work up negative

## 2020-02-12 NOTE — CONSULT NOTE ADULT - ASSESSMENT
Assessment  Adrenal nodules: 59y Female with incidental finding of bilateral adrenal nodules and thickening, she denies any hot flashes abdominal pain, no workup for adrenal activity done.  HLD: On statin, stable.  HTN: Controlled,  on antihypertensive medications.  Overweight/Obesity: No strict exercise routines, not on any weight loss program, neither on low calorie diet.          Katrina Ma MD  Cell: [phone]  Office: [phone]

## 2020-02-12 NOTE — PROGRESS NOTE ADULT - PROBLEM SELECTOR PLAN 1
Patient with hypertensive urgency and fluid overload likely Essential hypertension and/or secondary hypertensive disorders with incidental adrenal nodules  - Low salt diet  - Weight reduction  - Monitor vital signs  - Echocardiogram results reviewed.  - Continue amlodipine 10mg po daily  - Continue losartan 25 mg po daily  - Bilateral renal ultrasound with doppler study resutls reviewed with poor visualization  - Work up for secondary hypertension in progress can be followed as out patient  - Cortisol 8.5  - TSH with in nl limits Patient with hypertensive urgency and fluid overload likely Essential hypertension and/or secondary hypertensive disorders with incidental adrenal nodules  - Low salt diet  - Weight reduction  - Monitor vital signs  - Echocardiogram results reviewed.  - Continue amlodipine 10mg po daily  - Continue losartan 25 mg po daily  - Can switch IV lasix to po HCTZ 25 mg po daily  - Bilateral renal ultrasound with doppler study results reviewed with poor visualization  - Work up for secondary hypertension in progress can be followed as out patient  - Cortisol 8.5  - TSH with in nl limits

## 2020-02-12 NOTE — DISCHARGE NOTE PROVIDER - NSDCCPCAREPLAN_GEN_ALL_CORE_FT
PRINCIPAL DISCHARGE DIAGNOSIS  Diagnosis: Adrenal nodule  Assessment and Plan of Treatment: Hypertensive urgency with Adrenal Adenoma. Follow up with Endocrinology, Dr. Ma and Nephrology outpatient within 1 week of discharge      SECONDARY DISCHARGE DIAGNOSES  Diagnosis: Acute diastolic congestive heart failure  Assessment and Plan of Treatment: Stress Test with no ischemia or infarct. Patient to follow up with Dr. Gutierrez on 2/20/20 at 1 [place] [address], [phone]. Continue aspirin, do not stop unless instructed by your physician.  Continue low salt, fat, cholesterol and carbohydrate diet. Follow up with cardiologist and primary care physician's routine appointment.      Diagnosis: Dyspnea  Assessment and Plan of Treatment: COPD exacerbation. CPAP at home. Follow up with Pulmonary outpatient within 1 week of discharge. Smoking cessation, continue current medications as prescribed. Follow up with your routine physician's appointments.

## 2020-02-12 NOTE — DISCHARGE NOTE NURSING/CASE MANAGEMENT/SOCIAL WORK - NSDCPEEMAIL_GEN_ALL_CORE
[facility] for Tobacco Control email [email or web address]

## 2020-02-12 NOTE — DISCHARGE NOTE NURSING/CASE MANAGEMENT/SOCIAL WORK - NSDCPEWEB_GEN_ALL_CORE
[place] website --- [email or web address] Tobacco [email or web address] website --- [email or web address]

## 2020-02-12 NOTE — CONSULT NOTE ADULT - PROBLEM SELECTOR RECOMMENDATION 9
Patient need adrenal nodule workup, can be done as out patient if patient has to be DC home, discussed with patient, she will FU in office soon.

## 2020-02-12 NOTE — PROGRESS NOTE ADULT - PROBLEM SELECTOR PLAN 2
Patient with hypertensive urgency with long standing hypertension with bilateral incidental adrenal nodules  - Plasma renin activity, Serum aldosterone level pending  - CT chest with out contrast reviewed  - Urine electrolytes results reviewed  - Work up for secondary hypertension in progress. Can be followed out patient. Patient with hypertensive urgency with long standing hypertension with bilateral incidental adrenal nodules  - Plasma renin activity pending  -Serum aldosterone level 8.5  - CT chest with out contrast reviewed  - Urine electrolytes results reviewed  - Work up for secondary hypertension in progress. Can be followed out patient.

## 2020-02-12 NOTE — DISCHARGE NOTE PROVIDER - PROVIDER TOKENS
FREE:[LAST:[Dr. Gutierrez],PHONE:[(   )    -],FAX:[(   )    -],ADDRESS:[Patient to follow up with Dr. Gutierrez on 2/20/20 at 1 pm at [place] [id number], [phone]]],PROVIDER:[TOKEN:[[id number]]] PROVIDER:[TOKEN:[[id number]]],FREE:[LAST:[Dr. Gutierrez],PHONE:[(   )    -],FAX:[(   )    -],ADDRESS:[Patient to follow up with Dr. Gutierrez on 2/20/20 at 1 [place] [address], [phone]]],FREE:[LAST:[Nephrology],PHONE:[(   )    -],FAX:[(   )    -],ADDRESS:[Follow up with Nephrology within 1 week of discharge]]

## 2020-02-12 NOTE — DISCHARGE NOTE PROVIDER - NSDCMRMEDTOKEN_GEN_ALL_CORE_FT
amLODIPine 10 mg oral tablet: 1 tab(s) orally once a day  aspirin 81 mg oral tablet: 1 tab(s) orally once a day  Bevespi Aerosphere 9 mcg-4.8 mcg/inh inhalation aerosol: 2 puff(s) inhaled once a day  Crestor 40 mg oral tablet: 1 tab(s) orally once a day  folic acid 1 mg oral tablet: 1 tab(s) orally once a day  gabapentin 400 mg oral tablet: 1 tab(s) orally 2 times a day  Lasix 40 mg oral tablet: 1 tab(s) orally once a day   losartan 25 mg oral tablet: 1 tab(s) orally once a day  PROzac 40 mg oral capsule: 1 cap(s) orally once a day  traZODone 100 mg oral tablet: 1 tab(s) orally once a day (at bedtime), As Needed  Vitamin B-12 1000 mcg oral tablet: 1 tab(s) orally once a day

## 2020-02-12 NOTE — PROGRESS NOTE ADULT - PROBLEM SELECTOR PLAN 2
she has copd: but I don't think she is having copd exacerbation: her ct chest to me is clear: official report is pending: she has no pleural effusions too: follow official report: cont bd  2/9 alleviated with current management. Continue current management. Outpatient follow up  2/10: she says she feels better with cpap: but not obviously SOB at this time and nor she is wheezing  2/11: hasmild diastolic dysfucntion: defer to cards  2/12: resolved

## 2020-02-12 NOTE — DISCHARGE NOTE PROVIDER - HOSPITAL COURSE
60 YO F w/ PMHx of HTN, HLD, borderline DM, COPD, former smoker, recent admit to [facility] for CHF, who was seen by Matt in our office 2/6 and found with B/L pleural effusions and admitted for acute on chronic CHF        # Hypertensive urgency with Adrenal Adenoma:     -Outpatient Follow up      -Sodium restriction.     -Follow up with Endocrinology, Dr. Ma and renal outpatient within 1 week of discharge         # ACS:     -TTE: with normal LV function    -NST with no ischemia or infarct    -Patient to follow up with Dr. Gutierrez on 2/20/20 at 1 pm at [place] office [address] [place] [address], [place] [address], [phone]        # Dyspnea:     -COPD exacerbation     -CPAP at home     -Resolved     -Follow up with Pulmonary outpatient within 1 week of discharge         2/12: Patient stable for discharge and medications reviewed as per  Cardiology Team

## 2020-02-12 NOTE — PROGRESS NOTE ADULT - ATTENDING COMMENTS
CT CHEST IS CLEAR: HAS ADRENAL NODULES: PER ENDO / pm d  2/10: stable pulm wise: cont BD as wellas cpap at night time  2/11: stable:pulm wise  2/12: for dc today

## 2020-02-12 NOTE — DISCHARGE NOTE PROVIDER - NSDCFUSCHEDAPPT_GEN_ALL_CORE_FT
JOSE IVORY ; 03/09/2020 ; [phone] [facility] JOSE IVORY ; 03/09/2020 ; [facility] [phone] [facility]

## 2020-02-12 NOTE — DISCHARGE NOTE NURSING/CASE MANAGEMENT/SOCIAL WORK - PATIENT PORTAL LINK FT
You can access the FollowMyHealth Patient Portal offered by [facility] by registering at the following website: [email or web address]. By joining [employer]’s FollowMyHealth portal, you will also be able to view your health information using other applications (apps) compatible with our system.

## 2020-02-12 NOTE — PROGRESS NOTE ADULT - SUBJECTIVE AND OBJECTIVE BOX
Patient is a 59y old  Female who presents with a chief complaint of SOB (2020 13:30)      Any change in ROS: Doingok : no SOB    MEDICATIONS  (STANDING):  amLODIPine   Tablet 10 milliGRAM(s) Oral daily  aspirin enteric coated 81 milliGRAM(s) Oral daily  cyanocobalamin 1000 MICROGram(s) Oral daily  enoxaparin Injectable 40 milliGRAM(s) SubCutaneous daily  FLUoxetine 40 milliGRAM(s) Oral daily  folic acid 1 milliGRAM(s) Oral daily  furosemide   Injectable 40 milliGRAM(s) IV Push daily  gabapentin 400 milliGRAM(s) Oral two times a day  glycopyrrolate 9 MICROgram(s)/formoterol 4.8 MICROgram(s) Inhaler 2 Puff(s) Inhalation two times a day  lidocaine   Patch 1 Patch Transdermal daily  losartan 25 milliGRAM(s) Oral daily  rosuvastatin 40 milliGRAM(s) Oral at bedtime    MEDICATIONS  (PRN):  acetaminophen   Tablet .. 650 milliGRAM(s) Oral every 6 hours PRN Temp greater or equal to 38C (100.4F), Mild Pain (1 - 3), Moderate Pain (4 - 6), Severe Pain (7 - 10)  traZODone 100 milliGRAM(s) Oral at bedtime PRN insomnia    Vital Signs Last 24 Hrs  T(C): 36.5 (2020 11:48), Max: 36.7 (2020 21:42)  T(F): 97.7 (2020 11:48), Max: 98 (2020 21:42)  HR: 93 (2020 12:08) (51 - 93)  BP: 131/72 (2020 11:48) (131/72 - 149/88)  BP(mean): --  RR: 17 (2020 11:48) (17 - 18)  SpO2: 97% (2020 12:08) (96% - 99%)    I&O's Summary    2020 07:01  -  2020 07:00  --------------------------------------------------------  IN: 1250 mL / OUT: 1400 mL / NET: -150 mL    2020 07:01  -  2020 14:33  --------------------------------------------------------  IN: 400 mL / OUT: 300 mL / NET: 100 mL          Physical Exam:   GENERAL: NAD, well-groomed, well-developed  HEENT: TANISHA/   Atraumatic, Normocephalic  ENMT: No tonsillar erythema, exudates, or enlargement; Moist mucous membranes, Good dentition, No lesions  NECK: Supple, No JVD, Normal thyroid  CHEST/LUNG: Clear to auscultaion  CVS: Regular rate and rhythm; No murmurs, rubs, or gallops  GI: : Soft, Nontender, Nondistended; Bowel sounds present  NERVOUS SYSTEM:  Alert & Oriented X3  EXTREMITIES:  2+ Peripheral Pulses, No clubbing, cyanosis, or edema  LYMPH: No lymphadenopathy noted  SKIN: -rash  ENDOCRINOLOGY: No Thyromegaly  PSYCH: Appropriate    Labs:                              12.8   10.21 )-----------( 370      ( 10 Feb 2020 05:05 )             41.5                         12.6   9.52  )-----------( 369      ( 2020 06:00 )             41.0     02-11    137  |  97<L>  |  22  ----------------------------<  115<H>  4.1   |  25  |  0.86  02-10    139  |  101  |  22  ----------------------------<  112<H>  4.4   |  25  |  0.98  02-09    136  |  99  |  16  ----------------------------<  120<H>  3.9   |  23  |  0.86    Ca    9.5      2020 06:38  Mg     2.0     02-11      CAPILLARY BLOOD GLUCOSE              Urinalysis Basic - ( 10 Feb 2020 16:40 )    Color: LIGHT YELLOW / Appearance: CLEAR / S.027 / pH: 6.5  Gluc: NEGATIVE / Ketone: NEGATIVE  / Bili: NEGATIVE / Urobili: TRACE   Blood: NEGATIVE / Protein: 10 / Nitrite: NEGATIVE   Leuk Esterase: NEGATIVE / RBC: x / WBC x   Sq Epi: x / Non Sq Epi: x / Bacteria: x    < from: CT Chest No Cont (20 @ 20:20) >  FINDINGS:    LUNGS AND AIRWAYS: Patent central airways.  Lungs are clear.    PLEURA: No pleural effusion. No pneumothorax.    MEDIASTINUM AND GONZÁLEZ: No lymphadenopathy.    VESSELS: The aorta is normal in caliber. The descending thoracic aorta is tortuous. The pulmonary artery is normal in caliber. Aortic calcifications.    HEART: Heart size is normal. No pericardial effusion. Coronary artery calcifications. Aortic valve calcifications can indicate underlying aortic valve disease.    CHEST WALL AND LOWER NECK: Within normal limits.    VISUALIZED UPPER ABDOMEN: Left adrenal gland is thickened. The right adrenal gland is thickened. Arising from the lateral limb of the right adrenal gland is a 6 x 6 mm nodule. Arising from the lateral limb of the left adrenal gland is a 14 x 10 mm nodule. Hepatic steatosis. Diverticulosis.    BONES: Degenerative change of the spine.    IMPRESSION:     1.  Clear lungs.  2.  Hepatic steatosis.  3.  Bilateral adrenal gland thickening and nodules. The nodules likely represent adenomas.                  ARIANE PICKARD M.D., RADIOLOGY RESIDENT  This document has been electronically signed.  SEAN TOLBERT M.D., ATTENDING RADIOLOGIST  This document has been electronically signed. 2020  1:28PM              < end of copied text >      < from: CT Chest No Cont (20 @ 20:20) >  CHEST WALL AND LOWER NECK: Within normal limits.    VISUALIZED UPPER ABDOMEN: Left adrenal gland is thickened. The right adrenal gland is thickened. Arising from the lateral limb of the right adrenal gland is a 6 x 6 mm nodule. Arising from the lateral limb of the left adrenal gland is a 14 x 10 mm nodule. Hepatic steatosis. Diverticulosis.    BONES: Degenerative change of the spine.    IMPRESSION:     1.  Clear lungs.  2.  Hepatic steatosis.  3.  Bilateral adrenal gland thickening and nodules. The nodules likely represent adenomas.      < end of copied text >      RECENT CULTURES:        RESPIRATORY CULTURES:          Studies  Chest X-RAY  CT SCAN Chest   Venous Dopplers: LE:   CT Abdomen  Others

## 2020-02-12 NOTE — PROGRESS NOTE ADULT - SUBJECTIVE AND OBJECTIVE BOX
INTERVAL HPI/OVERNIGHT EVENTS: No new concerns.   Vital Signs Last 24 Hrs  T(C): 36.5 (2020 11:48), Max: 36.7 (2020 21:42)  T(F): 97.7 (2020 11:48), Max: 98 (2020 21:42)  HR: 93 (2020 12:08) (51 - 93)  BP: 131/72 (2020 11:48) (131/72 - 149/88)  BP(mean): --  RR: 17 (2020 11:48) (17 - 18)  SpO2: 97% (2020 12:08) (96% - 99%)  I&O's Summary    2020 07:01  -  2020 07:00  --------------------------------------------------------  IN: 1250 mL / OUT: 1400 mL / NET: -150 mL    2020 07:01  -  2020 13:30  --------------------------------------------------------  IN: 400 mL / OUT: 300 mL / NET: 100 mL      MEDICATIONS  (STANDING):  amLODIPine   Tablet 10 milliGRAM(s) Oral daily  aspirin enteric coated 81 milliGRAM(s) Oral daily  cyanocobalamin 1000 MICROGram(s) Oral daily  enoxaparin Injectable 40 milliGRAM(s) SubCutaneous daily  FLUoxetine 40 milliGRAM(s) Oral daily  folic acid 1 milliGRAM(s) Oral daily  furosemide   Injectable 40 milliGRAM(s) IV Push daily  gabapentin 400 milliGRAM(s) Oral two times a day  glycopyrrolate 9 MICROgram(s)/formoterol 4.8 MICROgram(s) Inhaler 2 Puff(s) Inhalation two times a day  lidocaine   Patch 1 Patch Transdermal daily  losartan 25 milliGRAM(s) Oral daily  rosuvastatin 40 milliGRAM(s) Oral at bedtime    MEDICATIONS  (PRN):  acetaminophen   Tablet .. 650 milliGRAM(s) Oral every 6 hours PRN Temp greater or equal to 38C (100.4F), Mild Pain (1 - 3), Moderate Pain (4 - 6), Severe Pain (7 - 10)  traZODone 100 milliGRAM(s) Oral at bedtime PRN insomnia    LABS:        137  |  97<L>  |  22  ----------------------------<  115<H>  4.1   |  25  |  0.86    Ca    9.5      2020 06:38  Mg     2.0             Urinalysis Basic - ( 10 Feb 2020 16:40 )    Color: LIGHT YELLOW / Appearance: CLEAR / S.027 / pH: 6.5  Gluc: NEGATIVE / Ketone: NEGATIVE  / Bili: NEGATIVE / Urobili: TRACE   Blood: NEGATIVE / Protein: 10 / Nitrite: NEGATIVE   Leuk Esterase: NEGATIVE / RBC: x / WBC x   Sq Epi: x / Non Sq Epi: x / Bacteria: x      CAPILLARY BLOOD GLUCOSE            Urinalysis Basic - ( 10 Feb 2020 16:40 )    Color: LIGHT YELLOW / Appearance: CLEAR / S.027 / pH: 6.5  Gluc: NEGATIVE / Ketone: NEGATIVE  / Bili: NEGATIVE / Urobili: TRACE   Blood: NEGATIVE / Protein: 10 / Nitrite: NEGATIVE   Leuk Esterase: NEGATIVE / RBC: x / WBC x   Sq Epi: x / Non Sq Epi: x / Bacteria: x      REVIEW OF SYSTEMS:  CONSTITUTIONAL: No fever, weight loss, or fatigue  EYES: No eye pain, visual disturbances, or discharge  ENMT:  No difficulty hearing, tinnitus, vertigo; No sinus or throat pain  NECK: No pain or stiffness  BREASTS: No pain, masses, or nipple discharge  RESPIRATORY: No cough, wheezing, chills or hemoptysis; No shortness of breath  CARDIOVASCULAR: No chest pain, palpitations, dizziness, or leg swelling  GASTROINTESTINAL: No abdominal or epigastric pain. No nausea, vomiting, or hematemesis; No diarrhea or constipation. No melena or hematochezia.  GENITOURINARY: No dysuria, frequency, hematuria, or incontinence  NEUROLOGICAL: No headaches, memory loss, loss of strength, numbness, or tremors      Consultant(s) Notes Reviewed:  [x ] YES  [ ] NO    PHYSICAL EXAM:  GENERAL: NAD, well-groomed, well-developed, not in any distress ,  HEAD:  Atraumatic, Normocephalic  EYES: EOMI, PERRLA, conjunctiva and sclera clear  ENMT: No tonsillar erythema, exudates, or enlargement; Moist mucous membranes, Good dentition, No lesions  NECK: Supple, No JVD, Normal thyroid  NERVOUS SYSTEM:  Alert & Oriented X3, No focal deficit   CHEST/LUNG: Good air entry bilateral with no  rales, rhonchi, wheezing, or rubs  HEART: Regular rate and rhythm; No murmurs, rubs, or gallops  ABDOMEN: Soft, Nontender, Nondistended; Bowel sounds present  EXTREMITIES:  2+ Peripheral Pulses, No clubbing, cyanosis, or edema    Care Discussed with Consultants/Other Providers [ x] YES  [ ] NO

## 2020-02-12 NOTE — PROGRESS NOTE ADULT - SUBJECTIVE AND OBJECTIVE BOX
Jorge Garcia MD (Nephrology progress note)  [id number], [facility],  [id number],  [facility]  TEl: [phone]  Cell: [phone]    Patient is a 59y Female seen and evaluated at bedside. Vital signs, laboratory data, imaging studies, consult notes reviewed. Overnight events noted. Blood pressure controlled. K level 4.1. Last 24 hours I/o with 1.4 L urine output    Allergies    No Known Allergies    Intolerances        ROS:  CONSTITUTIONAL: No fever or chills.  HEENT: No headache, visual disturbances  RESPIRATORY: No shortness of breath, cough, hemoptysis or wheezing  CARDIOVASCULAR: No Chest pain, shortness of breath, palpitations, dizziness, syncope, orthopnea, PND or leg swelling.  GASTROINTESTINAL: No abdominal pain, nausea, vomiting, diarrhea, hematemesis or blood per rectum.  GENITOURINARY: No urinary frequency, urgency, gross hematuria, dysuria, foamy urine, flank or supra pubic pain, difficulty passing urine.  NEUROLOGICAL: No headache, focal limb weakness, tingling or numbness or seizure like activity  SKIN: No skin rash or lesion  MUSCULOSKELETAL: No leg pain, calf pain or swelling    VITALS:    T(C): 36.6 (20 @ 08:25), Max: 36.7 (20 @ 21:42)  HR: 56 (20 @ 08:25) (51 - 73)  BP: 139/61 (20 @ 08:25) (136/71 - 149/88)  RR: 17 (20 08:25) (17 - 18)  SpO2: 98% (20 @ 08:25) (97% - 99%)  CAPILLARY BLOOD GLUCOSE          20 @ 07:01  -  20 @ 07:00  --------------------------------------------------------  IN: 1250 mL / OUT: 1400 mL / NET: -150 mL    20 @ 07:01  -  20 @ 11:39  --------------------------------------------------------  IN: 200 mL / OUT: 300 mL / NET: -100 mL      MEDICATIONS  (STANDING):  amLODIPine   Tablet 10 milliGRAM(s) Oral daily  aspirin enteric coated 81 milliGRAM(s) Oral daily  cyanocobalamin 1000 MICROGram(s) Oral daily  enoxaparin Injectable 40 milliGRAM(s) SubCutaneous daily  FLUoxetine 40 milliGRAM(s) Oral daily  folic acid 1 milliGRAM(s) Oral daily  furosemide   Injectable 40 milliGRAM(s) IV Push daily  gabapentin 400 milliGRAM(s) Oral two times a day  glycopyrrolate 9 MICROgram(s)/formoterol 4.8 MICROgram(s) Inhaler 2 Puff(s) Inhalation two times a day  lidocaine   Patch 1 Patch Transdermal daily  losartan 25 milliGRAM(s) Oral daily  rosuvastatin 40 milliGRAM(s) Oral at bedtime    MEDICATIONS  (PRN):  acetaminophen   Tablet .. 650 milliGRAM(s) Oral every 6 hours PRN Temp greater or equal to 38C (100.4F), Mild Pain (1 - 3), Moderate Pain (4 - 6), Severe Pain (7 - 10)  traZODone 100 milliGRAM(s) Oral at bedtime PRN insomnia      PHYSICAL EXAM:  General: alert awake and oriented x3 in no distress  HEENT: TANISHA, EOMI, neck supple, no JVP, no carotid bruit, no palpable thyromegaly or lymphadenopathy, oral mucosa moist and pink.  CHEST/LUNG: Bilateral clear breath sounds, no rales, no crepitations, no rhonchi, no wheezing  HEART: Regular rate and rhythm, ELHAM II/VI at LPSB, no gallops, no rub   ABDOMEN: Soft, nontender, non distended, bowel sounds present, no palpable organomegaly, no guarding, no rigidity, no rebound  : No flank or supra pubic tenderness.  EXTREMITIES: Peripheral pulses are palpable, no pedal edema  Neurology: AAOx3, no focal neurological deficit  SKIN: No rash or skin lesion      LABS:        137  |  97<L>  |  22  ----------------------------<  115<H>  4.1   |  25  |  0.86    Ca    9.5      2020 06:38  Mg     2.0               Urinalysis Basic - ( 10 Feb 2020 16:40 )    Color: LIGHT YELLOW / Appearance: CLEAR / S.027 / pH: 6.5  Gluc: NEGATIVE / Ketone: NEGATIVE  / Bili: NEGATIVE / Urobili: TRACE   Blood: NEGATIVE / Protein: 10 / Nitrite: NEGATIVE   Leuk Esterase: NEGATIVE / RBC: x / WBC x   Sq Epi: x / Non Sq Epi: x / Bacteria: x      Sodium, Random Urine: 26 mmol/L ( @ 13:33)  Osmolality, Random Urine: 636 mosmo/kg ( @ 13:33)  Potassium, Random Urine: 89.0 mmol/L ( @ 13:33)  Creatinine, Random Urine: 170.60 mg/dL ( @ 13:33)        RADIOLOGY & ADDITIONAL STUDIES:  None    Imaging Personally Reviewed:  [x] YES  [ ] NO    Consultant(s) Notes Reviewed:  [x] YES  [ ] NO    Care Discussed with Primary team/ Other Providers [x] YES  [ ] NO

## 2020-02-12 NOTE — DISCHARGE NOTE PROVIDER - CARE PROVIDER_API CALL
Dr. Gutierrez,   Patient to follow up with Dr. Gutierrez on 2/20/20 at 1 pm at [place] office [address] [place], [place] [id number], [phone]  Phone: (   )    -  Fax: (   )    -  Follow Up Time:     Katrina Ma)  EndocrinologyMetabDiabetes; Internal Medicine  [address] [place] [address]  Phone: [phone]  Fax: [phone]  Follow Up Time: Katrina Ma)  EndocrinologyMetabDiabetes; Internal Medicine  [address] [place] [address]  Phone: [phone]  Fax: [phone]  Follow Up Time:     Dr. Gutierrez,   Patient to follow up with Dr. Gutierrez on 2/20/20 at 1 pm at [place] [id number], [phone]  Phone: (   )    -  Fax: (   )    -  Follow Up Time:     Nephrology,   Follow up with Nephrology within 1 week of discharge  Phone: (   )    -  Fax: (   )    -  Follow Up Time:

## 2020-02-12 NOTE — CONSULT NOTE ADULT - SUBJECTIVE AND OBJECTIVE BOX
HPI:  60 y/o F w/ PMHx of HTN, HLD, COPD, former smoker (quit 2 yrs ago; 35 pack yrs), presenting with chest pain and dyspnea. Patient states chest pain and dyspnea started 1.5 weeks ago. Chest pain feels like a squeezing sensation around her heart; denies any radiation of pain. States chest pain is constant but is exacerbated with exertion. SOB is also exacerbated by exertion. Can walk about 1/2 block before getting SOB and experiencing worsening chest pain. States she has also been having lower extremity edema which started 2 weeks ago. Now LE edema is more improved but denies taking any diuretics. Is unable to lay flat as gets significantly dyspneic. States she went O[place] ([place] [facility]) due to the worsening LE edema about 2 weeks ago. Per patient was only hospitalized 1 day and was told to follow up outpatient with cardiologist. States she went to see her cardiologist (Dr. Gutierrez) yesterday 2/6 at which time he did an echo; patient was advised to go to ED for admission as was found to have pleural effusions on echo. Patient denies fever, chills, significant wt loss/gain, sputum production, palpitations, abdominal pain, N/V/D, dysuria, recent travel, or sick contacts. (07 Feb 2020 17:07)  Patient has no history of adrenal nodules, denies hot flashes, has HTN, denies abdominal pian.  PAST MEDICAL & SURGICAL HISTORY:  H/O chronic bronchitis  Bursitis  Insomnia  Stress incontinence, female  Cataract  Right retinal detachment  Carpal tunnel syndrome on left  Ovarian cyst  Rotator cuff tear, left  Migraine aura without headache  Carpal tunnel syndrome, bilateral  TIA (transient ischemic attack): with slurred speach and blurred vision , possible migraines, pt under neurology care  Depression  Sleep apnea  Hiatal hernia  Anxiety  Hyperlipemia  Morbidly obese  HTN (hypertension)  History of carpal tunnel surgery of left wrist: 2/2017  RD (retinal detachment), right  Status post rotator cuff repair: Left rotator 7/2016  H/O nasal septoplasty: 2014  S/P hernia repair: incarcerated hernia with mesh- May 2015  History of total knee replacement: 2011  History of shoulder surgery: left shoulder arthroscopy- 2010  History of hysterectomy: 1994      FAMILY HISTORY:  Family history of hypertension  Diabetes mellitus, type 2  Family history of brain cancer      Social History:    Outpatient Medications:    MEDICATIONS  (STANDING):  amLODIPine   Tablet 10 milliGRAM(s) Oral daily  aspirin enteric coated 81 milliGRAM(s) Oral daily  cyanocobalamin 1000 MICROGram(s) Oral daily  enoxaparin Injectable 40 milliGRAM(s) SubCutaneous daily  FLUoxetine 40 milliGRAM(s) Oral daily  folic acid 1 milliGRAM(s) Oral daily  furosemide   Injectable 40 milliGRAM(s) IV Push daily  gabapentin 400 milliGRAM(s) Oral two times a day  glycopyrrolate 9 MICROgram(s)/formoterol 4.8 MICROgram(s) Inhaler 2 Puff(s) Inhalation two times a day  lidocaine   Patch 1 Patch Transdermal daily  losartan 25 milliGRAM(s) Oral daily  rosuvastatin 40 milliGRAM(s) Oral at bedtime    MEDICATIONS  (PRN):  acetaminophen   Tablet .. 650 milliGRAM(s) Oral every 6 hours PRN Temp greater or equal to 38C (100.4F), Mild Pain (1 - 3), Moderate Pain (4 - 6), Severe Pain (7 - 10)  traZODone 100 milliGRAM(s) Oral at bedtime PRN insomnia      Allergies    No Known Allergies    Intolerances      Review of Systems:  Constitutional: No fever, no chills  Eyes: No blurry vision  Neuro: No tremors  HEENT: No pain, no neck swelling  Cardiovascular: No chest pain, no palpitations  Respiratory: No SOB, no cough  GI: No nausea, vomiting, abdominal pain  : No dysuria  Skin: no rash  MSK: Has leg swelling.  Psych: no depression  Endocrine: no polyuria, polydipsia    UNABLE TO OBTAIN    ALL OTHER SYSTEMS REVIEWED AND NEGATIVE        PHYSICAL EXAM:  VITALS: T(C): 36.6 (02-12-20 @ 08:25)  T(F): 97.9 (02-12-20 @ 08:25), Max: 98 (02-11-20 @ 21:42)  HR: 56 (02-12-20 @ 08:25) (51 - 73)  BP: 139/61 (02-12-20 @ 08:25) (136/71 - 149/88)  RR:  (17 - 18)  SpO2:  (97% - 99%)  Wt(kg): --  GENERAL: NAD, well-groomed, well-developed  EYES: No proptosis, no lid lag  HEENT:  Atraumatic, Normocephalic  THYROID: Normal size, no palpable nodules  RESPIRATORY: Clear to auscultation bilaterally; No rales, rhonchi, wheezing  CARDIOVASCULAR: Si S2, No murmurs;  GI: Soft, non distended, normal bowel sounds  SKIN: Dry, intact, No rashes or lesions  MUSCULOSKELETAL: Has BL lower extremity edema.  NEURO:  no tremor, sensation decreased in feet BL,                              12.8   10.21 )-----------( 370      ( 10 Feb 2020 05:05 )             41.5       02-11    137  |  97<L>  |  22  ----------------------------<  115<H>  4.1   |  25  |  0.86    EGFR if : 85  EGFR if non : 73    Ca    9.5      02-11  Mg     2.0     02-11        Thyroid Function Tests:  02-11 @ 06:38 TSH 1.50 FreeT4 -- T3 -- Anti TPO -- Anti Thyroglobulin Ab -- TSI --              Radiology:

## 2020-02-15 LAB — RENIN PLAS-CCNC: 0.21 NG/ML/HR — SIGNIFICANT CHANGE UP (ref 0.17–5.38)

## 2020-02-18 LAB — METANEPH SERPL-MCNC: SIGNIFICANT CHANGE UP

## 2020-03-03 ENCOUNTER — APPOINTMENT (OUTPATIENT)
Dept: PHYSICAL MEDICINE AND REHAB | Facility: CLINIC | Age: 60
End: 2020-03-03

## 2020-03-09 ENCOUNTER — APPOINTMENT (OUTPATIENT)
Dept: CARDIOLOGY | Facility: CLINIC | Age: 60
End: 2020-03-09

## 2020-07-31 ENCOUNTER — OUTPATIENT (OUTPATIENT)
Dept: OUTPATIENT SERVICES | Facility: HOSPITAL | Age: 60
LOS: 1 days | End: 2020-07-31

## 2020-07-31 VITALS
HEIGHT: 66 IN | SYSTOLIC BLOOD PRESSURE: 111 MMHG | TEMPERATURE: 99 F | RESPIRATION RATE: 18 BRPM | OXYGEN SATURATION: 97 % | HEART RATE: 51 BPM | DIASTOLIC BLOOD PRESSURE: 58 MMHG | WEIGHT: 235.01 LBS

## 2020-07-31 DIAGNOSIS — E78.5 HYPERLIPIDEMIA, UNSPECIFIED: ICD-10-CM

## 2020-07-31 DIAGNOSIS — Z98.890 OTHER SPECIFIED POSTPROCEDURAL STATES: Chronic | ICD-10-CM

## 2020-07-31 DIAGNOSIS — I10 ESSENTIAL (PRIMARY) HYPERTENSION: ICD-10-CM

## 2020-07-31 DIAGNOSIS — J44.9 CHRONIC OBSTRUCTIVE PULMONARY DISEASE, UNSPECIFIED: ICD-10-CM

## 2020-07-31 DIAGNOSIS — G47.33 OBSTRUCTIVE SLEEP APNEA (ADULT) (PEDIATRIC): ICD-10-CM

## 2020-07-31 DIAGNOSIS — Z01.818 ENCOUNTER FOR OTHER PREPROCEDURAL EXAMINATION: ICD-10-CM

## 2020-07-31 DIAGNOSIS — Z98.89 OTHER SPECIFIED POSTPROCEDURAL STATES: Chronic | ICD-10-CM

## 2020-07-31 DIAGNOSIS — F41.9 ANXIETY DISORDER, UNSPECIFIED: ICD-10-CM

## 2020-07-31 DIAGNOSIS — M19.012 PRIMARY OSTEOARTHRITIS, LEFT SHOULDER: ICD-10-CM

## 2020-07-31 DIAGNOSIS — H33.21 SEROUS RETINAL DETACHMENT, RIGHT EYE: Chronic | ICD-10-CM

## 2020-07-31 DIAGNOSIS — E11.9 TYPE 2 DIABETES MELLITUS WITHOUT COMPLICATIONS: ICD-10-CM

## 2020-07-31 RX ORDER — MUPIROCIN 20 MG/G
1 OINTMENT TOPICAL
Qty: 1 | Refills: 0
Start: 2020-07-31 | End: 2020-08-04

## 2020-07-31 RX ORDER — FOLIC ACID 0.8 MG
1 TABLET ORAL
Qty: 0 | Refills: 0 | DISCHARGE

## 2020-07-31 RX ORDER — GABAPENTIN 400 MG/1
1 CAPSULE ORAL
Qty: 0 | Refills: 0 | DISCHARGE

## 2020-07-31 RX ORDER — CHLORHEXIDINE GLUCONATE 213 G/1000ML
1 SOLUTION TOPICAL
Qty: 2 | Refills: 0
Start: 2020-07-31 | End: 2020-07-31

## 2020-07-31 NOTE — H&P PST ADULT - NEGATIVE CARDIOVASCULAR SYMPTOMS
no orthopnea/no chest pain/no peripheral edema/no claudication/no paroxysmal nocturnal dyspnea/no palpitations/no dyspnea on exertion

## 2020-07-31 NOTE — H&P PST ADULT - NEUROLOGICAL DETAILS
normal strength/responds to verbal commands/responds to pain/sensation intact/deep reflexes intact/cranial nerves intact/no spontaneous movement/alert and oriented x 3/superficial reflexes intact

## 2020-07-31 NOTE — H&P PST ADULT - NEGATIVE PSYCHIATRIC SYMPTOMS
no paranoia/no visual hallucinations/no auditory hallucinations/no suicidal ideation/no mood swings/no agitation/no hyperactivity

## 2020-07-31 NOTE — H&P PST ADULT - NSICDXPASTSURGICALHX_GEN_ALL_CORE_FT
PAST SURGICAL HISTORY:  H/O nasal septoplasty 2014    History of carpal tunnel surgery of left wrist 2/2017    History of hysterectomy 1994    History of shoulder surgery left shoulder arthroscopy- 2010    History of total knee replacement 2011 right knee    RD (retinal detachment), right     S/P hernia repair incarcerated hernia with mesh- May 2015    Status post rotator cuff repair Left rotator 7/2016

## 2020-07-31 NOTE — H&P PST ADULT - MUSCULOSKELETAL
details… detailed exam no joint swelling/no joint erythema/normal/decreased ROM due to pain/no joint warmth/no calf tenderness/normal strength

## 2020-07-31 NOTE — H&P PST ADULT - RS GEN PE MLT RESP DETAILS PC
respirations non-labored/good air movement/no rales/airway patent/no rhonchi/breath sounds equal/normal/clear to auscultation bilaterally

## 2020-07-31 NOTE — H&P PST ADULT - NSICDXPROBLEM_GEN_ALL_CORE_FT
PROBLEM DIAGNOSES  Problem: Osteoarthritis of left shoulder  Assessment and Plan: Left total shoulder replacement on 08/04/2020. Instructed pt that she  that no one will be allowed to come to hospital with her, to notify security when she arrives in the lobby of the hospital on the day of the surgery in order to be assessed by nursing staff and to be escorted to the presurgical area, not to eat or drink anything after midnight the night before the surgery, to avoid NSAIDS such as Ibuprofen, Motrin, Aleve, Advil, Naproxen before surgery, to take Tylenol if needed for pain, to report if she has been exposed to any one with any contagious diseases including Covid-19 or if she is exhibiting any symptoms of COVID-19. Instructed about use of Chlorhexidine 4% soap and to use Dial soap if unable to obtain prescribed soap. Soap e-prescribed to pharmacy on file and patient informed to  soap and use as discussed. Verbalized understanding of instructions given.     Problem: Chronic obstructive pulmonary disease (COPD)  Assessment and Plan: Instructed to continue use of inhalers and use same on day of surgery. Instructed to bring inhalers to hospital on day of surgery.Follow-up with PCP postop for management     Problem: Anxiety and depression  Assessment and Plan: Instructed to continue medications and take with sips of water on day of surgery. Follow-up with provider postop for management.     Problem: HTN (hypertension)  Assessment and Plan: Instructed to continue antihypertensive meds and take with sips of water on day of surgery.  Cleared by PCP. Follow-up with PCP postop for management.     Problem: HLD (hyperlipidemia)  Assessment and Plan: Instructed pt to continue Atorvastatin. Follow-up with PCP postop for lipid management     Problem: ALKA on CPAP  Assessment and Plan: CPAP 14 cm H2O. Perioperative pulmonary precautions. Sleep study report obtained and attached to chart. PROBLEM DIAGNOSES  Problem: Osteoarthritis of left shoulder  Assessment and Plan: Left total shoulder replacement on 08/04/2020. Instructed pt that she  that no one will be allowed to come to hospital with her, to notify security when she arrives in the lobby of the hospital on the day of the surgery in order to be assessed by nursing staff and to be escorted to the presurgical area, not to eat or drink anything after midnight the night before the surgery, to avoid NSAIDs such as Ibuprofen, Motrin, Aleve, Advil, Naproxen before surgery, to take Tylenol if needed for pain, to report if she has been exposed to any one with any contagious diseases including Covid-19 or if she is exhibiting any symptoms of COVID-19. Instructed about use of Chlorhexidine 4% soap and to use Dial soap if unable to obtain prescribed soap. Soap e-prescribed to pharmacy on file and patient informed to  soap and use as discussed. Verbalized understanding of instructions given.     Problem: Chronic obstructive pulmonary disease (COPD)  Assessment and Plan: Instructed to continue use of inhalers and use same on day of surgery. Instructed to bring inhalers to hospital on day of surgery.Follow-up with PCP postop for management     Problem: Anxiety and depression  Assessment and Plan: Instructed to continue medications and take with sips of water on day of surgery. Follow-up with provider postop for management.     Problem: HTN (hypertension)  Assessment and Plan: Instructed to continue antihypertensive meds and take with sips of water on day of surgery.  Cleared by PCP. Follow-up with PCP postop for management.     Problem: HLD (hyperlipidemia)  Assessment and Plan: Instructed pt to continue Atorvastatin. Follow-up with PCP postop for lipid management     Problem: ALKA on CPAP  Assessment and Plan: CPAP 14 cm H2O. Perioperative pulmonary precautions. Sleep study report obtained and attached to chart. PROBLEM DIAGNOSES  Problem: Osteoarthritis of left shoulder  Assessment and Plan: Left total shoulder replacement on 08/04/2020. Instructed pt that she  that no one will be allowed to come to hospital with her, to notify security when she arrives in the lobby of the hospital on the day of the surgery in order to be assessed by nursing staff and to be escorted to the presurgical area, not to eat or drink anything after midnight the night before the surgery, to avoid NSAIDs such as Ibuprofen, Motrin, Aleve, Advil, Naproxen before surgery, to take Tylenol if needed for pain, to report if she has been exposed to any one with any contagious diseases including Covid-19 or if she is exhibiting any symptoms of COVID-19. Instructed about use of Chlorhexidine 4% soap and to use Dial soap if unable to obtain prescribed soap. Soap e-prescribed to pharmacy on file and patient informed to  soap and use as discussed. Verbalized understanding of instructions given.     Problem: Chronic obstructive pulmonary disease (COPD)  Assessment and Plan: Instructed to continue use of inhalers and use same on day of surgery. Instructed to bring inhalers to hospital on day of surgery. Follow-up with PCP postop for management     Problem: Anxiety and depression  Assessment and Plan: Instructed to continue medications and take with sips of water on day of surgery. Follow-up with provider postop for management.     Problem: HTN (hypertension)  Assessment and Plan: Instructed to continue antihypertensive meds and take with sips of water on day of surgery.  Cleared by PCP. Follow-up with PCP postop for management.     Problem: HLD (hyperlipidemia)  Assessment and Plan: Instructed pt to continue Atorvastatin. Follow-up with PCP postop for lipid management     Problem: ALKA on CPAP  Assessment and Plan: CPAP 14 cm H2O. Perioperative pulmonary precautions. Sleep study report obtained and attached to chart.

## 2020-07-31 NOTE — H&P PST ADULT - MUSCULOSKELETAL COMMENTS
c/o right knee pain due to c/o right knee pain due to arthritis secondary to h/o arthroscopy of knee x 3 left shoulder

## 2020-07-31 NOTE — H&P PST ADULT - NEGATIVE GASTROINTESTINAL SYMPTOMS
no flatulence/no melena/no change in bowel habits/no nausea/no vomiting/no diarrhea/no constipation/no abdominal pain

## 2020-07-31 NOTE — H&P PST ADULT - ASSESSMENT
59 yr old female with PMH of anxiety, depression, HTN, HLD, ALKA on CPAP 15 cm H2O, COPD, PSH of arthroscopy of left shoulder x 3 presents with left shoulder osteoarthritis. Pt is scheduled for left total shoulder replacement on 08/04/2020.

## 2020-07-31 NOTE — H&P PST ADULT - HISTORY OF PRESENT ILLNESS
59 yr old female with PMH of anxiety, depression, HTN, HLD, ALKA on CPAP 15 cm H2O, COPD, PSH of arthroscopy of left shoulder x 3 presents with c/o left shoulder pain due to osteoarthritis. Pt reports inability to use arm and worsening of pain with arm movement. Pt for left total shoulder replacement on 08/04/2020. 59 yr old female with PMH of anxiety, depression, HTN, HLD, ALKA on CPAP 14 cm H2O, COPD, PSH of arthroscopy of left shoulder x 3 presents with c/o left shoulder pain due to osteoarthritis. Pt reports inability to use arm and worsening of pain with arm movement. Pt for left total shoulder replacement on 08/04/2020.

## 2020-07-31 NOTE — H&P PST ADULT - NSICDXPASTMEDICALHX_GEN_ALL_CORE_FT
PAST MEDICAL HISTORY:  Anxiety     Bursitis     Carpal tunnel syndrome on left     Carpal tunnel syndrome, bilateral     Cataract     Depression     H/O chronic bronchitis     Hiatal hernia     HTN (hypertension)     Hyperlipemia     Insomnia     Migraine aura without headache     Morbidly obese     Ovarian cyst     Right retinal detachment     Rotator cuff tear, left     Sleep apnea     Stress incontinence, female     TIA (transient ischemic attack) with slurred speach and blurred vision , possible migraines, pt under neurology care PAST MEDICAL HISTORY:  Anxiety     Bursitis     Carpal tunnel syndrome on left     Carpal tunnel syndrome, bilateral     Cataract     Depression     H/O chronic bronchitis     Hiatal hernia     HTN (hypertension)     Hyperlipemia     Insomnia     Migraine aura without headache     Morbidly obese     ALKA on CPAP 14 cm H2O    Ovarian cyst     Primary osteoarthritis of left shoulder     Right retinal detachment     Rotator cuff tear, left     Sleep apnea     Stress incontinence, female     TIA (transient ischemic attack) with slurred speach and blurred vision , possible migraines, pt under neurology care

## 2020-07-31 NOTE — H&P PST ADULT - NEGATIVE NEUROLOGICAL SYMPTOMS
no paresthesias/no syncope/no generalized seizures/no focal seizures/no headache/no tremors/no vertigo/no weakness

## 2020-08-01 ENCOUNTER — APPOINTMENT (OUTPATIENT)
Dept: DISASTER EMERGENCY | Facility: CLINIC | Age: 60
End: 2020-08-01

## 2020-08-01 DIAGNOSIS — Z01.818 ENCOUNTER FOR OTHER PREPROCEDURAL EXAMINATION: ICD-10-CM

## 2020-08-02 LAB — SARS-COV-2 N GENE NPH QL NAA+PROBE: NOT DETECTED

## 2020-08-03 ENCOUNTER — TRANSCRIPTION ENCOUNTER (OUTPATIENT)
Age: 60
End: 2020-08-03

## 2020-08-04 ENCOUNTER — INPATIENT (INPATIENT)
Facility: HOSPITAL | Age: 60
LOS: 0 days | Discharge: HOME | DRG: 483 | End: 2020-08-05
Attending: ORTHOPAEDIC SURGERY | Admitting: ORTHOPAEDIC SURGERY
Payer: MEDICARE

## 2020-08-04 ENCOUNTER — RESULT REVIEW (OUTPATIENT)
Age: 60
End: 2020-08-04

## 2020-08-04 VITALS
RESPIRATION RATE: 18 BRPM | DIASTOLIC BLOOD PRESSURE: 58 MMHG | HEART RATE: 51 BPM | TEMPERATURE: 98 F | OXYGEN SATURATION: 97 % | SYSTOLIC BLOOD PRESSURE: 111 MMHG | WEIGHT: 242.95 LBS | HEIGHT: 66 IN

## 2020-08-04 DIAGNOSIS — Z98.89 OTHER SPECIFIED POSTPROCEDURAL STATES: Chronic | ICD-10-CM

## 2020-08-04 DIAGNOSIS — M19.012 PRIMARY OSTEOARTHRITIS, LEFT SHOULDER: ICD-10-CM

## 2020-08-04 DIAGNOSIS — Z98.890 OTHER SPECIFIED POSTPROCEDURAL STATES: Chronic | ICD-10-CM

## 2020-08-04 DIAGNOSIS — H33.21 SEROUS RETINAL DETACHMENT, RIGHT EYE: Chronic | ICD-10-CM

## 2020-08-04 LAB — BLD GP AB SCN SERPL QL: SIGNIFICANT CHANGE UP

## 2020-08-04 PROCEDURE — 73020 X-RAY EXAM OF SHOULDER: CPT | Mod: 26

## 2020-08-04 PROCEDURE — 88305 TISSUE EXAM BY PATHOLOGIST: CPT | Mod: 26

## 2020-08-04 PROCEDURE — 23472 RECONSTRUCT SHOULDER JOINT: CPT | Mod: AS,LT

## 2020-08-04 PROCEDURE — 23410 REPAIR ROTATOR CUFF ACUTE: CPT | Mod: AS,59,LT

## 2020-08-04 PROCEDURE — 23145 REMOVAL OF BONE LESION: CPT | Mod: AS,59,LT

## 2020-08-04 PROCEDURE — 88311 DECALCIFY TISSUE: CPT | Mod: 26

## 2020-08-04 PROCEDURE — 73030 X-RAY EXAM OF SHOULDER: CPT | Mod: 26,LT

## 2020-08-04 RX ORDER — SODIUM CHLORIDE 9 MG/ML
1000 INJECTION INTRAMUSCULAR; INTRAVENOUS; SUBCUTANEOUS
Refills: 0 | Status: DISCONTINUED | OUTPATIENT
Start: 2020-08-04 | End: 2020-08-05

## 2020-08-04 RX ORDER — ATORVASTATIN CALCIUM 80 MG/1
40 TABLET, FILM COATED ORAL AT BEDTIME
Refills: 0 | Status: DISCONTINUED | OUTPATIENT
Start: 2020-08-04 | End: 2020-08-05

## 2020-08-04 RX ORDER — ACETAMINOPHEN 500 MG
975 TABLET ORAL ONCE
Refills: 0 | Status: COMPLETED | OUTPATIENT
Start: 2020-08-04 | End: 2020-08-04

## 2020-08-04 RX ORDER — ENOXAPARIN SODIUM 100 MG/ML
30 INJECTION SUBCUTANEOUS EVERY 12 HOURS
Refills: 0 | Status: DISCONTINUED | OUTPATIENT
Start: 2020-08-05 | End: 2020-08-05

## 2020-08-04 RX ORDER — ONDANSETRON 8 MG/1
4 TABLET, FILM COATED ORAL EVERY 6 HOURS
Refills: 0 | Status: DISCONTINUED | OUTPATIENT
Start: 2020-08-04 | End: 2020-08-05

## 2020-08-04 RX ORDER — TRAMADOL HYDROCHLORIDE 50 MG/1
50 TABLET ORAL EVERY 8 HOURS
Refills: 0 | Status: DISCONTINUED | OUTPATIENT
Start: 2020-08-04 | End: 2020-08-05

## 2020-08-04 RX ORDER — SODIUM CHLORIDE 9 MG/ML
1000 INJECTION, SOLUTION INTRAVENOUS
Refills: 0 | Status: DISCONTINUED | OUTPATIENT
Start: 2020-08-04 | End: 2020-08-04

## 2020-08-04 RX ORDER — CELECOXIB 200 MG/1
200 CAPSULE ORAL ONCE
Refills: 0 | Status: COMPLETED | OUTPATIENT
Start: 2020-08-04 | End: 2020-08-04

## 2020-08-04 RX ORDER — SODIUM CHLORIDE 9 MG/ML
3 INJECTION INTRAMUSCULAR; INTRAVENOUS; SUBCUTANEOUS EVERY 8 HOURS
Refills: 0 | Status: DISCONTINUED | OUTPATIENT
Start: 2020-08-04 | End: 2020-08-04

## 2020-08-04 RX ORDER — LOSARTAN POTASSIUM 100 MG/1
25 TABLET, FILM COATED ORAL DAILY
Refills: 0 | Status: DISCONTINUED | OUTPATIENT
Start: 2020-08-04 | End: 2020-08-05

## 2020-08-04 RX ORDER — HYDROMORPHONE HYDROCHLORIDE 2 MG/ML
0.5 INJECTION INTRAMUSCULAR; INTRAVENOUS; SUBCUTANEOUS
Refills: 0 | Status: DISCONTINUED | OUTPATIENT
Start: 2020-08-04 | End: 2020-08-04

## 2020-08-04 RX ORDER — ASPIRIN/CALCIUM CARB/MAGNESIUM 324 MG
81 TABLET ORAL DAILY
Refills: 0 | Status: DISCONTINUED | OUTPATIENT
Start: 2020-08-04 | End: 2020-08-05

## 2020-08-04 RX ORDER — CEFAZOLIN SODIUM 1 G
2000 VIAL (EA) INJECTION EVERY 8 HOURS
Refills: 0 | Status: COMPLETED | OUTPATIENT
Start: 2020-08-04 | End: 2020-08-05

## 2020-08-04 RX ORDER — POVIDONE-IODINE 5 %
1 AEROSOL (ML) TOPICAL ONCE
Refills: 0 | Status: COMPLETED | OUTPATIENT
Start: 2020-08-04 | End: 2020-08-04

## 2020-08-04 RX ORDER — FLUOXETINE HCL 10 MG
40 CAPSULE ORAL DAILY
Refills: 0 | Status: DISCONTINUED | OUTPATIENT
Start: 2020-08-04 | End: 2020-08-05

## 2020-08-04 RX ORDER — GLYCOPYRROLATE AND FORMOTEROL FUMARATE 9; 4.8 UG/1; UG/1
2 AEROSOL, METERED RESPIRATORY (INHALATION)
Refills: 0 | Status: DISCONTINUED | OUTPATIENT
Start: 2020-08-04 | End: 2020-08-05

## 2020-08-04 RX ORDER — TRAZODONE HCL 50 MG
100 TABLET ORAL AT BEDTIME
Refills: 0 | Status: DISCONTINUED | OUTPATIENT
Start: 2020-08-04 | End: 2020-08-05

## 2020-08-04 RX ORDER — GABAPENTIN 400 MG/1
400 CAPSULE ORAL
Refills: 0 | Status: DISCONTINUED | OUTPATIENT
Start: 2020-08-04 | End: 2020-08-05

## 2020-08-04 RX ORDER — SENNA PLUS 8.6 MG/1
2 TABLET ORAL AT BEDTIME
Refills: 0 | Status: DISCONTINUED | OUTPATIENT
Start: 2020-08-04 | End: 2020-08-05

## 2020-08-04 RX ORDER — ONDANSETRON 8 MG/1
4 TABLET, FILM COATED ORAL ONCE
Refills: 0 | Status: DISCONTINUED | OUTPATIENT
Start: 2020-08-04 | End: 2020-08-04

## 2020-08-04 RX ORDER — FENTANYL CITRATE 50 UG/ML
25 INJECTION INTRAVENOUS
Refills: 0 | Status: DISCONTINUED | OUTPATIENT
Start: 2020-08-04 | End: 2020-08-04

## 2020-08-04 RX ORDER — CHLORHEXIDINE GLUCONATE 213 G/1000ML
1 SOLUTION TOPICAL ONCE
Refills: 0 | Status: COMPLETED | OUTPATIENT
Start: 2020-08-04 | End: 2020-08-04

## 2020-08-04 RX ORDER — KETOROLAC TROMETHAMINE 30 MG/ML
30 SYRINGE (ML) INJECTION EVERY 6 HOURS
Refills: 0 | Status: DISCONTINUED | OUTPATIENT
Start: 2020-08-04 | End: 2020-08-05

## 2020-08-04 RX ORDER — CHOLECALCIFEROL (VITAMIN D3) 125 MCG
1000 CAPSULE ORAL DAILY
Refills: 0 | Status: DISCONTINUED | OUTPATIENT
Start: 2020-08-04 | End: 2020-08-05

## 2020-08-04 RX ORDER — MAGNESIUM HYDROXIDE 400 MG/1
30 TABLET, CHEWABLE ORAL DAILY
Refills: 0 | Status: DISCONTINUED | OUTPATIENT
Start: 2020-08-04 | End: 2020-08-05

## 2020-08-04 RX ORDER — PREGABALIN 225 MG/1
1000 CAPSULE ORAL DAILY
Refills: 0 | Status: DISCONTINUED | OUTPATIENT
Start: 2020-08-04 | End: 2020-08-05

## 2020-08-04 RX ORDER — ACETAMINOPHEN 500 MG
1000 TABLET ORAL EVERY 6 HOURS
Refills: 0 | Status: DISCONTINUED | OUTPATIENT
Start: 2020-08-04 | End: 2020-08-05

## 2020-08-04 RX ORDER — POLYETHYLENE GLYCOL 3350 17 G/17G
17 POWDER, FOR SOLUTION ORAL DAILY
Refills: 0 | Status: DISCONTINUED | OUTPATIENT
Start: 2020-08-04 | End: 2020-08-05

## 2020-08-04 RX ORDER — FOLIC ACID 0.8 MG
1 TABLET ORAL DAILY
Refills: 0 | Status: DISCONTINUED | OUTPATIENT
Start: 2020-08-04 | End: 2020-08-05

## 2020-08-04 RX ORDER — OXYCODONE HYDROCHLORIDE 5 MG/1
5 TABLET ORAL EVERY 4 HOURS
Refills: 0 | Status: DISCONTINUED | OUTPATIENT
Start: 2020-08-04 | End: 2020-08-05

## 2020-08-04 RX ORDER — AMLODIPINE BESYLATE 2.5 MG/1
5 TABLET ORAL DAILY
Refills: 0 | Status: DISCONTINUED | OUTPATIENT
Start: 2020-08-04 | End: 2020-08-05

## 2020-08-04 RX ADMIN — Medication 975 MILLIGRAM(S): at 12:13

## 2020-08-04 RX ADMIN — GLYCOPYRROLATE AND FORMOTEROL FUMARATE 2 PUFF(S): 9; 4.8 AEROSOL, METERED RESPIRATORY (INHALATION) at 21:42

## 2020-08-04 RX ADMIN — TRAMADOL HYDROCHLORIDE 50 MILLIGRAM(S): 50 TABLET ORAL at 21:45

## 2020-08-04 RX ADMIN — TRAMADOL HYDROCHLORIDE 50 MILLIGRAM(S): 50 TABLET ORAL at 22:15

## 2020-08-04 RX ADMIN — Medication 100 MILLIGRAM(S): at 23:17

## 2020-08-04 RX ADMIN — Medication 1 APPLICATION(S): at 12:13

## 2020-08-04 RX ADMIN — SODIUM CHLORIDE 3 MILLILITER(S): 9 INJECTION INTRAMUSCULAR; INTRAVENOUS; SUBCUTANEOUS at 11:16

## 2020-08-04 RX ADMIN — CELECOXIB 200 MILLIGRAM(S): 200 CAPSULE ORAL at 12:13

## 2020-08-04 RX ADMIN — Medication 1000 MILLIGRAM(S): at 23:17

## 2020-08-04 RX ADMIN — ATORVASTATIN CALCIUM 40 MILLIGRAM(S): 80 TABLET, FILM COATED ORAL at 21:42

## 2020-08-04 RX ADMIN — CHLORHEXIDINE GLUCONATE 1 APPLICATION(S): 213 SOLUTION TOPICAL at 12:13

## 2020-08-04 RX ADMIN — GABAPENTIN 400 MILLIGRAM(S): 400 CAPSULE ORAL at 23:24

## 2020-08-04 RX ADMIN — Medication 100 MILLIGRAM(S): at 21:41

## 2020-08-04 RX ADMIN — Medication 1000 MILLIGRAM(S): at 23:47

## 2020-08-04 NOTE — ASU PATIENT PROFILE, ADULT - PMH
Anxiety    Bursitis    Carpal tunnel syndrome on left    Carpal tunnel syndrome, bilateral    Cataract    Depression    H/O chronic bronchitis    Hiatal hernia    HTN (hypertension)    Hyperlipemia    Insomnia    Migraine aura without headache    Morbidly obese    ALKA on CPAP  14 cm H2O  Ovarian cyst    Primary osteoarthritis of left shoulder    Right retinal detachment    Rotator cuff tear, left    Sleep apnea    Stress incontinence, female    TIA (transient ischemic attack)  with slurred speach and blurred vision , possible migraines, pt under neurology care

## 2020-08-04 NOTE — ASU PATIENT PROFILE, ADULT - PSH
H/O nasal septoplasty  2014  History of carpal tunnel surgery of left wrist  2/2017  History of hysterectomy  1994  History of shoulder surgery  left shoulder arthroscopy- 2010  History of total knee replacement  2011 right knee  RD (retinal detachment), right    S/P hernia repair  incarcerated hernia with mesh- May 2015  Status post rotator cuff repair  Left rotator 7/2016

## 2020-08-05 ENCOUNTER — TRANSCRIPTION ENCOUNTER (OUTPATIENT)
Age: 60
End: 2020-08-05

## 2020-08-05 VITALS
OXYGEN SATURATION: 96 % | RESPIRATION RATE: 18 BRPM | DIASTOLIC BLOOD PRESSURE: 62 MMHG | SYSTOLIC BLOOD PRESSURE: 134 MMHG | HEART RATE: 82 BPM | TEMPERATURE: 98 F

## 2020-08-05 DIAGNOSIS — I10 ESSENTIAL (PRIMARY) HYPERTENSION: ICD-10-CM

## 2020-08-05 DIAGNOSIS — G47.30 SLEEP APNEA, UNSPECIFIED: ICD-10-CM

## 2020-08-05 DIAGNOSIS — F41.9 ANXIETY DISORDER, UNSPECIFIED: ICD-10-CM

## 2020-08-05 DIAGNOSIS — G47.00 INSOMNIA, UNSPECIFIED: ICD-10-CM

## 2020-08-05 DIAGNOSIS — M19.012 PRIMARY OSTEOARTHRITIS, LEFT SHOULDER: ICD-10-CM

## 2020-08-05 DIAGNOSIS — M75.102 UNSPECIFIED ROTATOR CUFF TEAR OR RUPTURE OF LEFT SHOULDER, NOT SPECIFIED AS TRAUMATIC: ICD-10-CM

## 2020-08-05 DIAGNOSIS — G47.33 OBSTRUCTIVE SLEEP APNEA (ADULT) (PEDIATRIC): ICD-10-CM

## 2020-08-05 DIAGNOSIS — Z87.09 PERSONAL HISTORY OF OTHER DISEASES OF THE RESPIRATORY SYSTEM: ICD-10-CM

## 2020-08-05 DIAGNOSIS — G89.18 OTHER ACUTE POSTPROCEDURAL PAIN: ICD-10-CM

## 2020-08-05 DIAGNOSIS — M71.9 BURSOPATHY, UNSPECIFIED: ICD-10-CM

## 2020-08-05 LAB
ANION GAP SERPL CALC-SCNC: 11 MMOL/L — SIGNIFICANT CHANGE UP (ref 5–17)
BUN SERPL-MCNC: 14 MG/DL — SIGNIFICANT CHANGE UP (ref 7–18)
CALCIUM SERPL-MCNC: 7.9 MG/DL — LOW (ref 8.4–10.5)
CHLORIDE SERPL-SCNC: 105 MMOL/L — SIGNIFICANT CHANGE UP (ref 96–108)
CO2 SERPL-SCNC: 21 MMOL/L — LOW (ref 22–31)
CREAT SERPL-MCNC: 0.78 MG/DL — SIGNIFICANT CHANGE UP (ref 0.5–1.3)
GLUCOSE SERPL-MCNC: 95 MG/DL — SIGNIFICANT CHANGE UP (ref 70–99)
HCT VFR BLD CALC: 32.4 % — LOW (ref 34.5–45)
HGB BLD-MCNC: 9.8 G/DL — LOW (ref 11.5–15.5)
MCHC RBC-ENTMCNC: 26.3 PG — LOW (ref 27–34)
MCHC RBC-ENTMCNC: 30.2 GM/DL — LOW (ref 32–36)
MCV RBC AUTO: 87.1 FL — SIGNIFICANT CHANGE UP (ref 80–100)
NRBC # BLD: 0 /100 WBCS — SIGNIFICANT CHANGE UP (ref 0–0)
PLATELET # BLD AUTO: 205 K/UL — SIGNIFICANT CHANGE UP (ref 150–400)
POTASSIUM SERPL-MCNC: 4.4 MMOL/L — SIGNIFICANT CHANGE UP (ref 3.5–5.3)
POTASSIUM SERPL-SCNC: 4.4 MMOL/L — SIGNIFICANT CHANGE UP (ref 3.5–5.3)
RBC # BLD: 3.72 M/UL — LOW (ref 3.8–5.2)
RBC # FLD: 15.9 % — HIGH (ref 10.3–14.5)
SODIUM SERPL-SCNC: 137 MMOL/L — SIGNIFICANT CHANGE UP (ref 135–145)
WBC # BLD: 7.56 K/UL — SIGNIFICANT CHANGE UP (ref 3.8–10.5)
WBC # FLD AUTO: 7.56 K/UL — SIGNIFICANT CHANGE UP (ref 3.8–10.5)

## 2020-08-05 PROCEDURE — 73030 X-RAY EXAM OF SHOULDER: CPT

## 2020-08-05 PROCEDURE — 97161 PT EVAL LOW COMPLEX 20 MIN: CPT

## 2020-08-05 PROCEDURE — 85027 COMPLETE CBC AUTOMATED: CPT

## 2020-08-05 PROCEDURE — C1713: CPT

## 2020-08-05 PROCEDURE — 86900 BLOOD TYPING SEROLOGIC ABO: CPT

## 2020-08-05 PROCEDURE — 86850 RBC ANTIBODY SCREEN: CPT

## 2020-08-05 PROCEDURE — C1776: CPT

## 2020-08-05 PROCEDURE — 88305 TISSUE EXAM BY PATHOLOGIST: CPT

## 2020-08-05 PROCEDURE — 88311 DECALCIFY TISSUE: CPT

## 2020-08-05 PROCEDURE — 99221 1ST HOSP IP/OBS SF/LOW 40: CPT

## 2020-08-05 PROCEDURE — C1769: CPT

## 2020-08-05 PROCEDURE — 94640 AIRWAY INHALATION TREATMENT: CPT

## 2020-08-05 PROCEDURE — 86901 BLOOD TYPING SEROLOGIC RH(D): CPT

## 2020-08-05 PROCEDURE — 86923 COMPATIBILITY TEST ELECTRIC: CPT

## 2020-08-05 PROCEDURE — 36415 COLL VENOUS BLD VENIPUNCTURE: CPT

## 2020-08-05 PROCEDURE — 80048 BASIC METABOLIC PNL TOTAL CA: CPT

## 2020-08-05 RX ORDER — SENNA PLUS 8.6 MG/1
2 TABLET ORAL AT BEDTIME
Refills: 0 | Status: DISCONTINUED | OUTPATIENT
Start: 2020-08-05 | End: 2020-08-05

## 2020-08-05 RX ORDER — OXYCODONE AND ACETAMINOPHEN 5; 325 MG/1; MG/1
1 TABLET ORAL
Qty: 42 | Refills: 0
Start: 2020-08-05 | End: 2020-08-11

## 2020-08-05 RX ORDER — SENNA PLUS 8.6 MG/1
2 TABLET ORAL
Qty: 14 | Refills: 0
Start: 2020-08-05 | End: 2020-08-11

## 2020-08-05 RX ORDER — IBUPROFEN 200 MG
1 TABLET ORAL
Qty: 0 | Refills: 0 | DISCHARGE

## 2020-08-05 RX ORDER — GABAPENTIN 400 MG/1
400 CAPSULE ORAL EVERY 8 HOURS
Refills: 0 | Status: DISCONTINUED | OUTPATIENT
Start: 2020-08-05 | End: 2020-08-05

## 2020-08-05 RX ORDER — ASPIRIN/CALCIUM CARB/MAGNESIUM 324 MG
1 TABLET ORAL
Qty: 0 | Refills: 0 | DISCHARGE

## 2020-08-05 RX ORDER — ASPIRIN/CALCIUM CARB/MAGNESIUM 324 MG
1 TABLET ORAL
Qty: 30 | Refills: 0
Start: 2020-08-05 | End: 2020-09-03

## 2020-08-05 RX ORDER — DICLOFENAC SODIUM 30 MG/G
1 GEL TOPICAL
Qty: 0 | Refills: 0 | DISCHARGE

## 2020-08-05 RX ADMIN — AMLODIPINE BESYLATE 5 MILLIGRAM(S): 2.5 TABLET ORAL at 06:16

## 2020-08-05 RX ADMIN — Medication 40 MILLIGRAM(S): at 13:00

## 2020-08-05 RX ADMIN — LOSARTAN POTASSIUM 25 MILLIGRAM(S): 100 TABLET, FILM COATED ORAL at 06:16

## 2020-08-05 RX ADMIN — Medication 1000 MILLIGRAM(S): at 06:16

## 2020-08-05 RX ADMIN — Medication 81 MILLIGRAM(S): at 13:00

## 2020-08-05 RX ADMIN — PREGABALIN 1000 MICROGRAM(S): 225 CAPSULE ORAL at 13:01

## 2020-08-05 RX ADMIN — Medication 1000 MILLIGRAM(S): at 14:00

## 2020-08-05 RX ADMIN — TRAMADOL HYDROCHLORIDE 50 MILLIGRAM(S): 50 TABLET ORAL at 13:03

## 2020-08-05 RX ADMIN — TRAMADOL HYDROCHLORIDE 50 MILLIGRAM(S): 50 TABLET ORAL at 14:01

## 2020-08-05 RX ADMIN — GLYCOPYRROLATE AND FORMOTEROL FUMARATE 2 PUFF(S): 9; 4.8 AEROSOL, METERED RESPIRATORY (INHALATION) at 13:01

## 2020-08-05 RX ADMIN — Medication 1000 MILLIGRAM(S): at 13:01

## 2020-08-05 RX ADMIN — ENOXAPARIN SODIUM 30 MILLIGRAM(S): 100 INJECTION SUBCUTANEOUS at 08:00

## 2020-08-05 RX ADMIN — Medication 30 MILLIGRAM(S): at 03:35

## 2020-08-05 RX ADMIN — Medication 30 MILLIGRAM(S): at 04:05

## 2020-08-05 RX ADMIN — GABAPENTIN 400 MILLIGRAM(S): 400 CAPSULE ORAL at 06:16

## 2020-08-05 RX ADMIN — TRAMADOL HYDROCHLORIDE 50 MILLIGRAM(S): 50 TABLET ORAL at 06:46

## 2020-08-05 RX ADMIN — Medication 1000 UNIT(S): at 13:00

## 2020-08-05 RX ADMIN — Medication 1 MILLIGRAM(S): at 13:00

## 2020-08-05 RX ADMIN — Medication 1000 MILLIGRAM(S): at 06:46

## 2020-08-05 RX ADMIN — Medication 100 MILLIGRAM(S): at 06:16

## 2020-08-05 RX ADMIN — GABAPENTIN 400 MILLIGRAM(S): 400 CAPSULE ORAL at 13:03

## 2020-08-05 RX ADMIN — TRAMADOL HYDROCHLORIDE 50 MILLIGRAM(S): 50 TABLET ORAL at 06:16

## 2020-08-05 NOTE — CONSULT NOTE ADULT - SUBJECTIVE AND OBJECTIVE BOX
Source of information: JOSE IVORY, Chart review  Patient language: English  : n/a    HPI:      Patient is a 59y old  Female who presents with a chief complaint of osteoarthritis of left shoulder, pt is now POD #1, s/p Left total shoulder arthroplasty on 8/4. Pt seen and examined at bedside. Reports pain score 8/10, pt recently received pain medication and states her pain is subsiding. SCALE USED: (1-10 VNRS). Pt describes pain as aching, non- radiating, alleviated by pain medication, exacerbated by movement. Pt tolerating PO diet. Denies lethargy, nausea, vomiting, constipation, itchiness. Reports last BM 8/3. Patient stated goal for pain control: to be able to take deep breaths, get out of bed to chair and ambulate with tolerable pain control. Pt reports taking gabapentin once daily for pain at home. Pt also had cortisone injections in her left hip which she receives regularly every 4-6 months. Pt has tried lidocaine and Biofreeze patches with minimal relief at home.     PAST MEDICAL & SURGICAL HISTORY:  Primary osteoarthritis of left shoulder  ALKA on CPAP: 14 cm H2O  H/O chronic bronchitis  Bursitis  Insomnia  Stress incontinence, female  Cataract  Right retinal detachment  Carpal tunnel syndrome on left  Ovarian cyst  Rotator cuff tear, left  Migraine aura without headache  Carpal tunnel syndrome, bilateral  TIA (transient ischemic attack): with slurred speach and blurred vision , possible migraines, pt under neurology care  Depression  Sleep apnea  Hiatal hernia  Anxiety  Hyperlipemia  Morbidly obese  HTN (hypertension)  History of carpal tunnel surgery of left wrist: 2/2017  RD (retinal detachment), right  Status post rotator cuff repair: Left rotator 7/2016  H/O nasal septoplasty: 2014  S/P hernia repair: incarcerated hernia with mesh- May 2015  History of total knee replacement: 2011 right knee  History of shoulder surgery: left shoulder arthroscopy- 2010  History of hysterectomy: 1994      FAMILY HISTORY:  Family history of hypertension  Diabetes mellitus, type 2  Family history of brain cancer      Social History:   [X ] Denies ETOH use, illicit drug use and smoking    Allergies    No Known Allergies    MEDICATIONS  (STANDING):  acetaminophen   Tablet .. 1000 milliGRAM(s) Oral every 6 hours  amLODIPine   Tablet 5 milliGRAM(s) Oral daily  aspirin enteric coated 81 milliGRAM(s) Oral daily  atorvastatin 40 milliGRAM(s) Oral at bedtime  cholecalciferol 1000 Unit(s) Oral daily  cyanocobalamin 1000 MICROGram(s) Oral daily  enoxaparin Injectable 30 milliGRAM(s) SubCutaneous every 12 hours  FLUoxetine 40 milliGRAM(s) Oral daily  folic acid 1 milliGRAM(s) Oral daily  gabapentin 400 milliGRAM(s) Oral four times a day  glycopyrrolate 9 MICROgram(s)/formoterol 4.8 MICROgram(s) Inhaler 2 Puff(s) Inhalation two times a day  losartan 25 milliGRAM(s) Oral daily  polyethylene glycol 3350 17 Gram(s) Oral daily  sodium chloride 0.9%. 1000 milliLiter(s) (150 mL/Hr) IV Continuous <Continuous>  traMADol 50 milliGRAM(s) Oral every 8 hours  traZODone 100 milliGRAM(s) Oral at bedtime    MEDICATIONS  (PRN):  aluminum hydroxide/magnesium hydroxide/simethicone Suspension 30 milliLiter(s) Oral four times a day PRN Indigestion  ketorolac   Injectable 30 milliGRAM(s) IV Push every 6 hours PRN Severe Pain (7 - 10)  magnesium hydroxide Suspension 30 milliLiter(s) Oral daily PRN Constipation  ondansetron Injectable 4 milliGRAM(s) IV Push every 6 hours PRN Nausea and/or Vomiting  oxyCODONE    IR 5 milliGRAM(s) Oral every 4 hours PRN Moderate Pain (4 - 6)  senna 2 Tablet(s) Oral at bedtime PRN Constipation      Vital Signs Last 24 Hrs  T(C): 36.4 (05 Aug 2020 05:53), Max: 36.9 (04 Aug 2020 11:59)  T(F): 97.6 (05 Aug 2020 05:53), Max: 98.4 (04 Aug 2020 11:59)  HR: 73 (05 Aug 2020 09:44) (51 - 73)  BP: 135/58 (05 Aug 2020 09:44) (111/58 - 158/77)  BP(mean): 86 (04 Aug 2020 19:14) (82 - 99)  RR: 18 (05 Aug 2020 05:53) (12 - 20)  SpO2: 97% (05 Aug 2020 05:53) (95% - 100%)    LABS: Reviewed.                          9.8    7.56  )-----------( 205      ( 05 Aug 2020 06:36 )             32.4     08-05    137  |  105  |  14  ----------------------------<  95  4.4   |  21<L>  |  0.78    Ca    7.9<L>      05 Aug 2020 06:36      Radiology: Reviewed.   < from: Xray Shoulder 2 Views, Left (08.04.20 @ 17:43) >    EXAM:  SHOULDER LEFT (MINIMU 2V)                            PROCEDURE DATE:  08/04/2020          INTERPRETATION:  EXAM:XR SHOULDER 2 VIEWS LEFT.     CLINICAL INDICATION: Post-op left total shoulder replacement .     TECHNIQUE: Portable AP view.    PRIOR EXAM: None available.     FINDINGS:     There is evidence of a left shoulder prosthesis that appears to be in satisfactory position. No periprosthetic abnormality is seen. A soft tissue radiolucency may be artifactual or postsurgical.      IMPRESSION:    Postsurgical changes.                KAISER ARTEAGA M.D., ATTENDING RADIOLOGIST  This document has been electronically signed. Aug  5 2020  9:25AM        < end of copied text >      ORT Score -   Family Hx of substance abuse	Female	      Male  Alcohol 	                                           1                     3  Illegal drugs	                                   2                     3  Rx drugs                                           4 	                  4  Personal Hx of substance abuse		  Alcohol 	                                          3	                  3  Illegal drugs                                     4	                  4  Rx drugs                                            5 	                  5  Age between 16- 45 years	           1                     1  hx preadolescent sexual abuse	   3 	                  0  Psychological disease		  ADD, OCD, bipolar, schizophrenia   2	          2  Depression                                           1	          1  Total: 1    a score of 3 or lower indicates low risk for opioid abuse		  a score of 4-7 indicates moderate risk for opioid abuse		  a score of 8 or higher indicates high risk for opioid abuse    REVIEW OF SYSTEMS:  CONSTITUTIONAL: No fever or fatigue  RESPIRATORY: No cough, wheezing, chills or hemoptysis; No shortness of breath  CARDIOVASCULAR: No chest pain, palpitations, dizziness, or leg swelling  GASTROINTESTINAL: No abdominal or epigastric pain. No nausea, vomiting; No diarrhea or constipation.   GENITOURINARY: No dysuria, frequency, hematuria, retention or incontinence  MUSCULOSKELETAL: + left shoulder pain, No swelling. + chronic back, hip and knee pain,  no saddle anesthesia, bowel/bladder incontinence  NEURO: No numbness/ tingling in b/l LE   PSYCHIATRIC: + hx depression and anxiety. No mood swings, or difficulty sleeping    PHYSICAL EXAM:  GENERAL:  Alert & Oriented X3, NAD, Good concentration  CHEST/LUNG: Clear to auscultation bilaterally; No rales, rhonchi, wheezing, or rubs  HEART: Regular rate and rhythm; No murmurs, rubs, or gallops  ABDOMEN: Soft, Nontender, Nondistended; Bowel sounds present  EXTREMITIES:  2+ Peripheral Pulses, No cyanosis, or edema  MUSCULOSKELETAL: + left shoulder tender to palpation; left shoulder sling in place. + wiggle left hand finders. Motor Strength 5/5 right upper and b/l lower extremities; moves all extremities equally against gravity  SKIN: No rashes or lesions    Risk factors associated with adverse outcomes related to opioid treatment  [ ]  Concurrent benzodiazepine use  [ ]  History/ Active substance use or alcohol use disorder  [X ] Psychiatric co-morbidity  [X ] Sleep apnea  [ ] COPD  [X ] BMI> 35  [ ] Liver dysfunction  [ ] Renal dysfunction  [ ] CHF  [ ] Smoker  [ ]  Age > 60 years    [X ]  [place]  Reviewed and Copied to Chart. See below.    Plan of care and goal oriented pain management treatment options were discussed with patient and /or primary care giver; all questions and concerns were addressed and care was aligned with patient's wishes.    Educated patient on goal oriented pain management treatment options

## 2020-08-05 NOTE — DISCHARGE NOTE PROVIDER - CARE PROVIDER_API CALL
Evan Hernandes  ORTHOPAEDIC SURGERY  [address] [place] [address]  Phone: [phone]  Fax: [phone]  Follow Up Time:

## 2020-08-05 NOTE — CONSULT NOTE ADULT - ASSESSMENT
Confidential Drug Utilization Report  Search Terms: Sandra Palma, 1960   Search Date: 08/05/2020 10:53:59 AM   The Drug Utilization Report below displays all of the controlled substance prescriptions, if any, that your patient has filled in the last twelve months. The information displayed on this report is compiled from pharmacy submissions to the Department, and accurately reflects the information as submitted by the pharmacies.  This report was requested by: Laura Rangel | Reference #: [id number]   You have not added a DARLNIG number. Keeping your DARLING number(s) up to date on the My DARLING Numbers </doh2/applinks/cspnp/myDeaNumbers> page will enable the separation of your prescriptions from others in the search results.   Others' Prescriptions  Patient Name: Sandra Palma   YOB: 1960   Address: [address] [place] [id number]   Sex: Female   Rx Written	Rx Dispensed	Drug	Quantity	Days Supply	Prescriber Name	Payment Method	Dispenser	  12/19/2019	12/24/2019	tramadol hcl 50 mg tablet 	20	7	Evan Hernandes MD	[facility]	  12/12/2019	12/12/2019	tramadol hcl 50 mg tablet 	21	7	Evan Hernandes MD	Insurance	[facility]	  12/03/2019	12/03/2019	oxycodone-acetaminophen 5-325 mg tab 	42	7	Ankush Goins	Insurance	[facility]	  10/29/2019	10/29/2019	tramadol hcl 50 mg tablet 	20	5	Loy Reyes PA-C	Insurance	[facility]	  10/25/2019	10/25/2019	tramadol hcl 50 mg tablet 	12	3	Loy Reyes PA-C	Insurance	[facility]	  10/09/2019	10/09/2019	oxycodone hcl 5 mg tablet 	6	2	Mary Jane Josue MD	Insurance	[facility]	  Patient Name: Sandra Palma   YOB: 1960   Address: [address] [place] [id number]   Sex: Female   Rx Written	Rx Dispensed	Drug	Quantity	Days Supply	Prescriber Name	Payment Method	Dispenser	  04/13/2020	04/13/2020	promethazine-codeine syrup 	280ml	14	Isidro Chacko	[facility] 	  03/28/2020	03/28/2020	promethazine-codeine syrup 	200ml	10	Isidro Chacko	[facility] 	  Patient Name: Sandra Palma   YOB: 1960   Address: [address] [place] [address]   Sex: Female   Rx Written	Rx Dispensed	Drug	Quantity	Days Supply	Prescriber Name	Payment Method	Dispenser	  05/09/2020	05/15/2020	promethazine-codeine syrup 	200	10	Ginna, Isidro Louis	[facility]	  Patient Name: Sandra Palma   YOB: 1960   Address: [address] [place] [id number]   Sex: Female   Rx Written	Rx Dispensed	Drug	Quantity	Days Supply	Prescriber Name	Payment Method	Dispenser	  05/13/2020	05/15/2020	promethazine-codeine syrup 	150ml	7	[id number]	  * - Drugs marked with an asterisk are compound drugs. If the compound drug is made up of more than one controlled substance, then each controlled substance will be a separate row in the table.

## 2020-08-05 NOTE — DISCHARGE NOTE PROVIDER - NSDCMRMEDTOKEN_GEN_ALL_CORE_FT
aspirin 325 mg oral delayed release tablet: 1 tab(s) orally once a day   Bevespi Aerosphere 9 mcg-4.8 mcg/inh inhalation aerosol: 2 puff(s) inhaled once a day  Bystolic 10 mg oral tablet: 1 tab(s) orally once a day  Crestor 40 mg oral tablet: 1 tab(s) orally once a day  folic acid 1 mg oral tablet: 1 tab(s) orally once a day  gabapentin 400 mg oral tablet: 1 tab(s) orally 4 times a day, As Needed  losartan 25 mg oral tablet: 1 tab(s) orally once a day  Norvasc 5 mg oral tablet: 1 tab(s) orally once a day  oxycodone-acetaminophen 5 mg-325 mg oral tablet: 1 tab(s) orally every 4 hours, As Needed -for moderate pain MDD:6 tabs  PROzac 40 mg oral capsule: 1 cap(s) orally once a day  senna oral tablet: 2 tab(s) orally once a day, As Needed -for constipation   traZODone 100 mg oral tablet: 1 tab(s) orally once a day (at bedtime), As Needed  Vascepa 1 g oral capsule: 2 cap(s) orally 2 times a day  Vitamin B-12 1000 mcg oral tablet: 1 tab(s) orally once a day  Vitamin B12 100 mcg oral tablet: 1 tab(s) orally once a day  Vitamin D3 1000 intl units (25 mcg) oral capsule: 1 cap(s) orally once a day  Xiidra 5% ophthalmic solution: 1 drop(s) to each affected eye 2 times a day

## 2020-08-05 NOTE — CONSULT NOTE ADULT - SUBJECTIVE AND OBJECTIVE BOX
HISTORY OF PRESENT ILLNESS: HPI:     59 year old woman well knwon to Dr. Gutierrez, with hypertension, hypercholesterolemia, and obstructive sleep apnea, on CPAP, with prior hospitalization for diastolic heart failure with preserved LV systolic function with no significant valvular normal stress 2/20 who is now post op day 1 Shoulder surgery.  She denies CP, SOB LOC or palpitations reports some surgical site pain.    PAST MEDICAL & SURGICAL HISTORY:  Primary osteoarthritis of left shoulder  ALKA on CPAP: 14 cm H2O  H/O chronic bronchitis  Bursitis  Insomnia  Stress incontinence, female  Cataract  Right retinal detachment  Carpal tunnel syndrome on left  Ovarian cyst  Rotator cuff tear, left  Migraine aura without headache  Carpal tunnel syndrome, bilateral  TIA (transient ischemic attack): with slurred speach and blurred vision , possible migraines, pt under neurology care  Depression  Sleep apnea  Hiatal hernia  Anxiety  Hyperlipemia  Morbidly obese  HTN (hypertension)  History of carpal tunnel surgery of left wrist: 2/2017  RD (retinal detachment), right  Status post rotator cuff repair: Left rotator 7/2016  H/O nasal septoplasty: 2014  S/P hernia repair: incarcerated hernia with mesh- May 2015  History of total knee replacement: 2011 right knee  History of shoulder surgery: left shoulder arthroscopy- 2010  History of hysterectomy: 1994    HOME MEDS:    Bystolic 20 mg daily,   aspirin 81 mg daily,   rosuvastatin 40 mg QHS,  Praluent 75 mg every two weeks,  folic acid,  vitamin B12,  gabapentin,   Bevespi,   trazadone,   Prozac,   losartan 100 mg daily.       MEDICATIONS:  MEDICATIONS  (STANDING):  acetaminophen   Tablet .. 1000 milliGRAM(s) Oral every 6 hours  amLODIPine   Tablet 5 milliGRAM(s) Oral daily  aspirin enteric coated 81 milliGRAM(s) Oral daily  atorvastatin 40 milliGRAM(s) Oral at bedtime  cholecalciferol 1000 Unit(s) Oral daily  cyanocobalamin 1000 MICROGram(s) Oral daily  enoxaparin Injectable 30 milliGRAM(s) SubCutaneous every 12 hours  FLUoxetine 40 milliGRAM(s) Oral daily  folic acid 1 milliGRAM(s) Oral daily  gabapentin 400 milliGRAM(s) Oral four times a day  glycopyrrolate 9 MICROgram(s)/formoterol 4.8 MICROgram(s) Inhaler 2 Puff(s) Inhalation two times a day  losartan 25 milliGRAM(s) Oral daily  polyethylene glycol 3350 17 Gram(s) Oral daily  sodium chloride 0.9%. 1000 milliLiter(s) (150 mL/Hr) IV Continuous <Continuous>  traMADol 50 milliGRAM(s) Oral every 8 hours  traZODone 100 milliGRAM(s) Oral at bedtime      Allergies    No Known Allergies    Intolerances        FAMILY HISTORY:  Family history of hypertension  Diabetes mellitus, type 2  Family history of brain cancer    Non-contributary for premature coronary disease or sudden cardiac death    SOCIAL HISTORY:    [X ] Non-smoker  [ ] Smoker  [ ] Alcohol          REVIEW OF SYSTEMS:  [ ]chest pain  [  ]shortness of breath  [  ]palpitations  [  ]syncope  [ ]near syncope [ ]upper extremity weakness   [ ] lower extremity weakness  [  ]diplopia  [  ]altered mental status   [  ]fevers  [ ]chills [ ]nausea  [ ]vomitting  [  ]dysphagia    [ ]abdominal pain  [ ]melena  [ ]BRBPR    [  ]epistaxis  [  ]rash    [ ]lower extremity edema        [X ] All others negative	  [ ] Unable to obtain      LABS:	 	    CARDIAC MARKERS:                              9.8    7.56  )-----------( 205      ( 05 Aug 2020 06:36 )             32.4     Hb Trend: 9.8<--    08-05    137  |  105  |  14  ----------------------------<  95  4.4   |  21<L>  |  0.78    Ca    7.9<L>      05 Aug 2020 06:36      Creatinine Trend: 0.78<--            PHYSICAL EXAM:  T(C): 36.4 (08-05-20 @ 05:53), Max: 36.9 (08-04-20 @ 11:59)  HR: 73 (08-05-20 @ 09:44) (51 - 73)  BP: 135/58 (08-05-20 @ 09:44) (111/58 - 158/77)  RR: 18 (08-05-20 @ 05:53) (12 - 20)  SpO2: 97% (08-05-20 @ 05:53) (95% - 100%)  Wt(kg): --   BMI (kg/m2): 39.2 (08-04-20 @ 11:59)  I&O's Summary    04 Aug 2020 07:01  -  05 Aug 2020 07:00  --------------------------------------------------------  IN: 1725 mL / OUT: 0 mL / NET: 1725 mL        Gen: Appears well in NAD  HEENT:  (-)icterus (-)pallor  CV: N S1 S2 1/6 ELHAM (+)2 Pulses B/l  Resp:  Clear to ausculatation B/L, normal effort  GI: (+) BS Soft, NT, ND  Lymph:  (-)Edema, (-)obvious lymphadenopathy  Skin: Warm to touch, Normal turgor  Psych: Appropriate mood and affect  	      ECG:  	NOT done on this admit        ASSESSMENT/PLAN: 	59y Female  hypertension, hypercholesterolemia, and obstructive sleep apnea, on CPAP, with prior hospitalization for diastolic heart failure with preserved LV systolic function with no significant valvular normal stress 2/20 who is now s/p Shoulder surgery    - Tolerated procedure  - no clinical CHF  - PAin control  - BP is acceptable  - would resume home meds upon D/C and f/u with Dr. Gutierrez in 1-2 weeks    Puneet Nunez MD, [facility]  BEEPER [phone]

## 2020-08-05 NOTE — DISCHARGE NOTE PROVIDER - HOSPITAL COURSE
59y/oF admitted s/p left total shoulder replacement with no complications. Patient received pain management and physical therapy.

## 2020-08-05 NOTE — PROGRESS NOTE ADULT - SUBJECTIVE AND OBJECTIVE BOX
Pt Name: JOSE IVORY  MRN: [id number]    ORTHOPEDICS    Orthopedic diagnosis: s/p left total shoulder replacement pod#1    24hr/overnight events: pt reports having intermittent episodes of moderate pain overnight  59yFemaleHPI:  pain slightly better now, rated 5/10 on a pain scale  pt was able to get some sleep  mild tingling of the digits of the left hand noted  Pt denies Chest pain, SOB, dyspnea,  N/V/D, abdominal pain, syncope, or pain anywhere else.       T(C): 36.4 (08-05-20 @ 05:53), Max: 36.9 (08-04-20 @ 11:59)  HR: 57 (08-05-20 @ 05:53) (51 - 69)  BP: 158/77 (08-05-20 @ 05:53) (111/58 - 158/77)  RR: 18 (08-05-20 @ 05:53) (12 - 20)  SpO2: 97% (08-05-20 @ 05:53) (95% - 100%)    PHYSICAL EXAM:    Gen: well developed, well nourished, comfortable  Musculoskeletal:    [   ] RIGHT    [ XXX  ] LEFT       [ XXX  ] UPPER EXTREMITY   [   ] LOWER EXTREMITY  Left shoulder:  skin pink and warm throughout   dressing c/d/i (aquacel dresing)  sensation intact throughout   rad/uln/med/pin/ain nerves intact  5/5  strength  good cap refill  no atrophy noted  expected dec rom of left shoulder 2* to pain  sling intact    LABS:                        9.8    7.56  )-----------( 205      ( 05 Aug 2020 06:36 )             32.4     08-05    137  |  105  |  14  ----------------------------<  95  4.4   |  21<L>  |  0.78    Ca    7.9<L>      05 Aug 2020 06:36          IMPRESSION: Pt is a  59y Female s/p left total shoulder replacement pod#1    PLAN:  -  Pain control  -  DVT prophylaxis w/ lmwh 40mg sc daily  -  Daily PT- NWB of the left shoulder in sling.      >gentle ROM exercises of the left wrist/fingers  -  Case d/w Dr. Hernandes  -  Pt is orthopedically stable for discharge. Possible discharge today if pain control is achieved. Pain management team aware,   Follow-up with Dr. Hernandes in TWO WEEKS at [phone]

## 2020-08-05 NOTE — CONSULT NOTE ADULT - PROBLEM SELECTOR RECOMMENDATION 9
Pt with acute postoperative left shoulder pain which is somatic and neuropathic in nature due to POD #1, s/p Left total shoulder arthroplasty on 8/4.   Opioid pain recommendations   - Continue Tramadol 50mg PO q 8 hours. Monitor for sedation/ respiratory depression.   - Continue Oxycodone 5 mg PO q 4 hours PRN moderate pain. Monitor for sedation/ respiratory depression.   Non-opioid pain recommendations   - Continue Toradol 30mg IVP q 6 hours PRN severe pain  - Continue Acetaminophen 1 gram PO q 6 hours for 24 hours only. Monitor LFTs  - Gabapentin 400mg po q 8 hours. Monitor renal function.   Bowel Regimen  - Continue Miralax 17G PO daily  - Continue Senna 2 tablets at bedtime for constipation  Mild pain   - Non-pharmacological pain treatment recommendations  - Warm/ Cool packs PRN   - Repositioning extremity, elevation, imagery, relaxation, distraction.  - Physical therapy OOB if no contraindications   Recommendations discussed with primary team and RN  Pt may follow up with pain provider Dr. Cory Terrell at [facility] [phone].

## 2020-08-05 NOTE — DISCHARGE NOTE NURSING/CASE MANAGEMENT/SOCIAL WORK - PATIENT PORTAL LINK FT
You can access the FollowMyHealth Patient Portal offered by [facility] by registering at the following website: [email or web address]. By joining [facility]’s FollowMyHealth portal, you will also be able to view your health information using other applications (apps) compatible with our system.

## 2020-08-05 NOTE — DISCHARGE NOTE PROVIDER - NSDCCPCAREPLAN_GEN_ALL_CORE_FT
PRINCIPAL DISCHARGE DIAGNOSIS  Diagnosis: Status post total shoulder replacement, left  Assessment and Plan of Treatment: Pain Management- See Attached Medication Reconciliation  **Total Shoulder Replacement***  Weight Bearing Status:  NWB of left upper extremity  Equipment needs: Sling applied to left arm  Incision Site: REMOVE STAPLES on 8/19/20  Dvt prophylaxis: Asprin 325mg by mouth daily for 1 month  PT/Occupational Therapy are Activities of Daily Living as appropriate  Follow up with Orthopedic Surgeon for staple removal at: [phone]

## 2020-08-07 LAB — SURGICAL PATHOLOGY STUDY: SIGNIFICANT CHANGE UP

## 2020-09-15 NOTE — H&P ADULT - BACK
----- Message from Laura Merritt MD sent at 9/14/2020  4:02 PM CDT -----  Please tell pt her thoracic MRI shows no new or enhancing demyelinating lesions in her spinal cord.  One facet joint (where two adjacent vertebrae link together) is inflamed.  This facet joint is on the left at T10-T11 (which is at or just below the level of the belly button.) Is this where her discomfort is?  This has nothing to do with MS and is a separate issue.  Thanks LR   detailed exam

## 2021-03-19 PROBLEM — M19.012 PRIMARY OSTEOARTHRITIS, LEFT SHOULDER: Chronic | Status: ACTIVE | Noted: 2020-08-03

## 2021-03-19 PROBLEM — G47.33 OBSTRUCTIVE SLEEP APNEA (ADULT) (PEDIATRIC): Chronic | Status: ACTIVE | Noted: 2020-08-03

## 2021-04-05 DIAGNOSIS — Q76.2 CONGENITAL SPONDYLOLISTHESIS: ICD-10-CM

## 2021-04-06 ENCOUNTER — RESULT REVIEW (OUTPATIENT)
Age: 61
End: 2021-04-06

## 2021-04-06 ENCOUNTER — APPOINTMENT (OUTPATIENT)
Dept: SPINE | Facility: CLINIC | Age: 61
End: 2021-04-06
Payer: MEDICARE

## 2021-04-06 ENCOUNTER — OUTPATIENT (OUTPATIENT)
Dept: OUTPATIENT SERVICES | Facility: HOSPITAL | Age: 61
LOS: 1 days | End: 2021-04-06
Payer: MEDICARE

## 2021-04-06 VITALS
HEIGHT: 66 IN | OXYGEN SATURATION: 98 % | SYSTOLIC BLOOD PRESSURE: 131 MMHG | WEIGHT: 215 LBS | DIASTOLIC BLOOD PRESSURE: 79 MMHG | HEART RATE: 60 BPM | RESPIRATION RATE: 12 BRPM | TEMPERATURE: 97.5 F | BODY MASS INDEX: 34.55 KG/M2

## 2021-04-06 DIAGNOSIS — H33.21 SEROUS RETINAL DETACHMENT, RIGHT EYE: Chronic | ICD-10-CM

## 2021-04-06 DIAGNOSIS — M54.30 SCIATICA, UNSPECIFIED SIDE: ICD-10-CM

## 2021-04-06 DIAGNOSIS — M54.10 RADICULOPATHY, SITE UNSPECIFIED: ICD-10-CM

## 2021-04-06 DIAGNOSIS — Z98.890 OTHER SPECIFIED POSTPROCEDURAL STATES: Chronic | ICD-10-CM

## 2021-04-06 DIAGNOSIS — Z98.89 OTHER SPECIFIED POSTPROCEDURAL STATES: Chronic | ICD-10-CM

## 2021-04-06 DIAGNOSIS — M54.5 LOW BACK PAIN: ICD-10-CM

## 2021-04-06 DIAGNOSIS — M47.817 SPONDYLOSIS W/OUT MYELOPATHY OR RADICULOPATHY, LUMBOSACRAL REGION: ICD-10-CM

## 2021-04-06 DIAGNOSIS — M51.26 OTHER INTERVERTEBRAL DISC DISPLACEMENT, LUMBAR REGION: ICD-10-CM

## 2021-04-06 PROCEDURE — 99072 ADDL SUPL MATRL&STAF TM PHE: CPT

## 2021-04-06 PROCEDURE — 72114 X-RAY EXAM L-S SPINE BENDING: CPT

## 2021-04-06 PROCEDURE — 99205 OFFICE O/P NEW HI 60 MIN: CPT

## 2021-04-06 PROCEDURE — 72114 X-RAY EXAM L-S SPINE BENDING: CPT | Mod: 26

## 2021-04-08 PROBLEM — M54.5 LUMBAGO: Status: ACTIVE | Noted: 2021-04-08

## 2021-04-08 PROBLEM — M51.26 LUMBAR HERNIATED DISC: Status: ACTIVE | Noted: 2021-04-05

## 2021-04-08 PROBLEM — M47.817 LUMBOSACRAL SPONDYLOSIS: Status: ACTIVE | Noted: 2021-04-08

## 2021-04-08 PROBLEM — M54.30 SCIATICA: Status: ACTIVE | Noted: 2021-04-08

## 2021-04-08 NOTE — DISCUSSION/SUMMARY
[de-identified] : It was wonderful to meet the patient today in the office.  She have evidence of facet disease at L4-5.  I do not recommend surgical intervention.  I recommend can continue to conservative therapy including epidural steroid injections from a pain medicine physician.  I answered all her questions to the best my ability.  I will see her back should her symptoms continue.\par \par

## 2021-04-08 NOTE — HISTORY OF PRESENT ILLNESS
[Worsening] : worsening [___ mths] : [unfilled] month(s) ago [Walking] : walking [Sitting] : sitting [Standing] : standing [Constant] : ~He/She~ states the symptoms seem to be constant

## 2021-04-08 NOTE — PHYSICAL EXAM
[UE/LE] : Sensory: Intact in bilateral upper & lower extremities [ALL] : Biceps, brachioradialis, triceps, patellar, ankle and plantar 2+ and symmetric bilaterally

## 2022-12-15 NOTE — ED CDU PROVIDER INITIAL DAY NOTE - CPE EDP GASTRO NORM
Lavern Boo was seen and treated in our emergency department on 12/15/2022  Diagnosis:     [place]    She may return on this date: You may return to work once asymptomatic for 24hrs      If you have any questions or concerns, please don't hesitate to call        Gabriella Christensen PA-C    ______________________________           _______________          _______________  Hospital Representative                              Date                                Time
Sandhya Rodriguez accompanied Madonna Cline to the emergency department on 12/15/2022  Return date if applicable: 14/54/2694        If you have any questions or concerns, please don't hesitate to call        Gio Greene PA-C
normal...

## 2023-02-08 ENCOUNTER — OUTPATIENT (OUTPATIENT)
Dept: OUTPATIENT SERVICES | Facility: HOSPITAL | Age: 63
LOS: 1 days | Discharge: HOME | End: 2023-02-08

## 2023-02-08 DIAGNOSIS — Z98.89 OTHER SPECIFIED POSTPROCEDURAL STATES: Chronic | ICD-10-CM

## 2023-02-08 DIAGNOSIS — H33.21 SEROUS RETINAL DETACHMENT, RIGHT EYE: Chronic | ICD-10-CM

## 2023-02-08 DIAGNOSIS — Z98.890 OTHER SPECIFIED POSTPROCEDURAL STATES: Chronic | ICD-10-CM

## 2023-02-09 DIAGNOSIS — F43.10 POST-TRAUMATIC STRESS DISORDER, UNSPECIFIED: ICD-10-CM

## 2023-02-09 DIAGNOSIS — F41.9 ANXIETY DISORDER, UNSPECIFIED: ICD-10-CM

## 2023-02-09 DIAGNOSIS — F10.10 ALCOHOL ABUSE, UNCOMPLICATED: ICD-10-CM

## 2023-02-09 DIAGNOSIS — F14.20 COCAINE DEPENDENCE, UNCOMPLICATED: ICD-10-CM

## 2023-02-09 DIAGNOSIS — F34.1 DYSTHYMIC DISORDER: ICD-10-CM

## 2023-05-11 ENCOUNTER — OUTPATIENT (OUTPATIENT)
Dept: OUTPATIENT SERVICES | Facility: HOSPITAL | Age: 63
LOS: 1 days | End: 2023-05-11
Payer: MEDICARE

## 2023-05-11 VITALS
WEIGHT: 240.08 LBS | RESPIRATION RATE: 18 BRPM | TEMPERATURE: 99 F | SYSTOLIC BLOOD PRESSURE: 158 MMHG | DIASTOLIC BLOOD PRESSURE: 94 MMHG | OXYGEN SATURATION: 100 % | HEART RATE: 78 BPM | HEIGHT: 66 IN

## 2023-05-11 DIAGNOSIS — E66.01 MORBID (SEVERE) OBESITY DUE TO EXCESS CALORIES: ICD-10-CM

## 2023-05-11 DIAGNOSIS — Z98.41 CATARACT EXTRACTION STATUS, RIGHT EYE: Chronic | ICD-10-CM

## 2023-05-11 DIAGNOSIS — M23.332 OTHER MENISCUS DERANGEMENTS, OTHER MEDIAL MENISCUS, LEFT KNEE: ICD-10-CM

## 2023-05-11 DIAGNOSIS — I10 ESSENTIAL (PRIMARY) HYPERTENSION: ICD-10-CM

## 2023-05-11 DIAGNOSIS — G47.33 OBSTRUCTIVE SLEEP APNEA (ADULT) (PEDIATRIC): ICD-10-CM

## 2023-05-11 DIAGNOSIS — Z01.818 ENCOUNTER FOR OTHER PREPROCEDURAL EXAMINATION: ICD-10-CM

## 2023-05-11 DIAGNOSIS — E78.5 HYPERLIPIDEMIA, UNSPECIFIED: ICD-10-CM

## 2023-05-11 DIAGNOSIS — H33.21 SEROUS RETINAL DETACHMENT, RIGHT EYE: Chronic | ICD-10-CM

## 2023-05-11 DIAGNOSIS — Z98.890 OTHER SPECIFIED POSTPROCEDURAL STATES: Chronic | ICD-10-CM

## 2023-05-11 DIAGNOSIS — Z98.89 OTHER SPECIFIED POSTPROCEDURAL STATES: Chronic | ICD-10-CM

## 2023-05-11 PROCEDURE — G0463: CPT

## 2023-05-11 RX ORDER — ROSUVASTATIN CALCIUM 5 MG/1
1 TABLET ORAL
Qty: 0 | Refills: 0 | DISCHARGE

## 2023-05-11 RX ORDER — GLYCOPYRROLATE AND FORMOTEROL FUMARATE 9; 4.8 UG/1; UG/1
2 AEROSOL, METERED RESPIRATORY (INHALATION)
Qty: 0 | Refills: 0 | DISCHARGE

## 2023-05-11 RX ORDER — GABAPENTIN 400 MG/1
1 CAPSULE ORAL
Qty: 0 | Refills: 0 | DISCHARGE

## 2023-05-11 RX ORDER — ICOSAPENT ETHYL 500 MG/1
2 CAPSULE, LIQUID FILLED ORAL
Qty: 0 | Refills: 0 | DISCHARGE

## 2023-05-11 RX ORDER — PREGABALIN 225 MG/1
1 CAPSULE ORAL
Qty: 0 | Refills: 0 | DISCHARGE

## 2023-05-11 RX ORDER — AMLODIPINE BESYLATE 2.5 MG/1
1 TABLET ORAL
Qty: 0 | Refills: 0 | DISCHARGE

## 2023-05-11 RX ORDER — LOSARTAN POTASSIUM 100 MG/1
1 TABLET, FILM COATED ORAL
Qty: 0 | Refills: 0 | DISCHARGE

## 2023-05-11 NOTE — H&P PST ADULT - PROBLEM SELECTOR PLAN 4
scheduled for left knee arthroscopy.    Pt instructed to be NPO the night before and the morning of surgery except for BP meds with sip of water, Provided with chlorhexidene 4% solution to wash for 3 days including the day of surgery. Written instructions given to pt and reviewed for understanding. Escort required. Tylenol only to be used prior to surgery.

## 2023-05-11 NOTE — H&P PST ADULT - NSICDXPASTSURGICALHX_GEN_ALL_CORE_FT
PAST SURGICAL HISTORY:  H/O bilateral cataract extraction     H/O nasal septoplasty 2014    History of carpal tunnel surgery of left wrist 2/2017    History of hysterectomy 1994    History of shoulder surgery left shoulder arthroscopy- 2010    History of total knee replacement 2011 right knee    RD (retinal detachment), right     S/P hernia repair incarcerated hernia with mesh- May 2015    Status post rotator cuff repair Left rotator 7/2016

## 2023-05-11 NOTE — H&P PST ADULT - MUSCULOSKELETAL COMMENTS
right knee replacement / left knee meniscus derangements left knee unable to climb stairs walks with cane

## 2023-05-11 NOTE — H&P PST ADULT - PROBLEM SELECTOR PLAN 3
Pt told to bring in CPAP machine am of surgery. Sleep Study one year ago on [facility] no further information given form pt.

## 2023-05-11 NOTE — H&P PST ADULT - NSICDXPASTMEDICALHX_GEN_ALL_CORE_FT
PAST MEDICAL HISTORY:  Anxiety     Bursitis     Carpal tunnel syndrome on left     Carpal tunnel syndrome, bilateral     Cataract     Depression     H/O chronic bronchitis     Hiatal hernia     HTN (hypertension)     Hyperlipemia     Insomnia     Migraine aura without headache     Morbidly obese     ALKA on CPAP 14 cm H2O    Other meniscus derangements, other medial meniscus, left knee     Ovarian cyst     Primary osteoarthritis of left shoulder     Right retinal detachment     Rotator cuff tear, left     Sleep apnea     Stress incontinence, female     TIA (transient ischemic attack) with slurred speach and blurred vision , possible migraines, pt under neurology care

## 2023-05-11 NOTE — H&P PST ADULT - ASSESSMENT
62 yr old pleasant female with history of HTN, HDL, morbidly obese ( BMI 38) Sleep Apnea on CPAP uses routinely. Pt states" bad arthritis in my knee walking bone on bone. Pt had trouble with left knee sometimes popping sound. Pt walks with cane and uneven gait. Pt scheduled for left knee arthroscopy on 5/16/2023. Pt had right knee and left shoulder replacements in the past. Pt had medical and cardio clearance done.

## 2023-05-19 PROBLEM — M23.332 OTHER MENISCUS DERANGEMENTS, OTHER MEDIAL MENISCUS, LEFT KNEE: Chronic | Status: ACTIVE | Noted: 2023-05-11

## 2023-05-22 VITALS
TEMPERATURE: 87 F | SYSTOLIC BLOOD PRESSURE: 162 MMHG | HEART RATE: 86 BPM | OXYGEN SATURATION: 94 % | DIASTOLIC BLOOD PRESSURE: 84 MMHG | RESPIRATION RATE: 16 BRPM

## 2023-05-22 NOTE — H&P ADULT - ASSESSMENT
H/H . Pt denies bleeding, GI bleeding, hematemesis, hematuria, BRBPR or melena . ASA … and Plavix … pre-cath.  Cr. IV NS@ cc/hr pre-cath.      Risks & benefits of procedure and alternative therapy have been explained to the patient including but not limited to: allergic reaction, bleeding w/possible need for blood transfusion, infection, renal and vascular compromise, limb damage, arrhythmia, stroke, vessel dissection/perforation, Myocardial infarction, emergent CABG. Informed consent obtained and in chart     61 yo F current smoker with FHx of CAD,  PMHx of HTN, HLD, pre-DM,  TIA 2012(no deficits), diastolic CHF, CKD, ALKA (non-compliant with CPAP)  now presents to [facility] for recommended cardiac catheterization with possible intervention if clinically indicated 2/2 pts risk factors, CCS class 2 anginal symptoms and abnormal NST    Pt initially presented to their outpatient cardiologist Dr. Frausto for preop risk assessment for left knee arthroscopy.     H/H 12/41 . Pt denies bleeding, GI bleeding, hematemesis, hematuria, BRBPR or melena . Pt.  loaded with  mg Po X 1 and Plavix 600 mg PO X 1  pre-cath.  Cr. 0.89, IV  cc bolus X 1 to be followed by NS @ 75 cc/hr X 2 pre-cath.      Risks & benefits of procedure and alternative therapy have been explained to the patient including but not limited to: allergic reaction, bleeding w/possible need for blood transfusion, infection, renal and vascular compromise, limb damage, arrhythmia, stroke, vessel dissection/perforation, Myocardial infarction, emergent CABG. Informed consent obtained and in chart     61 y/o F current smoker with FHx of CAD,  PMHx of HTN, HLD, pre-DM,  TIA 2012(no deficits), diastolic CHF, CKD, ALKA (non-compliant with CPAP), chronic bronchitis now presents to [facility] for recommended cardiac catheterization with possible intervention if clinically indicated 2/2 pts risk factors, CCS class 2 anginal symptoms and abnormal NST    Pt initially presented to their outpatient cardiologist Dr. Frausto for preop risk assessment for left knee arthroscopy.     H/H 12/41 . Pt denies bleeding, GI bleeding, hematemesis, hematuria, BRBPR or melena . Pt.  loaded with  mg Po X 1 and Plavix 600 mg PO X 1  pre-cath.  Cr. 0.89, IV  cc bolus X 1 to be followed by NS @ 75 cc/hr X 2 pre-cath.    Case d/w Dr. Frausto.    Risks & benefits of procedure and alternative therapy have been explained to the patient including but not limited to: allergic reaction, bleeding w/possible need for blood transfusion, infection, renal and vascular compromise, limb damage, arrhythmia, stroke, vessel dissection/perforation, Myocardial infarction, emergent CABG. Informed consent obtained and in chart

## 2023-05-22 NOTE — H&P ADULT - NSHPSOCIALHISTORY_GEN_ALL_CORE
ETOH:  Tobacco:  illicit drug use: ETOH: denies  Tobacco:current smoker; smokes 2 cig/day X 1 year; prior to that had quit for 5 years; smoked 1 PPD X 10 yrs prior to that   illicit drug use: denies

## 2023-05-22 NOTE — H&P ADULT - NSHPLABSRESULTS_GEN_ALL_CORE
12.8   10.52 )-----------( 330      ( 23 May 2023 13:05 )             41.3   05-23    139  |  103  |  25<H>  ----------------------------<  101<H>  3.9   |  25  |  0.89    Ca    9.6      23 May 2023 13:29    TPro  8.2  /  Alb  4.2  /  TBili  0.3  /  DBili  x   /  AST  21  /  ALT  16  /  AlkPhos  143<H>  05-23  PT/INR - ( 23 May 2023 13:05 )   PT: 11.0 sec;   INR: 0.93          PTT - ( 23 May 2023 13:05 )  PTT:38.4 sec

## 2023-05-22 NOTE — H&P ADULT - HISTORY OF PRESENT ILLNESS
Cardio: Dr. Frausto  Pharmacy:  Escort:    61 yo F with PMHx of HTN, HLD, pre-DM, hx TIA, diastolic CHF, CKD ( baseline____ stage ____) . Pt presented to their outpatient cardiologist Dr. Frausto for preop risk assessment for left knee arthroscopy. Per MD note, Echo 4/1/23: showed LVEF 63% w/ grade 1 DD. NST 5/17/23: moderate reversible perfusion defect of the inferior septal inferolateral wall most consistent with ischemia of the RCA versus left circumflex, EF 48% by nuclear. Pt denies CP/SOB, dizziness, palpitations, orthopnea/PND, leg swelling, LOC, bleeding, melena/hematochezia, fever, chills, URI symptoms, or recent illness.      In light of pts risk factors, CCS class _ anginal symptoms and abnormal NST, pt now presents to [facility] for recommended cardiac catheterization with possible intervention if clinically indicated.     Cardio: Dr. Frausto  Pharmacy:  Escort:    61 yo F with PMHx of HTN, HLD, pre-DM, hx TIA, diastolic CHF, CKD ( baseline____ stage _____) . Pt presented to their outpatient cardiologist Dr. Frausto for preop risk assessment for left knee arthroscopy. Per MD note, Echo 4/1/23: showed LVEF 63% w/ grade 1 DD. NST 5/17/23: moderate reversible perfusion defect of the inferior septal inferolateral wall most consistent with ischemia of the RCA versus left circumflex, EF 48% by nuclear. Pt denies CP/SOB, dizziness, palpitations, orthopnea/PND, leg swelling, LOC, bleeding, melena/hematochezia, fever, chills, URI symptoms, or recent illness.      In light of pts risk factors, CCS class _ anginal symptoms and abnormal NST, pt now presents to [facility] for recommended cardiac catheterization with possible intervention if clinically indicated.     Cardio: Dr. Frausto  Pharmacy:  Escort:    63 yo F with PMHx of HTN, HLD, pre-DM, hx TIA, diastolic CHF, CKD ( baseline____ stage _____) . Pt presented to their outpatient cardiologist Dr. Frausto for preop risk assessment for left knee arthroscopy. Per MD note, Echo 4/1/23: showed LVEF 63% w/ grade 1 DD. NST 5/17/23: moderate reversible perfusion defect of the inferior septal inferolateral wall most consistent with ischemia of the RCA versus left circumflex, EF 48% by nuclear. Pt denies CP/SOB, dizziness, palpitations, orthopnea/PND, leg swelling, LOC, bleeding, melena/hematochezia, fever, chills, URI symptoms, or recent illness.      In light of pts risk factors, CCS class _ anginal symptoms and abnormal NST, pt now presents to [facility] for recommended cardiac catheterization with possible intervention if clinically indicated.      Of note pt s/p L knee arthroscopy 5/11/23.  Cardio: Dr. Frausto  Pharmacy: [place] [facility], [facility], meds reviewed with list at bedside  Escort:: friend Brissa    61 yo F current smoker with FHx of CAD,  PMHx of HTN, HLD, pre-DM,  TIA 2012(no deficits), diastolic CHF, CKD, ALKA (non-compliant with CPAP) who t initially presented to their outpatient cardiologist Dr. Frausto for preop risk assessment for left knee arthroscopy. Per MD note, Echo 4/1/23: showed LVEF 63% w/ grade 1 DD. NST 5/17/23: moderate reversible perfusion defect of the inferior septal inferolateral wall most consistent with ischemia of the RCA versus left circumflex, EF 48% by nuclear. Pt denies CP/SOB, dizziness, palpitations, orthopnea/PND, leg swelling, LOC, bleeding, melena/hematochezia, fever, chills, URI symptoms, or recent illness.      In light of pts risk factors, CCS class 2 anginal symptoms and abnormal NST, pt now presents to [facility] for recommended cardiac catheterization with possible intervention if clinically indicated.       Cardio: Dr. Frausto  Pharmacy: [facility], [facility], meds reviewed with list at bedside; unable to input hydralazine 25 mg PO daily (last dose today am), vit D3 50,000 IU once weeky and PRN benzonatate  in sunrise 2/2 technical difficulties; rest meds in.   Escort:: friend Brissa    63 yo F current smoker with FHx of CAD,  PMHx of HTN, HLD, pre-DM,  TIA 2012(no deficits), diastolic CHF, CKD, ALKA (non-compliant with CPAP), chronic bronchitis who t initially presented to their outpatient cardiologist Dr. Frausto for preop risk assessment for left knee arthroscopy. Per MD note, Echo 4/1/23: showed LVEF 63% w/ grade 1 DD. NST 5/17/23: moderate reversible perfusion defect of the inferior septal inferolateral wall most consistent with ischemia of the RCA versus left circumflex, EF 48% by nuclear. Pt denies CP/SOB, dizziness, palpitations, orthopnea/PND, leg swelling, LOC, bleeding, melena/hematochezia, fever, chills, URI symptoms, or recent illness.      In light of pts risk factors, CCS class 2 anginal symptoms and abnormal NST, pt now presents to [facility] for recommended cardiac catheterization with possible intervention if clinically indicated.

## 2023-05-23 ENCOUNTER — TRANSCRIPTION ENCOUNTER (OUTPATIENT)
Age: 63
End: 2023-05-23

## 2023-05-23 ENCOUNTER — INPATIENT (INPATIENT)
Facility: HOSPITAL | Age: 63
LOS: 0 days | Discharge: HOME | DRG: 247 | End: 2023-05-24
Attending: INTERNAL MEDICINE | Admitting: INTERNAL MEDICINE
Payer: MEDICARE

## 2023-05-23 DIAGNOSIS — Z98.890 OTHER SPECIFIED POSTPROCEDURAL STATES: Chronic | ICD-10-CM

## 2023-05-23 DIAGNOSIS — H33.21 SEROUS RETINAL DETACHMENT, RIGHT EYE: Chronic | ICD-10-CM

## 2023-05-23 DIAGNOSIS — Z98.89 OTHER SPECIFIED POSTPROCEDURAL STATES: Chronic | ICD-10-CM

## 2023-05-23 DIAGNOSIS — Z98.41 CATARACT EXTRACTION STATUS, RIGHT EYE: Chronic | ICD-10-CM

## 2023-05-23 LAB
A1C WITH ESTIMATED AVERAGE GLUCOSE RESULT: 5.7 % — HIGH (ref 4–5.6)
ALBUMIN SERPL ELPH-MCNC: 4.2 G/DL — SIGNIFICANT CHANGE UP (ref 3.3–5)
ALP SERPL-CCNC: 143 U/L — HIGH (ref 40–120)
ALT FLD-CCNC: 16 U/L — SIGNIFICANT CHANGE UP (ref 10–45)
ANION GAP SERPL CALC-SCNC: 11 MMOL/L — SIGNIFICANT CHANGE UP (ref 5–17)
APTT BLD: 38.4 SEC — HIGH (ref 27.5–35.5)
AST SERPL-CCNC: 21 U/L — SIGNIFICANT CHANGE UP (ref 10–40)
BASOPHILS # BLD AUTO: 0.04 K/UL — SIGNIFICANT CHANGE UP (ref 0–0.2)
BASOPHILS NFR BLD AUTO: 0.4 % — SIGNIFICANT CHANGE UP (ref 0–2)
BILIRUB SERPL-MCNC: 0.3 MG/DL — SIGNIFICANT CHANGE UP (ref 0.2–1.2)
BUN SERPL-MCNC: 25 MG/DL — HIGH (ref 7–23)
CALCIUM SERPL-MCNC: 9.6 MG/DL — SIGNIFICANT CHANGE UP (ref 8.4–10.5)
CHLORIDE SERPL-SCNC: 103 MMOL/L — SIGNIFICANT CHANGE UP (ref 96–108)
CHOLEST SERPL-MCNC: 270 MG/DL — HIGH
CO2 SERPL-SCNC: 25 MMOL/L — SIGNIFICANT CHANGE UP (ref 22–31)
CREAT SERPL-MCNC: 0.89 MG/DL — SIGNIFICANT CHANGE UP (ref 0.5–1.3)
EGFR: 73 ML/MIN/1.73M2 — SIGNIFICANT CHANGE UP
EOSINOPHIL # BLD AUTO: 0.35 K/UL — SIGNIFICANT CHANGE UP (ref 0–0.5)
EOSINOPHIL NFR BLD AUTO: 3.3 % — SIGNIFICANT CHANGE UP (ref 0–6)
ESTIMATED AVERAGE GLUCOSE: 117 MG/DL — HIGH (ref 68–114)
GLUCOSE SERPL-MCNC: 101 MG/DL — HIGH (ref 70–99)
HCT VFR BLD CALC: 41.3 % — SIGNIFICANT CHANGE UP (ref 34.5–45)
HDLC SERPL-MCNC: 65 MG/DL — SIGNIFICANT CHANGE UP
HGB BLD-MCNC: 12.8 G/DL — SIGNIFICANT CHANGE UP (ref 11.5–15.5)
IMM GRANULOCYTES NFR BLD AUTO: 0.6 % — SIGNIFICANT CHANGE UP (ref 0–0.9)
INR BLD: 0.93 — SIGNIFICANT CHANGE UP (ref 0.88–1.16)
LIPID PNL WITH DIRECT LDL SERPL: 181 MG/DL — HIGH
LYMPHOCYTES # BLD AUTO: 1.66 K/UL — SIGNIFICANT CHANGE UP (ref 1–3.3)
LYMPHOCYTES # BLD AUTO: 15.8 % — SIGNIFICANT CHANGE UP (ref 13–44)
MCHC RBC-ENTMCNC: 26.9 PG — LOW (ref 27–34)
MCHC RBC-ENTMCNC: 31 GM/DL — LOW (ref 32–36)
MCV RBC AUTO: 86.9 FL — SIGNIFICANT CHANGE UP (ref 80–100)
MONOCYTES # BLD AUTO: 0.96 K/UL — HIGH (ref 0–0.9)
MONOCYTES NFR BLD AUTO: 9.1 % — SIGNIFICANT CHANGE UP (ref 2–14)
NEUTROPHILS # BLD AUTO: 7.45 K/UL — HIGH (ref 1.8–7.4)
NEUTROPHILS NFR BLD AUTO: 70.8 % — SIGNIFICANT CHANGE UP (ref 43–77)
NON HDL CHOLESTEROL: 205 MG/DL — HIGH
NRBC # BLD: 0 /100 WBCS — SIGNIFICANT CHANGE UP (ref 0–0)
PLATELET # BLD AUTO: 330 K/UL — SIGNIFICANT CHANGE UP (ref 150–400)
POTASSIUM SERPL-MCNC: 3.9 MMOL/L — SIGNIFICANT CHANGE UP (ref 3.5–5.3)
POTASSIUM SERPL-SCNC: 3.9 MMOL/L — SIGNIFICANT CHANGE UP (ref 3.5–5.3)
PROT SERPL-MCNC: 8.2 G/DL — SIGNIFICANT CHANGE UP (ref 6–8.3)
PROTHROM AB SERPL-ACNC: 11 SEC — SIGNIFICANT CHANGE UP (ref 10.5–13.4)
RBC # BLD: 4.75 M/UL — SIGNIFICANT CHANGE UP (ref 3.8–5.2)
RBC # FLD: 15.3 % — HIGH (ref 10.3–14.5)
SODIUM SERPL-SCNC: 139 MMOL/L — SIGNIFICANT CHANGE UP (ref 135–145)
TRIGL SERPL-MCNC: 119 MG/DL — SIGNIFICANT CHANGE UP
WBC # BLD: 10.52 K/UL — HIGH (ref 3.8–10.5)
WBC # FLD AUTO: 10.52 K/UL — HIGH (ref 3.8–10.5)

## 2023-05-23 PROCEDURE — 93010 ELECTROCARDIOGRAM REPORT: CPT | Mod: 77

## 2023-05-23 PROCEDURE — 93010 ELECTROCARDIOGRAM REPORT: CPT

## 2023-05-23 RX ORDER — METOPROLOL TARTRATE 50 MG
25 TABLET ORAL DAILY
Refills: 0 | Status: DISCONTINUED | OUTPATIENT
Start: 2023-05-23 | End: 2023-05-24

## 2023-05-23 RX ORDER — PREGABALIN 225 MG/1
1 CAPSULE ORAL
Qty: 0 | Refills: 0 | DISCHARGE

## 2023-05-23 RX ORDER — SODIUM CHLORIDE 9 MG/ML
1000 INJECTION INTRAMUSCULAR; INTRAVENOUS; SUBCUTANEOUS
Refills: 0 | Status: DISCONTINUED | OUTPATIENT
Start: 2023-05-23 | End: 2023-05-24

## 2023-05-23 RX ORDER — QUETIAPINE FUMARATE 200 MG/1
25 TABLET, FILM COATED ORAL EVERY 24 HOURS
Refills: 0 | Status: DISCONTINUED | OUTPATIENT
Start: 2023-05-23 | End: 2023-05-24

## 2023-05-23 RX ORDER — CHOLECALCIFEROL (VITAMIN D3) 125 MCG
1000 CAPSULE ORAL DAILY
Refills: 0 | Status: DISCONTINUED | OUTPATIENT
Start: 2023-05-23 | End: 2023-05-24

## 2023-05-23 RX ORDER — ATORVASTATIN CALCIUM 80 MG/1
40 TABLET, FILM COATED ORAL AT BEDTIME
Refills: 0 | Status: DISCONTINUED | OUTPATIENT
Start: 2023-05-23 | End: 2023-05-24

## 2023-05-23 RX ORDER — NEBIVOLOL HYDROCHLORIDE 5 MG/1
1 TABLET ORAL
Qty: 0 | Refills: 0 | DISCHARGE

## 2023-05-23 RX ORDER — QUETIAPINE FUMARATE 200 MG/1
1 TABLET, FILM COATED ORAL
Refills: 0 | DISCHARGE

## 2023-05-23 RX ORDER — ARIPIPRAZOLE 15 MG/1
2 TABLET ORAL DAILY
Refills: 0 | Status: DISCONTINUED | OUTPATIENT
Start: 2023-05-24 | End: 2023-05-24

## 2023-05-23 RX ORDER — TRAZODONE HCL 50 MG
100 TABLET ORAL DAILY
Refills: 0 | Status: DISCONTINUED | OUTPATIENT
Start: 2023-05-23 | End: 2023-05-23

## 2023-05-23 RX ORDER — SODIUM CHLORIDE 9 MG/ML
250 INJECTION INTRAMUSCULAR; INTRAVENOUS; SUBCUTANEOUS ONCE
Refills: 0 | Status: COMPLETED | OUTPATIENT
Start: 2023-05-23 | End: 2023-05-23

## 2023-05-23 RX ORDER — FOLIC ACID 0.8 MG
1 TABLET ORAL DAILY
Refills: 0 | Status: DISCONTINUED | OUTPATIENT
Start: 2023-05-23 | End: 2023-05-24

## 2023-05-23 RX ORDER — CLOPIDOGREL BISULFATE 75 MG/1
600 TABLET, FILM COATED ORAL ONCE
Refills: 0 | Status: COMPLETED | OUTPATIENT
Start: 2023-05-23 | End: 2023-05-23

## 2023-05-23 RX ORDER — HYDROCHLOROTHIAZIDE 25 MG
1 TABLET ORAL
Refills: 0 | DISCHARGE

## 2023-05-23 RX ORDER — METOPROLOL TARTRATE 50 MG
25 TABLET ORAL ONCE
Refills: 0 | Status: COMPLETED | OUTPATIENT
Start: 2023-05-23 | End: 2023-05-23

## 2023-05-23 RX ORDER — TRAZODONE HCL 50 MG
1 TABLET ORAL
Refills: 0 | DISCHARGE

## 2023-05-23 RX ORDER — LIFITEGRAST 50 MG/ML
1 SOLUTION/ DROPS OPHTHALMIC
Qty: 0 | Refills: 0 | DISCHARGE

## 2023-05-23 RX ORDER — FLUOXETINE HCL 10 MG
40 CAPSULE ORAL DAILY
Refills: 0 | Status: DISCONTINUED | OUTPATIENT
Start: 2023-05-23 | End: 2023-05-24

## 2023-05-23 RX ORDER — CHLORHEXIDINE GLUCONATE 213 G/1000ML
1 SOLUTION TOPICAL ONCE
Refills: 0 | Status: DISCONTINUED | OUTPATIENT
Start: 2023-05-23 | End: 2023-05-24

## 2023-05-23 RX ORDER — ASPIRIN/CALCIUM CARB/MAGNESIUM 324 MG
325 TABLET ORAL ONCE
Refills: 0 | Status: COMPLETED | OUTPATIENT
Start: 2023-05-23 | End: 2023-05-23

## 2023-05-23 RX ORDER — AMLODIPINE BESYLATE 2.5 MG/1
10 TABLET ORAL DAILY
Refills: 0 | Status: DISCONTINUED | OUTPATIENT
Start: 2023-05-24 | End: 2023-05-24

## 2023-05-23 RX ORDER — LABETALOL HCL 100 MG
10 TABLET ORAL ONCE
Refills: 0 | Status: COMPLETED | OUTPATIENT
Start: 2023-05-23 | End: 2023-05-23

## 2023-05-23 RX ORDER — SODIUM CHLORIDE 9 MG/ML
500 INJECTION INTRAMUSCULAR; INTRAVENOUS; SUBCUTANEOUS
Refills: 0 | Status: DISCONTINUED | OUTPATIENT
Start: 2023-05-23 | End: 2023-05-23

## 2023-05-23 RX ORDER — CLOPIDOGREL BISULFATE 75 MG/1
75 TABLET, FILM COATED ORAL DAILY
Refills: 0 | Status: DISCONTINUED | OUTPATIENT
Start: 2023-05-24 | End: 2023-05-24

## 2023-05-23 RX ORDER — HYDRALAZINE HCL 50 MG
25 TABLET ORAL
Refills: 0 | Status: DISCONTINUED | OUTPATIENT
Start: 2023-05-23 | End: 2023-05-24

## 2023-05-23 RX ORDER — ASPIRIN/CALCIUM CARB/MAGNESIUM 324 MG
81 TABLET ORAL DAILY
Refills: 0 | Status: DISCONTINUED | OUTPATIENT
Start: 2023-05-24 | End: 2023-05-24

## 2023-05-23 RX ORDER — HYDRALAZINE HCL 50 MG
1 TABLET ORAL
Refills: 0 | DISCHARGE

## 2023-05-23 RX ADMIN — Medication 25 MILLIGRAM(S): at 16:58

## 2023-05-23 RX ADMIN — SODIUM CHLORIDE 125 MILLILITER(S): 9 INJECTION INTRAMUSCULAR; INTRAVENOUS; SUBCUTANEOUS at 17:03

## 2023-05-23 RX ADMIN — CLOPIDOGREL BISULFATE 600 MILLIGRAM(S): 75 TABLET, FILM COATED ORAL at 14:07

## 2023-05-23 RX ADMIN — SODIUM CHLORIDE 500 MILLILITER(S): 9 INJECTION INTRAMUSCULAR; INTRAVENOUS; SUBCUTANEOUS at 13:56

## 2023-05-23 RX ADMIN — Medication 325 MILLIGRAM(S): at 14:07

## 2023-05-23 RX ADMIN — ATORVASTATIN CALCIUM 40 MILLIGRAM(S): 80 TABLET, FILM COATED ORAL at 21:13

## 2023-05-23 RX ADMIN — Medication 10 MILLIGRAM(S): at 16:58

## 2023-05-23 RX ADMIN — Medication 1 MILLIGRAM(S): at 19:15

## 2023-05-23 RX ADMIN — Medication 25 MILLIGRAM(S): at 19:15

## 2023-05-23 RX ADMIN — QUETIAPINE FUMARATE 25 MILLIGRAM(S): 200 TABLET, FILM COATED ORAL at 21:13

## 2023-05-23 RX ADMIN — SODIUM CHLORIDE 75 MILLILITER(S): 9 INJECTION INTRAMUSCULAR; INTRAVENOUS; SUBCUTANEOUS at 13:56

## 2023-05-24 ENCOUNTER — TRANSCRIPTION ENCOUNTER (OUTPATIENT)
Age: 63
End: 2023-05-24

## 2023-05-24 VITALS
SYSTOLIC BLOOD PRESSURE: 130 MMHG | HEART RATE: 89 BPM | RESPIRATION RATE: 18 BRPM | DIASTOLIC BLOOD PRESSURE: 63 MMHG | OXYGEN SATURATION: 98 %

## 2023-05-24 LAB
ANION GAP SERPL CALC-SCNC: 13 MMOL/L — SIGNIFICANT CHANGE UP (ref 5–17)
BUN SERPL-MCNC: 20 MG/DL — SIGNIFICANT CHANGE UP (ref 7–23)
CALCIUM SERPL-MCNC: 8.8 MG/DL — SIGNIFICANT CHANGE UP (ref 8.4–10.5)
CHLORIDE SERPL-SCNC: 104 MMOL/L — SIGNIFICANT CHANGE UP (ref 96–108)
CO2 SERPL-SCNC: 23 MMOL/L — SIGNIFICANT CHANGE UP (ref 22–31)
CREAT SERPL-MCNC: 0.94 MG/DL — SIGNIFICANT CHANGE UP (ref 0.5–1.3)
EGFR: 68 ML/MIN/1.73M2 — SIGNIFICANT CHANGE UP
GLUCOSE SERPL-MCNC: 181 MG/DL — HIGH (ref 70–99)
HCT VFR BLD CALC: 39 % — SIGNIFICANT CHANGE UP (ref 34.5–45)
HGB BLD-MCNC: 11.7 G/DL — SIGNIFICANT CHANGE UP (ref 11.5–15.5)
MAGNESIUM SERPL-MCNC: 2.1 MG/DL — SIGNIFICANT CHANGE UP (ref 1.6–2.6)
MCHC RBC-ENTMCNC: 26.8 PG — LOW (ref 27–34)
MCHC RBC-ENTMCNC: 30 GM/DL — LOW (ref 32–36)
MCV RBC AUTO: 89.4 FL — SIGNIFICANT CHANGE UP (ref 80–100)
NRBC # BLD: 0 /100 WBCS — SIGNIFICANT CHANGE UP (ref 0–0)
PLATELET # BLD AUTO: 285 K/UL — SIGNIFICANT CHANGE UP (ref 150–400)
POCT ISTAT CREATININE: 0.9 MG/DL — SIGNIFICANT CHANGE UP (ref 0.5–1.3)
POTASSIUM SERPL-MCNC: 3.8 MMOL/L — SIGNIFICANT CHANGE UP (ref 3.5–5.3)
POTASSIUM SERPL-SCNC: 3.8 MMOL/L — SIGNIFICANT CHANGE UP (ref 3.5–5.3)
RBC # BLD: 4.36 M/UL — SIGNIFICANT CHANGE UP (ref 3.8–5.2)
RBC # FLD: 15.4 % — HIGH (ref 10.3–14.5)
SODIUM SERPL-SCNC: 140 MMOL/L — SIGNIFICANT CHANGE UP (ref 135–145)
WBC # BLD: 9.85 K/UL — SIGNIFICANT CHANGE UP (ref 3.8–10.5)
WBC # FLD AUTO: 9.85 K/UL — SIGNIFICANT CHANGE UP (ref 3.8–10.5)

## 2023-05-24 PROCEDURE — 83735 ASSAY OF MAGNESIUM: CPT

## 2023-05-24 PROCEDURE — 80053 COMPREHEN METABOLIC PANEL: CPT

## 2023-05-24 PROCEDURE — 83036 HEMOGLOBIN GLYCOSYLATED A1C: CPT

## 2023-05-24 PROCEDURE — 99239 HOSP IP/OBS DSCHRG MGMT >30: CPT

## 2023-05-24 PROCEDURE — 82803 BLOOD GASES ANY COMBINATION: CPT

## 2023-05-24 PROCEDURE — 36415 COLL VENOUS BLD VENIPUNCTURE: CPT

## 2023-05-24 PROCEDURE — C1874: CPT

## 2023-05-24 PROCEDURE — 80048 BASIC METABOLIC PNL TOTAL CA: CPT

## 2023-05-24 PROCEDURE — C1725: CPT

## 2023-05-24 PROCEDURE — 85610 PROTHROMBIN TIME: CPT

## 2023-05-24 PROCEDURE — 82565 ASSAY OF CREATININE: CPT

## 2023-05-24 PROCEDURE — 85347 COAGULATION TIME ACTIVATED: CPT

## 2023-05-24 PROCEDURE — 93005 ELECTROCARDIOGRAM TRACING: CPT

## 2023-05-24 PROCEDURE — 85025 COMPLETE CBC W/AUTO DIFF WBC: CPT

## 2023-05-24 PROCEDURE — 85027 COMPLETE CBC AUTOMATED: CPT

## 2023-05-24 PROCEDURE — 93010 ELECTROCARDIOGRAM REPORT: CPT

## 2023-05-24 PROCEDURE — 80061 LIPID PANEL: CPT

## 2023-05-24 PROCEDURE — 85730 THROMBOPLASTIN TIME PARTIAL: CPT

## 2023-05-24 PROCEDURE — C1769: CPT

## 2023-05-24 PROCEDURE — C1894: CPT

## 2023-05-24 PROCEDURE — C1887: CPT

## 2023-05-24 RX ORDER — ARIPIPRAZOLE 15 MG/1
1 TABLET ORAL
Qty: 30 | Refills: 0
Start: 2023-05-24 | End: 2023-06-22

## 2023-05-24 RX ORDER — CLOPIDOGREL BISULFATE 75 MG/1
1 TABLET, FILM COATED ORAL
Qty: 30 | Refills: 11
Start: 2023-05-24 | End: 2024-05-17

## 2023-05-24 RX ORDER — QUETIAPINE FUMARATE 200 MG/1
1 TABLET, FILM COATED ORAL
Refills: 0 | DISCHARGE

## 2023-05-24 RX ORDER — ASPIRIN/CALCIUM CARB/MAGNESIUM 324 MG
1 TABLET ORAL
Qty: 30 | Refills: 11
Start: 2023-05-24 | End: 2024-05-17

## 2023-05-24 RX ORDER — CHOLECALCIFEROL (VITAMIN D3) 125 MCG
1 CAPSULE ORAL
Qty: 0 | Refills: 0 | DISCHARGE

## 2023-05-24 RX ORDER — FLUOXETINE HCL 10 MG
1 CAPSULE ORAL
Qty: 30 | Refills: 0
Start: 2023-05-24 | End: 2023-06-22

## 2023-05-24 RX ORDER — CHOLECALCIFEROL (VITAMIN D3) 125 MCG
1 CAPSULE ORAL
Qty: 30 | Refills: 0
Start: 2023-05-24 | End: 2023-06-22

## 2023-05-24 RX ORDER — METOPROLOL TARTRATE 50 MG
1 TABLET ORAL
Refills: 0 | DISCHARGE

## 2023-05-24 RX ORDER — POTASSIUM CHLORIDE 20 MEQ
20 PACKET (EA) ORAL ONCE
Refills: 0 | Status: COMPLETED | OUTPATIENT
Start: 2023-05-24 | End: 2023-05-24

## 2023-05-24 RX ORDER — ATORVASTATIN CALCIUM 80 MG/1
1 TABLET, FILM COATED ORAL
Qty: 30 | Refills: 2
Start: 2023-05-24 | End: 2023-08-21

## 2023-05-24 RX ORDER — HYDRALAZINE HCL 50 MG
1 TABLET ORAL
Qty: 60 | Refills: 0
Start: 2023-05-24 | End: 2023-06-22

## 2023-05-24 RX ORDER — ARIPIPRAZOLE 15 MG/1
1 TABLET ORAL
Refills: 0 | DISCHARGE

## 2023-05-24 RX ORDER — AMLODIPINE BESYLATE 2.5 MG/1
1 TABLET ORAL
Refills: 0 | DISCHARGE

## 2023-05-24 RX ORDER — NICOTINE POLACRILEX 2 MG
1 GUM BUCCAL
Qty: 2 | Refills: 0
Start: 2023-05-24 | End: 2023-06-22

## 2023-05-24 RX ORDER — FOLIC ACID 0.8 MG
1 TABLET ORAL
Qty: 30 | Refills: 0
Start: 2023-05-24 | End: 2023-06-22

## 2023-05-24 RX ORDER — QUETIAPINE FUMARATE 200 MG/1
1 TABLET, FILM COATED ORAL
Qty: 30 | Refills: 0
Start: 2023-05-24 | End: 2023-06-22

## 2023-05-24 RX ORDER — METOPROLOL TARTRATE 50 MG
1 TABLET ORAL
Qty: 30 | Refills: 0
Start: 2023-05-24 | End: 2023-06-22

## 2023-05-24 RX ORDER — ATORVASTATIN CALCIUM 80 MG/1
1 TABLET, FILM COATED ORAL
Refills: 0 | DISCHARGE

## 2023-05-24 RX ORDER — AMLODIPINE BESYLATE 2.5 MG/1
1 TABLET ORAL
Qty: 30 | Refills: 0
Start: 2023-05-24 | End: 2023-06-22

## 2023-05-24 RX ORDER — ASPIRIN/CALCIUM CARB/MAGNESIUM 324 MG
0 TABLET ORAL
Refills: 0 | DISCHARGE

## 2023-05-24 RX ORDER — FOLIC ACID 0.8 MG
1 TABLET ORAL
Qty: 0 | Refills: 0 | DISCHARGE

## 2023-05-24 RX ADMIN — Medication 1 MILLIGRAM(S): at 12:36

## 2023-05-24 RX ADMIN — Medication 40 MILLIGRAM(S): at 11:48

## 2023-05-24 RX ADMIN — Medication 1000 UNIT(S): at 11:49

## 2023-05-24 RX ADMIN — Medication 81 MILLIGRAM(S): at 11:48

## 2023-05-24 RX ADMIN — ARIPIPRAZOLE 2 MILLIGRAM(S): 15 TABLET ORAL at 11:48

## 2023-05-24 RX ADMIN — AMLODIPINE BESYLATE 10 MILLIGRAM(S): 2.5 TABLET ORAL at 05:12

## 2023-05-24 RX ADMIN — Medication 25 MILLIGRAM(S): at 05:12

## 2023-05-24 RX ADMIN — Medication 20 MILLIEQUIVALENT(S): at 11:49

## 2023-05-24 RX ADMIN — CLOPIDOGREL BISULFATE 75 MILLIGRAM(S): 75 TABLET, FILM COATED ORAL at 11:48

## 2023-05-24 NOTE — DISCHARGE NOTE PROVIDER - HOSPITAL COURSE
62F, active smoker, w/ FHx of CAD and PMHx of HTN, HLD, HFpEF, pre-DM, CKD, ALKA (non compliance w/ CPAP), TIA (2012), and chronic bronchitis, presented to [facility] for recommended cardiac cath w/ possible intervention if clinically indicated, in light of pts risk factors, CCS class II anginal symptoms and abnormal NST.  Pt now s/p cardiac cath 5/23: PAZ x 3 to p/m/dRCA; other vessels revealing LM mild, pLAD 70-75%, mLAD 70-75% (iFR + 0.85), LCx mild luminal, OM2 85-90%, ostial OM1 60%, access R radial artery. Pt is to return for staged PCI of residual LAD and OM2 in 4-5 weeks.  Pt admitted overnight for observation and telemetry monitoring. Pt seen and examined at bedside this AM without any complaints or events overnight, VSS, labs and telemetry reviewed and pt stable for discharge as discussed with Dr. Silvestre. Pt has received appropriate discharge instructions, including medication regimen, access site management and follow up with Dr. Frausto in 1-2 weeks.    Discharge medications: ASA 81mg QD, Plavix 75mg QD, ______________.   62F, active smoker (2 cigs/day), w/ FHx of CAD and PMHx of HTN, HLD, HFpEF (EF 63%), pre-DM, CKD, ALKA (non compliance w/ CPAP), TIA (2012), and chronic bronchitis, initially presented to their outpatient cardiologist Dr. Frausto for preop risk assessment for left knee arthroscopy. Per MD note, Echo 4/1/23: showed LVEF 63% w/ grade 1 DD. NST 5/17/23: moderate reversible perfusion defect of the inferior septal inferolateral wall most consistent with ischemia of the RCA versus left circumflex, EF 48% by nuclear. II light of pts risk factors, CCS class II anginal symptoms and abnormal NST, was referred to [facility] for recommended cardiac cath. Pt now s/p cardiac cath 5/23/23 w/ Dr Frausto: PAZ x 3 to p/m/d RCA; other vessels revealing LM mild, pLAD 70-75%, mLAD 70-75% (iFR + 0.85), LCx mild luminal, OM2 85-90%, ostial OM1 60%, access R radial artery access CDI w/o hematoma/oozing. Pt is to return for staged PCI of residual LAD and OM2 in 4-5 weeks. Pt will c/w ASA 81mg QD, Plavix 75mg QD. Elevated T Chol 270, . Pt reports non compliance w/ statin, now agrees to take Lipitor 40mg qd.    Pt admitted to cardiac tele overnight for observation and telemetry monitoring. Pt seen and examined at bedside this AM without any complaints or events overnight, VSS, labs and telemetry reviewed and pt stable for discharge as discussed with Dr. Silvestre. Pt has received appropriate discharge instructions, including medication regimen, access site management and follow up with Dr. Frausto in 1-2 weeks. No cardiac rehab prescribed as pt requires staged PCI.

## 2023-05-24 NOTE — DISCHARGE NOTE NURSING/CASE MANAGEMENT/SOCIAL WORK - NSDCVIVACCINE_GEN_ALL_CORE_FT
influenza, injectable, quadrivalent, preservative free; 06-Nov-2015 11:59; Becki Golden (RN); [employer]; [id number]; IntraMuscular; Deltoid Right.; 0.5 milliLiter(s); VIS (VIS Published: 07-Aug-2015, VIS Presented: 06-Nov-2015);

## 2023-05-24 NOTE — DISCHARGE NOTE NURSING/CASE MANAGEMENT/SOCIAL WORK - PATIENT PORTAL LINK FT
You can access the FollowMyHealth Patient Portal offered by [facility] by registering at the following website: [email or web address]. By joining [employer]’s FollowMyHealth portal, you will also be able to view your health information using other applications (apps) compatible with our system.

## 2023-05-24 NOTE — DISCHARGE NOTE PROVIDER - NSDCMRMEDTOKEN_GEN_ALL_CORE_FT
Abilify 2 mg oral tablet: 1 orally once a day  aspirin 81 mg oral delayed release capsule: orally  atorvastatin 40 mg oral tablet: 1 orally once a day  folic acid 1 mg oral tablet: 1 tab(s) orally once a day  metoprolol succinate 25 mg oral capsule, extended release: 1 orally once a day  Norvasc 10 mg oral tablet: 1 orally once a day  PROzac 40 mg oral capsule: 1 cap(s) orally once a day  Seroquel 25 mg oral tablet: 1 tab(s) orally once a day  Vitamin B12 100 mcg oral tablet: 1 tab(s) orally once a day  Vitamin D3 1000 intl units (25 mcg) oral capsule: 1 cap(s) orally once a day   Abilify 2 mg oral tablet: 1 tab(s) orally once a day  aspirin 81 mg oral delayed release tablet: 1 tab(s) orally once a day  atorvastatin 40 mg oral tablet: 1 tab(s) orally once a day (at bedtime)  clopidogrel 75 mg oral tablet: 1 tab(s) orally once a day  folic acid 1 mg oral tablet: 1 tab(s) orally once a day  hydrALAZINE 25 mg oral tablet: 1 tab(s) orally 2 times a day  metoprolol succinate 25 mg oral tablet, extended release: 1 tab(s) orally once a day  nicotine 7 mg/24 hr transdermal film, extended release: 1 patch transdermally once a day  Norvasc 10 mg oral tablet: 1 tab(s) orally once a day  PROzac 40 mg oral capsule: 1 cap(s) orally once a day  Seroquel 25 mg oral tablet: 1 tab(s) orally once a day  Vitamin B12 100 mcg oral tablet: 1 tab(s) orally once a day  Vitamin D3 1000 intl units (25 mcg) oral capsule: 1 cap(s) orally once a day   Abilify 2 mg oral tablet: 1 tab(s) orally once a day  aspirin 81 mg oral delayed release tablet: 1 tab(s) orally once a day  atorvastatin 80 mg oral tablet: 1 tab(s) orally once a day (at bedtime)  clopidogrel 75 mg oral tablet: 1 tab(s) orally once a day  folic acid 1 mg oral tablet: 1 tab(s) orally once a day  hydrALAZINE 25 mg oral tablet: 1 tab(s) orally 2 times a day  metoprolol succinate 25 mg oral tablet, extended release: 1 tab(s) orally once a day  nicotine 7 mg/24 hr transdermal film, extended release: 1 patch transdermally once a day  Norvasc 10 mg oral tablet: 1 tab(s) orally once a day  PROzac 40 mg oral capsule: 1 cap(s) orally once a day  Seroquel 25 mg oral tablet: 1 tab(s) orally once a day  Vitamin B12 100 mcg oral tablet: 1 tab(s) orally once a day  Vitamin D3 1000 intl units (25 mcg) oral capsule: 1 cap(s) orally once a day

## 2023-05-24 NOTE — DISCHARGE NOTE PROVIDER - NSDCFUADDINST_GEN_ALL_CORE_FT
- Do NOT drive or operate hazardous machinery for 24 hours. Limit your physical activity for 24-48 hours. Do NOT engage in sports, heavy work or heavy lifting more than 5 lbs for 5 days.   - You MAY shower and wash the area gently with soap and water. BUT no TUB BATHS, HOT TUBS OR SWIMMING FOR 5 DAYS.  Do not keep the area covered. Do not put any lotions, creams, or ointments on the site.  - Your procedure was done through your right wrist. If you observe flank bleeding from the puncture site, it is an emergency. Please put direct pressure on the site and go directly to the ER. Bleeding under the skin may also occur and a small "black and blue" may be expected. If the area appears to be expanding or swelling around the puncture site, apply manual compression and go immediately to the nearest ER. If your arm/hand becomes cool or blue and/or you are unable to move it, this must be treated as an emergency, go directly to the nearest ER. Look for signs of infection in the wrist: fever, red streaking of the arm, obvious pus formation and pain.  -If you have any issues or concerns regarding your access site, you may call [facility] Interventional Cardiology at [phone].

## 2023-05-24 NOTE — DISCHARGE NOTE PROVIDER - NSDCCPCAREPLAN_GEN_ALL_CORE_FT
PRINCIPAL DISCHARGE DIAGNOSIS  Diagnosis: CAD (coronary artery disease)  Assessment and Plan of Treatment: You were found to have blockages in the arteries of your heart, also known as Coronary Artery Disease. You underwent a cardiac catheterization on 5/23/23 and received three stents to the right coronary artery. You have residual blockages in the left anterior descending artery which you must return in 4-5 weeks to get a stent.  PLEASE CONTINUE ASPIRIN 81MG DAILY AND PLAVIX 75MG DAILY. DO NOT STOP THESE MEDICATIONS FOR ANY REASON AS THEY ARE KEEPING YOUR STENT OPEN AND PREVENTING A HEART ATTACK.   Avoid strenuous activity or heavy lifting anything more than 5lbs for the next five days. Do not take a bath or swim for the next five days; you may shower. For any bleeding or hematoma formation (hardened blood collection under the skin) at the access site of your right wrist please hold pressure and go to the emergency room. Please follow up with Dr. Frausto in 1-2 weeks. For recurrent chest pain, please call your doctor or go to the emergency room.      SECONDARY DISCHARGE DIAGNOSES  Diagnosis: HTN (hypertension)  Assessment and Plan of Treatment: Please continue ____ as listed to keep your blood pressure controlled. For blood pressure that is too high or too low please see your doctor or go to the emergency room as necessary.    Diagnosis: HLD (hyperlipidemia)  Assessment and Plan of Treatment: Please continue ____ at bedtime to keep your cholesterol low. High cholesterol contributes to heart disease.     PRINCIPAL DISCHARGE DIAGNOSIS  Diagnosis: CAD (coronary artery disease)  Assessment and Plan of Treatment: You were found to have blockages in the arteries of your heart, also known as Coronary Artery Disease. You had an abnormal outpatient stress test and underwent a cardiac catheterization on 5/23/23 and received three stents to the right coronary artery. You have residual blockages in the left anterior descending artery and Obtuse Marginal Artery for which you must return in 4-5 weeks to get more stents.  PLEASE CONTINUE ASPIRIN 81MG DAILY AND PLAVIX 75MG DAILY. DO NOT STOP THESE MEDICATIONS FOR ANY REASON AS THEY ARE KEEPING YOUR STENT OPEN AND PREVENTING A HEART ATTACK.      SECONDARY DISCHARGE DIAGNOSES  Diagnosis: HTN (hypertension)  Assessment and Plan of Treatment: Please continue Hydralazine 25mg twice a day, Amlodipine, and Metoprolol as prescribed to keep your blood pressure controlled. For blood pressure that is too high or too low please see your doctor or go to the emergency room as necessary.    Diagnosis: HLD (hyperlipidemia)  Assessment and Plan of Treatment: You were found to have high cholesterol with the "bad cholesterol", . Please start taking Lipitor 40mg at bedtime to keep your cholesterol low. High cholesterol contributes to heart disease.     PRINCIPAL DISCHARGE DIAGNOSIS  Diagnosis: CAD (coronary artery disease)  Assessment and Plan of Treatment: You were found to have blockages in the arteries of your heart, also known as Coronary Artery Disease. You had an abnormal outpatient stress test and underwent a cardiac catheterization on 5/23/23 and received three stents to the right coronary artery. You have residual blockages in the left anterior descending artery and Obtuse Marginal Artery for which you must return in 4-5 weeks to get more stents.  PLEASE CONTINUE ASPIRIN 81MG DAILY AND PLAVIX 75MG DAILY. DO NOT STOP THESE MEDICATIONS FOR ANY REASON AS THEY ARE KEEPING YOUR STENT OPEN AND PREVENTING A HEART ATTACK.      SECONDARY DISCHARGE DIAGNOSES  Diagnosis: HTN (hypertension)  Assessment and Plan of Treatment: Please continue Hydralazine 25mg twice a day, Amlodipine, and Metoprolol as prescribed to keep your blood pressure controlled. For blood pressure that is too high or too low please see your doctor or go to the emergency room as necessary.    Diagnosis: HLD (hyperlipidemia)  Assessment and Plan of Treatment: You were found to have high cholesterol with the "bad cholesterol", . Please start taking Lipitor 40mg at bedtime to keep your cholesterol low. High cholesterol contributes to heart disease.    Diagnosis: Encounter for smoking cessation counseling  Assessment and Plan of Treatment: If you smoke and already have heart or vascular disease, quitting smoking will reduce your risk of sudden cardiac death, heart attack, and death from other chronic diseases. Any amount of smoking, even light smoking or occasional smoking, damages the heart and blood vessels. One of the best ways to reduce your risk of heart disease is to avoid tobacco smoke. No matter how much or how long you've smoked, quitting will benefit you. We have provided you with resources to help with smoking cessation and we strongly recommend that you use the information provided and follow up with your outpatient physician to help with setting up a “quit date” if you have not already.     PRINCIPAL DISCHARGE DIAGNOSIS  Diagnosis: CAD (coronary artery disease)  Assessment and Plan of Treatment: You were found to have blockages in the arteries of your heart, also known as Coronary Artery Disease. You had an abnormal outpatient stress test and underwent a cardiac catheterization on 5/23/23 and received three stents to the right coronary artery. You have residual blockages in the left anterior descending artery and Obtuse Marginal Artery for which you must return in 4-5 weeks to get more stents.  PLEASE CONTINUE ASPIRIN 81MG DAILY AND PLAVIX 75MG DAILY. DO NOT STOP THESE MEDICATIONS FOR ANY REASON AS THEY ARE KEEPING YOUR STENT OPEN AND PREVENTING A HEART ATTACK.      SECONDARY DISCHARGE DIAGNOSES  Diagnosis: HTN (hypertension)  Assessment and Plan of Treatment: Please continue Hydralazine 25mg twice a day, Amlodipine, and Metoprolol as prescribed to keep your blood pressure controlled. For blood pressure that is too high or too low please see your doctor or go to the emergency room as necessary.    Diagnosis: HLD (hyperlipidemia)  Assessment and Plan of Treatment: You were found to have high cholesterol with the "bad cholesterol", . Please start taking Lipitor 80mg at bedtime to keep your cholesterol low. High cholesterol contributes to heart disease.    Diagnosis: Encounter for smoking cessation counseling  Assessment and Plan of Treatment: If you smoke and already have heart or vascular disease, quitting smoking will reduce your risk of sudden cardiac death, heart attack, and death from other chronic diseases. Any amount of smoking, even light smoking or occasional smoking, damages the heart and blood vessels. One of the best ways to reduce your risk of heart disease is to avoid tobacco smoke. No matter how much or how long you've smoked, quitting will benefit you. We have provided you with resources to help with smoking cessation and we strongly recommend that you use the information provided and follow up with your outpatient physician to help with setting up a “quit date” if you have not already.

## 2023-05-24 NOTE — DISCHARGE NOTE PROVIDER - CARE PROVIDER_API CALL
Jovi Frausto  Cardiovascular Disease  [address], [address]  [place] [address]  Phone: [phone]  Fax: [phone]  Follow Up Time: 2 weeks

## 2023-05-25 ENCOUNTER — APPOINTMENT (OUTPATIENT)
Dept: CARE COORDINATION | Facility: HOME HEALTH | Age: 63
End: 2023-05-25
Payer: MEDICARE

## 2023-05-25 ENCOUNTER — TRANSCRIPTION ENCOUNTER (OUTPATIENT)
Age: 63
End: 2023-05-25

## 2023-05-25 DIAGNOSIS — I50.30 UNSPECIFIED DIASTOLIC (CONGESTIVE) HEART FAILURE: ICD-10-CM

## 2023-05-25 DIAGNOSIS — N18.9 CHRONIC KIDNEY DISEASE, UNSPECIFIED: ICD-10-CM

## 2023-05-25 DIAGNOSIS — I10 ESSENTIAL (PRIMARY) HYPERTENSION: ICD-10-CM

## 2023-05-25 DIAGNOSIS — E78.5 HYPERLIPIDEMIA, UNSPECIFIED: ICD-10-CM

## 2023-05-25 DIAGNOSIS — I25.10 ATHEROSCLEROTIC HEART DISEASE OF NATIVE CORONARY ARTERY W/OUT ANGINA PECTORIS: ICD-10-CM

## 2023-05-25 DIAGNOSIS — G45.9 TRANSIENT CEREBRAL ISCHEMIC ATTACK, UNSPECIFIED: ICD-10-CM

## 2023-05-25 PROCEDURE — 99495 TRANSJ CARE MGMT MOD F2F 14D: CPT | Mod: 95

## 2023-05-25 RX ORDER — ATORVASTATIN CALCIUM 10 MG/1
10 TABLET, FILM COATED ORAL
Refills: 0 | Status: COMPLETED | COMMUNITY
End: 2023-05-25

## 2023-05-25 RX ORDER — TRAZODONE HYDROCHLORIDE 100 MG/1
100 TABLET ORAL
Qty: 30 | Refills: 0 | Status: COMPLETED | COMMUNITY
Start: 2017-10-16 | End: 2023-05-25

## 2023-05-25 RX ORDER — ARIPIPRAZOLE 2 MG/1
2 TABLET ORAL
Refills: 0 | Status: ACTIVE | COMMUNITY

## 2023-05-25 RX ORDER — FLUOXETINE HCL 10 MG
TABLET ORAL
Refills: 0 | Status: COMPLETED | COMMUNITY
End: 2023-05-25

## 2023-05-25 RX ORDER — PREDNISONE 10 MG/1
10 TABLET ORAL
Qty: 33 | Refills: 0 | Status: COMPLETED | COMMUNITY
Start: 2017-11-16 | End: 2023-05-25

## 2023-05-25 RX ORDER — TRAMADOL HYDROCHLORIDE 50 MG/1
50 TABLET, COATED ORAL
Qty: 21 | Refills: 0 | Status: COMPLETED | COMMUNITY
Start: 2018-02-21 | End: 2023-05-25

## 2023-05-25 RX ORDER — NEBIVOLOL HYDROCHLORIDE 20 MG/1
TABLET ORAL
Refills: 0 | Status: COMPLETED | COMMUNITY
End: 2023-05-25

## 2023-05-25 RX ORDER — ASPIRIN 325 MG/1
325 TABLET, FILM COATED ORAL
Refills: 0 | Status: COMPLETED | COMMUNITY
End: 2023-05-25

## 2023-05-25 RX ORDER — TOPIRAMATE 50 MG/1
TABLET, COATED ORAL
Refills: 0 | Status: COMPLETED | COMMUNITY
End: 2023-05-25

## 2023-05-25 RX ORDER — CLOPIDOGREL 75 MG/1
75 TABLET, FILM COATED ORAL
Refills: 0 | Status: ACTIVE | COMMUNITY

## 2023-05-25 RX ORDER — QUETIAPINE 25 MG/1
25 TABLET, FILM COATED ORAL
Refills: 0 | Status: ACTIVE | COMMUNITY

## 2023-05-25 RX ORDER — HYDRALAZINE HYDROCHLORIDE 25 MG/1
25 TABLET ORAL
Refills: 0 | Status: ACTIVE | COMMUNITY

## 2023-05-25 RX ORDER — ASPIRIN 81 MG/1
81 TABLET ORAL
Refills: 0 | Status: ACTIVE | COMMUNITY

## 2023-05-25 RX ORDER — METOPROLOL SUCCINATE 25 MG/1
25 TABLET, EXTENDED RELEASE ORAL
Refills: 0 | Status: ACTIVE | COMMUNITY

## 2023-05-25 RX ORDER — LOSARTAN POTASSIUM 100 MG/1
TABLET, FILM COATED ORAL
Refills: 0 | Status: COMPLETED | COMMUNITY
End: 2023-05-25

## 2023-05-25 RX ORDER — LEVOFLOXACIN 500 MG/1
500 TABLET, FILM COATED ORAL
Qty: 7 | Refills: 0 | Status: COMPLETED | COMMUNITY
Start: 2017-11-16 | End: 2023-05-25

## 2023-05-25 RX ORDER — TOPIRAMATE 50 MG/1
50 TABLET, FILM COATED ORAL
Qty: 60 | Refills: 0 | Status: COMPLETED | COMMUNITY
Start: 2017-11-20 | End: 2023-05-25

## 2023-05-25 RX ORDER — ATORVASTATIN CALCIUM 80 MG/1
80 TABLET, FILM COATED ORAL
Refills: 0 | Status: ACTIVE | COMMUNITY

## 2023-05-25 RX ORDER — ROSUVASTATIN CALCIUM 5 MG/1
TABLET, FILM COATED ORAL
Refills: 0 | Status: COMPLETED | COMMUNITY
End: 2023-05-25

## 2023-05-25 RX ORDER — FLUOXETINE HYDROCHLORIDE 40 MG/1
40 CAPSULE ORAL
Qty: 30 | Refills: 0 | Status: COMPLETED | COMMUNITY
Start: 2017-10-16 | End: 2023-05-25

## 2023-05-25 RX ORDER — ALBUTEROL SULFATE 2.5 MG/3ML
(2.5 MG/3ML) SOLUTION RESPIRATORY (INHALATION)
Qty: 300 | Refills: 0 | Status: COMPLETED | COMMUNITY
Start: 2017-12-15 | End: 2023-05-25

## 2023-05-25 RX ORDER — BENAZEPRIL HYDROCHLORIDE 5 MG/1
5 TABLET ORAL
Refills: 0 | Status: COMPLETED | COMMUNITY
End: 2023-05-25

## 2023-05-25 RX ORDER — AZILSARTAN KAMEDOXOMIL AND CHLORTHALIDONE 40; 25 MG/1; MG/1
TABLET ORAL
Refills: 0 | Status: COMPLETED | COMMUNITY
End: 2023-05-25

## 2023-05-25 RX ORDER — HYDROCHLOROTHIAZIDE 50 MG
TABLET ORAL
Refills: 0 | Status: COMPLETED | COMMUNITY
End: 2023-05-25

## 2023-05-26 ENCOUNTER — TRANSCRIPTION ENCOUNTER (OUTPATIENT)
Age: 63
End: 2023-05-26

## 2023-05-26 NOTE — HISTORY OF PRESENT ILLNESS
[Home] : at home, [unfilled] , at the time of the visit. [Other Location: e.g. Home (Enter Location, City,State)___] : at [unfilled] [Verbal consent obtained from patient] : the patient, [unfilled] [FreeTextEntry1] : Follow-up for discharge from [facility] for CAD and HTN.\par  [de-identified] : Patient is a 62 year old female enrolled in the [employer] program with a history of active smoker (2 cigs/day), w/ FHx of CAD and PMHx of HTN, HLD, HFpEF (EF 63%), pre-DM, CKD, ALKA (non compliance w/ CPAP), TIA (2012), and chronic bronchitis. Patient was admitted from 5/23/23 - 5/24/23 to [facility] for a diagnosis of CAD and HTN. \par \par Hospital chart reviewed and copied as per [facility] Discharge Summary:\par "62F, active smoker (2 cigs/day), w/ FHx of CAD and PMHx of HTN, HLD, HFpEF (EF 63%), pre-DM, CKD, ALKA (non compliance w/ CPAP), TIA (2012), and chronic bronchitis, initially presented to their outpatient cardiologist Dr. Frausto for preop risk assessment for left knee arthroscopy. Per MD note, Echo 4/1/23: showed LVEF 63% w/ grade 1 DD. NST 5/17/23: moderate reversible perfusion defect of the inferior septal inferolateral wall most consistent with ischemia of the RCA versus left circumflex, EF 48% by nuclear. II light of pts risk factors, CCS class II anginal symptoms and abnormal NST, was referred to [facility] for recommended cardiac cath. Pt now s/p cardiac cath 5/23/23 w/ Dr Frausto: PAZ x 3 to p/m/d RCA; other vessels revealing LM mild, pLAD 70-75%, mLAD 70-75% (iFR + 0.85), LCx mild luminal, OM2 85-90%, ostial OM1 60%, access R radial artery access CDI w/o hematoma/oozing. Pt is to return for staged PCI of residual LAD and OM2 in 4-5 weeks. Pt will c/w ASA 81mg QD, Plavix 75mg QD. Elevated T Chol 270, . Pt reports non compliance w/ statin, now agrees to take Lipitor 40mg qd. Pt admitted to cardiac tele overnight for observation and telemetry monitoring. Pt seen and examined at bedside this AM without any complaints or events overnight, VSS, labs and telemetry reviewed and pt stable for discharge as discussed with Dr. Silvestre. Pt has received appropriate discharge instructions, including medication regimen, access site management and follow up with Dr. Frausto in 1-2 weeks. No cardiac rehab prescribed as pt requires staged PCI". \par \par Patient observed via tele health and is alert and oriented x 3, in no acute distress. Patient observed sitting comfortably upright in living room chair during time of visit. Patient states they are feeling well today. Patient states they are eating and drinking well. Patient reports compliance with medications, although states she needs to  refills from the pharmacy as she previously lost some of her medications. Patient reports mild fatigue but denies any chest pain or SOB. States she regularly checks her BP daily. Patient reports right radial cath site is c/d/i. States she has f/u with cardiology on 6/5/23. Denies any cough, fever, chills, abdominal pain, palpitations, nausea, vomiting, diarrhea, lightheadedness, dizziness, shortness of breath, or chest pain.\par \par Patient contacted by TCM RN within 48 hours of discharge and d/c instructions reviewed.  Discharge medications were reviewed and reconciled with the current medication list and medications in home. Patient had 48 hour follow up call done by SARAH Dawson on 5/25/23.\par \par Patient requesting and would benefit from [facility] services- suffers from multiple chronic conditions and could benefit from close home monitoring and assistance with ADL/IADLS- referral to be place toay. \par  No

## 2023-05-26 NOTE — PHYSICAL EXAM
[No Acute Distress] : no acute distress [Well Nourished] : well nourished [Well Developed] : well developed [Well-Appearing] : well-appearing [Normal Sclera/Conjunctiva] : normal sclera/conjunctiva [Normal Outer Ear/Nose] : the outer ears and nose were normal in appearance [No JVD] : no jugular venous distention [No Respiratory Distress] : no respiratory distress  [No Accessory Muscle Use] : no accessory muscle use [No Varicosities] : no varicosities [No Edema] : there was no peripheral edema [Non-distended] : non-distended [No Joint Swelling] : no joint swelling [No Rash] : no rash [Normal Gait] : normal gait [Normal Affect] : the affect was normal [Normal Insight/Judgement] : insight and judgment were intact [de-identified] : limited due to tele visit  [de-identified] : right radial cath site c/d/i

## 2023-05-26 NOTE — ADDENDUM
[FreeTextEntry1] : Patient requesting and would benefit from [facility] services- suffers from multiple chronic conditions and could benefit from close home monitoring and assistance with ADL/IADLS- referral to be place toay.

## 2023-05-26 NOTE — ASSESSMENT
[FreeTextEntry1] : Patient is a 62 year old female enrolled in the [employer] program with a history of active smoker (2 cigs/day), w/ FHx of CAD and PMHx of HTN, HLD, HFpEF (EF 63%), pre-DM, CKD, ALKA (non compliance w/ CPAP), TIA (2012), and chronic bronchitis. Patient was admitted from 5/23/23 - 5/24/23 to [facility] for a diagnosis of CAD and HTN. \par \par Hospital chart reviewed and copied as per [facility] Discharge Summary:\par "62F, active smoker (2 cigs/day), w/ FHx of CAD and PMHx of HTN, HLD, HFpEF (EF 63%), pre-DM, CKD, ALKA (non compliance w/ CPAP), TIA (2012), and chronic bronchitis, initially presented to their outpatient cardiologist Dr. Frausto for preop risk assessment for left knee arthroscopy. Per MD note, Echo 4/1/23: showed LVEF 63% w/ grade 1 DD. NST 5/17/23: moderate reversible perfusion defect of the inferior septal inferolateral wall most consistent with ischemia of the RCA versus left circumflex, EF 48% by nuclear. II light of pts risk factors, CCS class II anginal symptoms and abnormal NST, was referred to [facility] for recommended cardiac cath. Pt now s/p cardiac cath 5/23/23 w/ Dr Frausto: PAZ x 3 to p/m/d RCA; other vessels revealing LM mild, pLAD 70-75%, mLAD 70-75% (iFR + 0.85), LCx mild luminal, OM2 85-90%, ostial OM1 60%, access R radial artery access CDI w/o hematoma/oozing. Pt is to return for staged PCI of residual LAD and OM2 in 4-5 weeks. Pt will c/w ASA 81mg QD, Plavix 75mg QD. Elevated T Chol 270, . Pt reports non compliance w/ statin, now agrees to take Lipitor 40mg qd. Pt admitted to cardiac tele overnight for observation and telemetry monitoring. Pt seen and examined at bedside this AM without any complaints or events overnight, VSS, labs and telemetry reviewed and pt stable for discharge as discussed with Dr. Silvestre. Pt has received appropriate discharge instructions, including medication regimen, access site management and follow up with Dr. Frausto in 1-2 weeks. No cardiac rehab prescribed as pt requires staged PCI". \par \par Patient observed via tele health and is alert and oriented x 3, in no acute distress. Patient observed sitting comfortably upright in living room chair during time of visit. Patient states they are feeling well today. Patient states they are eating and drinking well. Patient reports compliance with medications, although states she needs to  refills from the pharmacy as she previously lost some of her medications. Patient reports mild fatigue but denies any chest pain or SOB. States she regularly checks her BP daily. Patient reports right radial cath site is c/d/i. States she has f/u with cardiology on 6/5/23. Denies any cough, fever, chills, abdominal pain, palpitations, nausea, vomiting, diarrhea, lightheadedness, dizziness, shortness of breath, or chest pain.\par \par Patient contacted by TCM RN within 48 hours of discharge and d/c instructions reviewed.  Discharge medications were reviewed and reconciled with the current medication list and medications in home. Patient had 48 hour follow up call done by SARAH Dawson on 5/25/23.\par

## 2023-05-26 NOTE — REVIEW OF SYSTEMS
[Fatigue] : fatigue [Negative] : ENT [Fever] : no fever [Chills] : no chills [Hot Flashes] : no hot flashes [Night Sweats] : no night sweats [Chest Pain] : no chest pain [Palpitations] : no palpitations [Leg Claudication] : no leg claudication [Lower Ext Edema] : no lower extremity edema [Orthopnea] : no orthopnea [Shortness Of Breath] : no shortness of breath [Wheezing] : no wheezing [Cough] : no cough [Dyspnea on Exertion] : no dyspnea on exertion [Abdominal Pain] : no abdominal pain [Nausea] : no nausea [Constipation] : no constipation [Vomiting] : no vomiting [Dysuria] : no dysuria [Incontinence] : no incontinence [Nocturia] : no nocturia [Hematuria] : no hematuria [Joint Pain] : no joint pain [Joint Stiffness] : no joint stiffness [Muscle Pain] : no muscle pain [Itching] : no itching [Mole Changes] : no mole changes [Skin Rash] : no skin rash [Headache] : no headache [Dizziness] : no dizziness [Fainting] : no fainting [Memory Loss] : no memory loss [Suicidal] : not suicidal [Insomnia] : no insomnia [Anxiety] : no anxiety [Easy Bleeding] : no easy bleeding [Easy Bruising] : no easy bruising [Swollen Glands] : no swollen glands

## 2023-05-26 NOTE — PLAN
[FreeTextEntry1] : -CV and pulmonary status stable\par -Patient advised to continue with medication regimen as directed \par -Medication education discussed in full detail with + teach back. \par -Encouraged verbalization of fears and concerns. \par -Report all symptoms not relieved by rest or medication\par -Educated on monitoring blood pressure\par -Maintain Balanced diet \par -Exercise as appropriate\par -Patient educated on s/s of when to call medical providers with + teach back. \par -Reminded of NP role and advised to call with any questions/concerns. \par -Follow up with medical providers as scheduled\par \par - Patient verbalized understanding of plan above, advised to call call TCM Team or CCC with any questions or concerns.\par \par Patient requesting and would benefit from [facility] services- suffers from multiple chronic conditions and could benefit from close home monitoring and assistance with ADL/IADLS- referral to be place toay.

## 2023-05-30 ENCOUNTER — FORM ENCOUNTER (OUTPATIENT)
Age: 63
End: 2023-05-30

## 2023-05-30 DIAGNOSIS — K44.9 DIAPHRAGMATIC HERNIA WITHOUT OBSTRUCTION OR GANGRENE: ICD-10-CM

## 2023-05-30 DIAGNOSIS — E78.5 HYPERLIPIDEMIA, UNSPECIFIED: ICD-10-CM

## 2023-05-30 DIAGNOSIS — Z82.49 FAMILY HISTORY OF ISCHEMIC HEART DISEASE AND OTHER DISEASES OF THE CIRCULATORY SYSTEM: ICD-10-CM

## 2023-05-30 DIAGNOSIS — Z96.651 PRESENCE OF RIGHT ARTIFICIAL KNEE JOINT: ICD-10-CM

## 2023-05-30 DIAGNOSIS — Z79.82 LONG TERM (CURRENT) USE OF ASPIRIN: ICD-10-CM

## 2023-05-30 DIAGNOSIS — E66.01 MORBID (SEVERE) OBESITY DUE TO EXCESS CALORIES: ICD-10-CM

## 2023-05-30 DIAGNOSIS — J42 UNSPECIFIED CHRONIC BRONCHITIS: ICD-10-CM

## 2023-05-30 DIAGNOSIS — Z79.899 OTHER LONG TERM (CURRENT) DRUG THERAPY: ICD-10-CM

## 2023-05-30 DIAGNOSIS — G47.33 OBSTRUCTIVE SLEEP APNEA (ADULT) (PEDIATRIC): ICD-10-CM

## 2023-05-30 DIAGNOSIS — I25.118 ATHEROSCLEROTIC HEART DISEASE OF NATIVE CORONARY ARTERY WITH OTHER FORMS OF ANGINA PECTORIS: ICD-10-CM

## 2023-05-30 DIAGNOSIS — F17.210 NICOTINE DEPENDENCE, CIGARETTES, UNCOMPLICATED: ICD-10-CM

## 2023-05-30 DIAGNOSIS — I13.0 HYPERTENSIVE HEART AND CHRONIC KIDNEY DISEASE WITH HEART FAILURE AND STAGE 1 THROUGH STAGE 4 CHRONIC KIDNEY DISEASE, OR UNSPECIFIED CHRONIC KIDNEY DISEASE: ICD-10-CM

## 2023-05-30 DIAGNOSIS — R73.03 PREDIABETES: ICD-10-CM

## 2023-05-30 DIAGNOSIS — Z86.73 PERSONAL HISTORY OF TRANSIENT ISCHEMIC ATTACK (TIA), AND CEREBRAL INFARCTION WITHOUT RESIDUAL DEFICITS: ICD-10-CM

## 2023-05-30 DIAGNOSIS — Z90.711 ACQUIRED ABSENCE OF UTERUS WITH REMAINING CERVICAL STUMP: ICD-10-CM

## 2023-05-30 DIAGNOSIS — I50.32 CHRONIC DIASTOLIC (CONGESTIVE) HEART FAILURE: ICD-10-CM

## 2023-05-30 DIAGNOSIS — Z91.199 PATIENT'S NONCOMPLIANCE WITH OTHER MEDICAL TREATMENT AND REGIMEN DUE TO UNSPECIFIED REASON: ICD-10-CM

## 2023-05-30 DIAGNOSIS — N18.9 CHRONIC KIDNEY DISEASE, UNSPECIFIED: ICD-10-CM

## 2023-05-31 ENCOUNTER — TRANSCRIPTION ENCOUNTER (OUTPATIENT)
Age: 63
End: 2023-05-31

## 2023-06-08 ENCOUNTER — TRANSCRIPTION ENCOUNTER (OUTPATIENT)
Age: 63
End: 2023-06-08

## 2023-06-12 ENCOUNTER — TRANSCRIPTION ENCOUNTER (OUTPATIENT)
Age: 63
End: 2023-06-12

## 2023-06-15 ENCOUNTER — TRANSCRIPTION ENCOUNTER (OUTPATIENT)
Age: 63
End: 2023-06-15

## 2023-08-04 VITALS
HEART RATE: 66 BPM | TEMPERATURE: 97 F | OXYGEN SATURATION: 98 % | SYSTOLIC BLOOD PRESSURE: 216 MMHG | RESPIRATION RATE: 18 BRPM | DIASTOLIC BLOOD PRESSURE: 82 MMHG

## 2023-08-04 NOTE — H&P ADULT - ASSESSMENT
62F, current smoker, w/ PMHx Obesity, HTN, HLD, HFpEF (EF 63%), pre-DM, CKD, ALKA (non compliance w/ CPAP), TIA (2012), and chronic bronchitis, known CAD (s/p recent cardiac cath 5/23/23 w/ Dr Frausto: PAZ x 3 to p/m/d RCA with residual pLAD 70-75%, mLAD 70-75% (iFR + 0.85), OM2 85-90%, ostial OM1 60%) and now returns for staged PCI with Dr Oh.         ASA:  III  Mallampati class: II	  Anginal Class: N/A    Allergy Status: NKDA/NKFA  EKG: NSR @ 66bpm, no acute ischemic changes  LOAD:  H/H WNL.  Pt denies BRBPR, hematuria, hematochezia, melena. Pt reloaded with ASA 325mg x1 dose given none compliance.  Home maintenance dose Plavix 75mg PO x 1 dose  LABS:  Reviewed:  BUN/Cr : WNL.  EF preserved.  Euvolemic on exam. Initial given NS 250cc bolus, however, no further hydration given 2/2 elevated BP.   Potassium 3.7, repleted with KDur 40 meq PO x1  Of note:  Elevated /82, likely stress induced. Hydra 10mg IV x 1 dose.  Repeat BP**********.   No additional interventions given pt will be given antihypertensives intraprocedure.     Sedation Plan:  Moderate    Patient Is Suitable Candidate For Sedation: Yes        Risks & benefits of procedure and sedation and risks and benefits for the alternative therapy have been explained to the patient in layman’s terms including but not limited to: allergic reaction, bleeding, infection, arrhythmia, respiratory compromise, renal and vascular compromise, limb damage, MI, CVA, emergent CABG/Vascular Surgery and death. Informed consent obtained and in chart.   62F, current smoker, w/ PMHx Obesity, HTN, HLD, HFpEF (EF 63%), pre-DM, CKD, ALKA (non compliance w/ CPAP), TIA (2012), and chronic bronchitis, known CAD (s/p recent cardiac cath 5/23/23 w/ Dr Frausto: PAZ x 3 to p/m/d RCA with residual pLAD 70-75%, mLAD 70-75% (iFR + 0.85), OM2 85-90%, ostial OM1 60%) and now returns for staged PCI with Dr Oh.         ASA:  III  Mallampati class: II	  Anginal Class: N/A    Allergy Status: NKDA/NKFA  EKG: NSR @ 66bpm, no acute ischemic changes  LOAD:  H/H WNL.  Pt denies BRBPR, hematuria, hematochezia, melena. Pt reloaded with ASA 325mg x1 dose given none compliance.  Home maintenance dose Plavix 75mg PO x 1 dose  LABS:  Reviewed:  BUN/Cr : WNL.  EF preserved.  Euvolemic on exam. Initial given NS 250cc bolus, however, no further hydration given 2/2 elevated BP.   Potassium 3.7, repleted with KDur 40 meq PO x1  Of note:  Elevated /82, likely stress induced. Hydra 10mg IV x 1 dose.  Repeat /84.   No additional interventions given pt will be given antihypertensives intra-procedure.     Sedation Plan:  Moderate    Patient Is Suitable Candidate For Sedation: Yes        Risks & benefits of procedure and sedation and risks and benefits for the alternative therapy have been explained to the patient in layman’s terms including but not limited to: allergic reaction, bleeding, infection, arrhythmia, respiratory compromise, renal and vascular compromise, limb damage, MI, CVA, emergent CABG/Vascular Surgery and death. Informed consent obtained and in chart.

## 2023-08-04 NOTE — H&P ADULT - HISTORY OF PRESENT ILLNESS
Cardio: Dr. Frausto  Pharmacy:  Escort:    62F, current smoker, w/ PMHx Obesity, HTN, HLD, HFpEF (EF 63%), pre-DM, CKD, ALKA (non compliance w/ CPAP), TIA (2012), and chronic bronchitis, known CAD (s/p recent cardiac cath 5/23/23 w/ Dr Frausto: PAZ x 3 to p/m/d RCA with residual pLAD 70-75%, mLAD 70-75% (iFR + 0.85), OM2 85-90%, ostial OM1 60%) and now returns for staged PCI. Pt initially presented her outpatient cardiologist Dr. Frausto for preop risk assessment for left knee arthroscopy and was found with subsequent abnormal NST 5/17/23: moderate reversible perfusion defect of the inferior septal inferolateral wall most consistent with ischemia of the RCA versus left circumflex, EF 48% by nuclear.  Echo 4/1/23: showed LVEF 63% w/ grade 1 DD. Pt denies_____ active CP, SOB, palpitations, diaphoresis, orthopnea, PND, dizziness, syncope, abdominal pain/discomfort, LE swelling or any other complaints at this time.  Pt endorses compliance with DAPT.  Denies any recent hospitalizations or surgeries since she was discharged.     In light of the pt's risk factors, recent LHC showing residual CAD, pt now presents for staged PCI with Dr Frausto.  Cardio: Dr. Frausto  Pharmacy:  [place] [address]  Escort: None, staged PCI    62F, current smoker, w/ PMHx Obesity, HTN, HLD, HFpEF (EF 63%), pre-DM, CKD, ALKA (non compliance w/ CPAP), TIA (2012), and chronic bronchitis, known CAD (s/p recent cardiac cath 5/23/23 w/ Dr Frausto: PAZ x 3 to p/m/d RCA with residual pLAD 70-75%, mLAD 70-75% (iFR + 0.85), OM2 85-90%, ostial OM1 60%) and now returns for staged PCI. Pt initially presented her outpatient cardiologist Dr. Frausto for preop risk assessment for left knee arthroscopy and was found with subsequent abnormal NST 5/17/23: moderate reversible perfusion defect of the inferior septal inferolateral wall most consistent with ischemia of the RCA versus left circumflex, EF 48% by nuclear.  Echo 4/1/23: showed LVEF 63% w/ grade 1 DD. Pt denies active CP, SOB, palpitations, diaphoresis, orthopnea, PND, dizziness, syncope, abdominal pain/discomfort, LE swelling or any other complaints at this time.  Pt endorses some compliance with DAP (states she takes her Plavix but sometimes forget ASA).  Denies any recent hospitalizations or surgeries since she was discharged.     In light of the pt's risk factors, recent LHC showing residual CAD, pt now presents for staged PCI with Dr Frausto.

## 2023-08-04 NOTE — H&P ADULT - NSHPLABSRESULTS_GEN_ALL_CORE
13.0   6.37  )-----------( 308      ( 10 Aug 2023 08:21 )             42.1         PT/INR - ( 10 Aug 2023 08:21 )   PT: 11.2 sec;   INR: 0.98          PTT - ( 10 Aug 2023 08:21 )  PTT:39.2 sec

## 2023-08-10 ENCOUNTER — INPATIENT (INPATIENT)
Facility: HOSPITAL | Age: 63
LOS: 0 days | Discharge: HOME | DRG: 247 | End: 2023-08-11
Attending: STUDENT IN AN ORGANIZED HEALTH CARE EDUCATION/TRAINING PROGRAM | Admitting: STUDENT IN AN ORGANIZED HEALTH CARE EDUCATION/TRAINING PROGRAM
Payer: MEDICARE

## 2023-08-10 DIAGNOSIS — Z98.890 OTHER SPECIFIED POSTPROCEDURAL STATES: Chronic | ICD-10-CM

## 2023-08-10 DIAGNOSIS — Z98.89 OTHER SPECIFIED POSTPROCEDURAL STATES: Chronic | ICD-10-CM

## 2023-08-10 DIAGNOSIS — Z98.41 CATARACT EXTRACTION STATUS, RIGHT EYE: Chronic | ICD-10-CM

## 2023-08-10 DIAGNOSIS — H33.21 SEROUS RETINAL DETACHMENT, RIGHT EYE: Chronic | ICD-10-CM

## 2023-08-10 LAB
A1C WITH ESTIMATED AVERAGE GLUCOSE RESULT: 6.1 % — HIGH (ref 4–5.6)
ALBUMIN SERPL ELPH-MCNC: 3.9 G/DL — SIGNIFICANT CHANGE UP (ref 3.3–5)
ALP SERPL-CCNC: 110 U/L — SIGNIFICANT CHANGE UP (ref 40–120)
ALT FLD-CCNC: 7 U/L — LOW (ref 10–45)
ANION GAP SERPL CALC-SCNC: 9 MMOL/L — SIGNIFICANT CHANGE UP (ref 5–17)
APTT BLD: 39.2 SEC — HIGH (ref 24.5–35.6)
AST SERPL-CCNC: 10 U/L — SIGNIFICANT CHANGE UP (ref 10–40)
BASOPHILS # BLD AUTO: 0.03 K/UL — SIGNIFICANT CHANGE UP (ref 0–0.2)
BASOPHILS NFR BLD AUTO: 0.5 % — SIGNIFICANT CHANGE UP (ref 0–2)
BILIRUB SERPL-MCNC: 0.3 MG/DL — SIGNIFICANT CHANGE UP (ref 0.2–1.2)
BUN SERPL-MCNC: 21 MG/DL — SIGNIFICANT CHANGE UP (ref 7–23)
CALCIUM SERPL-MCNC: 9.6 MG/DL — SIGNIFICANT CHANGE UP (ref 8.4–10.5)
CHLORIDE SERPL-SCNC: 101 MMOL/L — SIGNIFICANT CHANGE UP (ref 96–108)
CHOLEST SERPL-MCNC: 201 MG/DL — HIGH
CK MB CFR SERPL CALC: 1.3 NG/ML — SIGNIFICANT CHANGE UP (ref 0–6.7)
CK SERPL-CCNC: 58 U/L — SIGNIFICANT CHANGE UP (ref 25–170)
CO2 SERPL-SCNC: 27 MMOL/L — SIGNIFICANT CHANGE UP (ref 22–31)
CREAT SERPL-MCNC: 1.03 MG/DL — SIGNIFICANT CHANGE UP (ref 0.5–1.3)
EGFR: 61 ML/MIN/1.73M2 — SIGNIFICANT CHANGE UP
EOSINOPHIL # BLD AUTO: 0.29 K/UL — SIGNIFICANT CHANGE UP (ref 0–0.5)
EOSINOPHIL NFR BLD AUTO: 4.6 % — SIGNIFICANT CHANGE UP (ref 0–6)
ESTIMATED AVERAGE GLUCOSE: 128 MG/DL — HIGH (ref 68–114)
GLUCOSE SERPL-MCNC: 122 MG/DL — HIGH (ref 70–99)
HCT VFR BLD CALC: 42.1 % — SIGNIFICANT CHANGE UP (ref 34.5–45)
HDLC SERPL-MCNC: 48 MG/DL — LOW
HGB BLD-MCNC: 13 G/DL — SIGNIFICANT CHANGE UP (ref 11.5–15.5)
IMM GRANULOCYTES NFR BLD AUTO: 0.5 % — SIGNIFICANT CHANGE UP (ref 0–0.9)
INR BLD: 0.98 — SIGNIFICANT CHANGE UP (ref 0.85–1.18)
ISTAT ACTK (ACTIVATED CLOTTING TIME KAOLIN): 191 SEC — HIGH (ref 74–137)
LIPID PNL WITH DIRECT LDL SERPL: 135 MG/DL — HIGH
LYMPHOCYTES # BLD AUTO: 1.18 K/UL — SIGNIFICANT CHANGE UP (ref 1–3.3)
LYMPHOCYTES # BLD AUTO: 18.5 % — SIGNIFICANT CHANGE UP (ref 13–44)
MAGNESIUM SERPL-MCNC: 2 MG/DL — SIGNIFICANT CHANGE UP (ref 1.6–2.6)
MCHC RBC-ENTMCNC: 26.6 PG — LOW (ref 27–34)
MCHC RBC-ENTMCNC: 30.9 GM/DL — LOW (ref 32–36)
MCV RBC AUTO: 86.1 FL — SIGNIFICANT CHANGE UP (ref 80–100)
MONOCYTES # BLD AUTO: 0.53 K/UL — SIGNIFICANT CHANGE UP (ref 0–0.9)
MONOCYTES NFR BLD AUTO: 8.3 % — SIGNIFICANT CHANGE UP (ref 2–14)
NEUTROPHILS # BLD AUTO: 4.31 K/UL — SIGNIFICANT CHANGE UP (ref 1.8–7.4)
NEUTROPHILS NFR BLD AUTO: 67.6 % — SIGNIFICANT CHANGE UP (ref 43–77)
NON HDL CHOLESTEROL: 153 MG/DL — HIGH
NRBC # BLD: 0 /100 WBCS — SIGNIFICANT CHANGE UP (ref 0–0)
PLATELET # BLD AUTO: 308 K/UL — SIGNIFICANT CHANGE UP (ref 150–400)
POTASSIUM SERPL-MCNC: 3.2 MMOL/L — LOW (ref 3.5–5.3)
POTASSIUM SERPL-SCNC: 3.2 MMOL/L — LOW (ref 3.5–5.3)
PROT SERPL-MCNC: 7.3 G/DL — SIGNIFICANT CHANGE UP (ref 6–8.3)
PROTHROM AB SERPL-ACNC: 11.2 SEC — SIGNIFICANT CHANGE UP (ref 9.5–13)
RBC # BLD: 4.89 M/UL — SIGNIFICANT CHANGE UP (ref 3.8–5.2)
RBC # FLD: 15.1 % — HIGH (ref 10.3–14.5)
SODIUM SERPL-SCNC: 137 MMOL/L — SIGNIFICANT CHANGE UP (ref 135–145)
TRIGL SERPL-MCNC: 90 MG/DL — SIGNIFICANT CHANGE UP
WBC # BLD: 6.37 K/UL — SIGNIFICANT CHANGE UP (ref 3.8–10.5)
WBC # FLD AUTO: 6.37 K/UL — SIGNIFICANT CHANGE UP (ref 3.8–10.5)

## 2023-08-10 PROCEDURE — 92978 ENDOLUMINL IVUS OCT C 1ST: CPT | Mod: 26,LC

## 2023-08-10 PROCEDURE — 92929: CPT | Mod: LC

## 2023-08-10 PROCEDURE — 93571 IV DOP VEL&/PRESS C FLO 1ST: CPT | Mod: 26,52,LD

## 2023-08-10 PROCEDURE — 92928 PRQ TCAT PLMT NTRAC ST 1 LES: CPT | Mod: LC

## 2023-08-10 PROCEDURE — 93010 ELECTROCARDIOGRAM REPORT: CPT

## 2023-08-10 PROCEDURE — 99152 MOD SED SAME PHYS/QHP 5/>YRS: CPT

## 2023-08-10 RX ORDER — CHLORHEXIDINE GLUCONATE 213 G/1000ML
1 SOLUTION TOPICAL ONCE
Refills: 0 | Status: DISCONTINUED | OUTPATIENT
Start: 2023-08-10 | End: 2023-08-10

## 2023-08-10 RX ORDER — NITROGLYCERIN 6.5 MG
40 CAPSULE, EXTENDED RELEASE ORAL
Qty: 50 | Refills: 0 | Status: DISCONTINUED | OUTPATIENT
Start: 2023-08-10 | End: 2023-08-10

## 2023-08-10 RX ORDER — SODIUM CHLORIDE 9 MG/ML
250 INJECTION INTRAMUSCULAR; INTRAVENOUS; SUBCUTANEOUS ONCE
Refills: 0 | Status: COMPLETED | OUTPATIENT
Start: 2023-08-10 | End: 2023-08-10

## 2023-08-10 RX ORDER — SODIUM CHLORIDE 9 MG/ML
1000 INJECTION INTRAMUSCULAR; INTRAVENOUS; SUBCUTANEOUS
Refills: 0 | Status: DISCONTINUED | OUTPATIENT
Start: 2023-08-10 | End: 2023-08-10

## 2023-08-10 RX ORDER — ATORVASTATIN CALCIUM 80 MG/1
80 TABLET, FILM COATED ORAL AT BEDTIME
Refills: 0 | Status: DISCONTINUED | OUTPATIENT
Start: 2023-08-10 | End: 2023-08-11

## 2023-08-10 RX ORDER — CHOLECALCIFEROL (VITAMIN D3) 125 MCG
1000 CAPSULE ORAL DAILY
Refills: 0 | Status: DISCONTINUED | OUTPATIENT
Start: 2023-08-10 | End: 2023-08-11

## 2023-08-10 RX ORDER — POTASSIUM CHLORIDE 20 MEQ
40 PACKET (EA) ORAL ONCE
Refills: 0 | Status: COMPLETED | OUTPATIENT
Start: 2023-08-10 | End: 2023-08-10

## 2023-08-10 RX ORDER — MORPHINE SULFATE 50 MG/1
1 CAPSULE, EXTENDED RELEASE ORAL ONCE
Refills: 0 | Status: DISCONTINUED | OUTPATIENT
Start: 2023-08-10 | End: 2023-08-10

## 2023-08-10 RX ORDER — MORPHINE SULFATE 50 MG/1
2 CAPSULE, EXTENDED RELEASE ORAL ONCE
Refills: 0 | Status: DISCONTINUED | OUTPATIENT
Start: 2023-08-10 | End: 2023-08-10

## 2023-08-10 RX ORDER — CARVEDILOL PHOSPHATE 80 MG/1
6.25 CAPSULE, EXTENDED RELEASE ORAL EVERY 12 HOURS
Refills: 0 | Status: DISCONTINUED | OUTPATIENT
Start: 2023-08-10 | End: 2023-08-11

## 2023-08-10 RX ORDER — SODIUM CHLORIDE 9 MG/ML
500 INJECTION INTRAMUSCULAR; INTRAVENOUS; SUBCUTANEOUS
Refills: 0 | Status: DISCONTINUED | OUTPATIENT
Start: 2023-08-10 | End: 2023-08-10

## 2023-08-10 RX ORDER — TICAGRELOR 90 MG/1
1 TABLET ORAL
Qty: 60 | Refills: 0
Start: 2023-08-10 | End: 2023-09-08

## 2023-08-10 RX ORDER — QUETIAPINE FUMARATE 200 MG/1
25 TABLET, FILM COATED ORAL DAILY
Refills: 0 | Status: DISCONTINUED | OUTPATIENT
Start: 2023-08-10 | End: 2023-08-11

## 2023-08-10 RX ORDER — CARVEDILOL PHOSPHATE 80 MG/1
6.25 CAPSULE, EXTENDED RELEASE ORAL ONCE
Refills: 0 | Status: COMPLETED | OUTPATIENT
Start: 2023-08-10 | End: 2023-08-10

## 2023-08-10 RX ORDER — ISOSORBIDE MONONITRATE 60 MG/1
30 TABLET, EXTENDED RELEASE ORAL DAILY
Refills: 0 | Status: DISCONTINUED | OUTPATIENT
Start: 2023-08-10 | End: 2023-08-11

## 2023-08-10 RX ORDER — NICOTINE POLACRILEX 2 MG
1 GUM BUCCAL DAILY
Refills: 0 | Status: DISCONTINUED | OUTPATIENT
Start: 2023-08-10 | End: 2023-08-11

## 2023-08-10 RX ORDER — PREGABALIN 225 MG/1
1000 CAPSULE ORAL DAILY
Refills: 0 | Status: DISCONTINUED | OUTPATIENT
Start: 2023-08-10 | End: 2023-08-11

## 2023-08-10 RX ORDER — CLOPIDOGREL BISULFATE 75 MG/1
75 TABLET, FILM COATED ORAL ONCE
Refills: 0 | Status: COMPLETED | OUTPATIENT
Start: 2023-08-10 | End: 2023-08-10

## 2023-08-10 RX ORDER — NITROGLYCERIN 6.5 MG
1 CAPSULE, EXTENDED RELEASE ORAL ONCE
Refills: 0 | Status: COMPLETED | OUTPATIENT
Start: 2023-08-10 | End: 2023-08-10

## 2023-08-10 RX ORDER — AMLODIPINE BESYLATE 2.5 MG/1
10 TABLET ORAL DAILY
Refills: 0 | Status: DISCONTINUED | OUTPATIENT
Start: 2023-08-10 | End: 2023-08-10

## 2023-08-10 RX ORDER — TICAGRELOR 90 MG/1
180 TABLET ORAL ONCE
Refills: 0 | Status: DISCONTINUED | OUTPATIENT
Start: 2023-08-10 | End: 2023-08-10

## 2023-08-10 RX ORDER — NIFEDIPINE 30 MG
60 TABLET, EXTENDED RELEASE 24 HR ORAL DAILY
Refills: 0 | Status: DISCONTINUED | OUTPATIENT
Start: 2023-08-10 | End: 2023-08-11

## 2023-08-10 RX ORDER — FOLIC ACID 0.8 MG
1 TABLET ORAL DAILY
Refills: 0 | Status: DISCONTINUED | OUTPATIENT
Start: 2023-08-10 | End: 2023-08-11

## 2023-08-10 RX ORDER — ASPIRIN/CALCIUM CARB/MAGNESIUM 324 MG
81 TABLET ORAL DAILY
Refills: 0 | Status: DISCONTINUED | OUTPATIENT
Start: 2023-08-11 | End: 2023-08-11

## 2023-08-10 RX ORDER — FLUOXETINE HCL 10 MG
40 CAPSULE ORAL DAILY
Refills: 0 | Status: DISCONTINUED | OUTPATIENT
Start: 2023-08-10 | End: 2023-08-11

## 2023-08-10 RX ORDER — NIFEDIPINE 30 MG
30 TABLET, EXTENDED RELEASE 24 HR ORAL ONCE
Refills: 0 | Status: COMPLETED | OUTPATIENT
Start: 2023-08-10 | End: 2023-08-10

## 2023-08-10 RX ORDER — TICAGRELOR 90 MG/1
90 TABLET ORAL EVERY 12 HOURS
Refills: 0 | Status: DISCONTINUED | OUTPATIENT
Start: 2023-08-11 | End: 2023-08-11

## 2023-08-10 RX ORDER — HYDRALAZINE HCL 50 MG
10 TABLET ORAL ONCE
Refills: 0 | Status: COMPLETED | OUTPATIENT
Start: 2023-08-10 | End: 2023-08-10

## 2023-08-10 RX ORDER — ASPIRIN/CALCIUM CARB/MAGNESIUM 324 MG
325 TABLET ORAL ONCE
Refills: 0 | Status: COMPLETED | OUTPATIENT
Start: 2023-08-10 | End: 2023-08-10

## 2023-08-10 RX ADMIN — ISOSORBIDE MONONITRATE 30 MILLIGRAM(S): 60 TABLET, EXTENDED RELEASE ORAL at 18:35

## 2023-08-10 RX ADMIN — MORPHINE SULFATE 2 MILLIGRAM(S): 50 CAPSULE, EXTENDED RELEASE ORAL at 18:17

## 2023-08-10 RX ADMIN — SODIUM CHLORIDE 75 MILLILITER(S): 9 INJECTION INTRAMUSCULAR; INTRAVENOUS; SUBCUTANEOUS at 09:25

## 2023-08-10 RX ADMIN — Medication 30 MILLIGRAM(S): at 22:36

## 2023-08-10 RX ADMIN — MORPHINE SULFATE 2 MILLIGRAM(S): 50 CAPSULE, EXTENDED RELEASE ORAL at 18:07

## 2023-08-10 RX ADMIN — CARVEDILOL PHOSPHATE 6.25 MILLIGRAM(S): 80 CAPSULE, EXTENDED RELEASE ORAL at 22:34

## 2023-08-10 RX ADMIN — Medication 10 MILLIGRAM(S): at 09:40

## 2023-08-10 RX ADMIN — SODIUM CHLORIDE 125 MILLILITER(S): 9 INJECTION INTRAMUSCULAR; INTRAVENOUS; SUBCUTANEOUS at 17:27

## 2023-08-10 RX ADMIN — SODIUM CHLORIDE 500 MILLILITER(S): 9 INJECTION INTRAMUSCULAR; INTRAVENOUS; SUBCUTANEOUS at 09:25

## 2023-08-10 RX ADMIN — CARVEDILOL PHOSPHATE 6.25 MILLIGRAM(S): 80 CAPSULE, EXTENDED RELEASE ORAL at 16:46

## 2023-08-10 RX ADMIN — Medication 12 MICROGRAM(S)/MIN: at 22:20

## 2023-08-10 RX ADMIN — Medication 325 MILLIGRAM(S): at 09:24

## 2023-08-10 RX ADMIN — Medication 40 MILLIEQUIVALENT(S): at 09:25

## 2023-08-10 RX ADMIN — Medication 60 MILLIGRAM(S): at 17:27

## 2023-08-10 RX ADMIN — MORPHINE SULFATE 1 MILLIGRAM(S): 50 CAPSULE, EXTENDED RELEASE ORAL at 21:12

## 2023-08-10 RX ADMIN — CLOPIDOGREL BISULFATE 75 MILLIGRAM(S): 75 TABLET, FILM COATED ORAL at 09:25

## 2023-08-10 NOTE — CHART NOTE - NSCHARTNOTEFT_GEN_A_CORE
- of note: around 10 pm; pt given additional coreg 6.25 mg PO X 1, Nifedipine 30 mg PO x 1 and Morphine 1 mg IV x 1 in cath lab; evaluated by CCU fellow Bi in cath lab and nitro gtt turned off with plan to send patient to 5 ur,    Pt seen on arrival to 5 ur off nitro gtt around 11 am. At 5 ur, /97 HR 80s RR 16, 02 98 RA, pt laying comfortably in bed, does endorse 2/10 midsternal; non-radiating CP (improving from cath lab as per patient- pt with ongoing CP post procedure; Dr. Clemente aware). 12 lead ekg done- unchanged from prior EKG. pt ordered for nitro patch.
Interventional Cardiology PA Event Note;    Pt s/p Cath LM mild dz, pLAD 60-65% iFR 0.94, mLCx 85-90% calcified at bifurcation of OM1, dLCx 90%, ostial OM1 90%. Successful PCI PAZ dLCx and OM1 with PTCA mid LCx.  Rt Radial TR 6pm.  Change Plavix to Brilinta per Dr Oh.  -BP high pre-procedure 2/2 did not take hoome meds and pt was given Hydralazine 10mg IV x 1.  Intraprocedure: elevated SBP 240s with CP during PCI --> required IV Labetalol 10mg x1, IV Hydralazine 10mg x 1, Furosemide 40mg IV x 1, and NTG gtt at 40mcg/min with improvement.  CCU was consulted, no need for CCU admit given BP improved to 180's-190s on drip. Meds adjusted, Carvedilol 6.25mg BID and Nifedipine 60mg daily started per Dr Marilyn gutiérrez.  DC'd Metoprolol Succinate and Hydralazine.      In holding, pt still with CP ranging 4-5/10 with BP ranging 160-180s.  Around 5:45pm pt reported increasing CP 7/10, repeat /90.  No ECG changes. Morphine 2mg IV given. D/W Dr Oh, recommend adding Imdur 30mg Daily to regimen and continue to monitor.
Interventional Cardiology PA Event Note    Pt reassessed frequently since prior event and assessed with Dr Clemente again at 7:40pm. Pt endorses improvement of CP now 2/10.  SBP remains 150-180, HR 80's.  NTG gtt now at 30mcg/min.  Goal SBP for tonight 140-160 and wean off NTG drip.  If pt still requiring NTG drip at 10pm, can give additional Carvedilol 6.25mg x1 and increase dose to 12.5mg BID starting tomorrow.

## 2023-08-11 ENCOUNTER — TRANSCRIPTION ENCOUNTER (OUTPATIENT)
Age: 63
End: 2023-08-11

## 2023-08-11 VITALS — DIASTOLIC BLOOD PRESSURE: 53 MMHG | HEART RATE: 77 BPM | SYSTOLIC BLOOD PRESSURE: 114 MMHG

## 2023-08-11 LAB
ANION GAP SERPL CALC-SCNC: 11 MMOL/L — SIGNIFICANT CHANGE UP (ref 5–17)
BUN SERPL-MCNC: 17 MG/DL — SIGNIFICANT CHANGE UP (ref 7–23)
CALCIUM SERPL-MCNC: 8.5 MG/DL — SIGNIFICANT CHANGE UP (ref 8.4–10.5)
CHLORIDE SERPL-SCNC: 105 MMOL/L — SIGNIFICANT CHANGE UP (ref 96–108)
CO2 SERPL-SCNC: 21 MMOL/L — LOW (ref 22–31)
CREAT SERPL-MCNC: 0.86 MG/DL — SIGNIFICANT CHANGE UP (ref 0.5–1.3)
EGFR: 76 ML/MIN/1.73M2 — SIGNIFICANT CHANGE UP
GLUCOSE SERPL-MCNC: 128 MG/DL — HIGH (ref 70–99)
HCT VFR BLD CALC: 37.8 % — SIGNIFICANT CHANGE UP (ref 34.5–45)
HGB BLD-MCNC: 11.8 G/DL — SIGNIFICANT CHANGE UP (ref 11.5–15.5)
MAGNESIUM SERPL-MCNC: 1.9 MG/DL — SIGNIFICANT CHANGE UP (ref 1.6–2.6)
MCHC RBC-ENTMCNC: 27 PG — SIGNIFICANT CHANGE UP (ref 27–34)
MCHC RBC-ENTMCNC: 31.2 GM/DL — LOW (ref 32–36)
MCV RBC AUTO: 86.5 FL — SIGNIFICANT CHANGE UP (ref 80–100)
NRBC # BLD: 0 /100 WBCS — SIGNIFICANT CHANGE UP (ref 0–0)
PLATELET # BLD AUTO: 289 K/UL — SIGNIFICANT CHANGE UP (ref 150–400)
POTASSIUM SERPL-MCNC: 3.4 MMOL/L — LOW (ref 3.5–5.3)
POTASSIUM SERPL-SCNC: 3.4 MMOL/L — LOW (ref 3.5–5.3)
RBC # BLD: 4.37 M/UL — SIGNIFICANT CHANGE UP (ref 3.8–5.2)
RBC # FLD: 15.3 % — HIGH (ref 10.3–14.5)
SODIUM SERPL-SCNC: 137 MMOL/L — SIGNIFICANT CHANGE UP (ref 135–145)
WBC # BLD: 9.09 K/UL — SIGNIFICANT CHANGE UP (ref 3.8–10.5)
WBC # FLD AUTO: 9.09 K/UL — SIGNIFICANT CHANGE UP (ref 3.8–10.5)

## 2023-08-11 PROCEDURE — 82553 CREATINE MB FRACTION: CPT

## 2023-08-11 PROCEDURE — 99239 HOSP IP/OBS DSCHRG MGMT >30: CPT

## 2023-08-11 PROCEDURE — 85027 COMPLETE CBC AUTOMATED: CPT

## 2023-08-11 PROCEDURE — C1887: CPT

## 2023-08-11 PROCEDURE — 83735 ASSAY OF MAGNESIUM: CPT

## 2023-08-11 PROCEDURE — C1894: CPT

## 2023-08-11 PROCEDURE — 80048 BASIC METABOLIC PNL TOTAL CA: CPT

## 2023-08-11 PROCEDURE — 82550 ASSAY OF CK (CPK): CPT

## 2023-08-11 PROCEDURE — C1874: CPT

## 2023-08-11 PROCEDURE — 85025 COMPLETE CBC W/AUTO DIFF WBC: CPT

## 2023-08-11 PROCEDURE — 85730 THROMBOPLASTIN TIME PARTIAL: CPT

## 2023-08-11 PROCEDURE — C1769: CPT

## 2023-08-11 PROCEDURE — 80061 LIPID PANEL: CPT

## 2023-08-11 PROCEDURE — 36415 COLL VENOUS BLD VENIPUNCTURE: CPT

## 2023-08-11 PROCEDURE — 80053 COMPREHEN METABOLIC PANEL: CPT

## 2023-08-11 PROCEDURE — 85610 PROTHROMBIN TIME: CPT

## 2023-08-11 PROCEDURE — 85347 COAGULATION TIME ACTIVATED: CPT

## 2023-08-11 PROCEDURE — C1753: CPT

## 2023-08-11 PROCEDURE — C1725: CPT

## 2023-08-11 PROCEDURE — 83036 HEMOGLOBIN GLYCOSYLATED A1C: CPT

## 2023-08-11 PROCEDURE — 93005 ELECTROCARDIOGRAM TRACING: CPT

## 2023-08-11 RX ORDER — SODIUM CHLORIDE 9 MG/ML
1000 INJECTION INTRAMUSCULAR; INTRAVENOUS; SUBCUTANEOUS
Refills: 0 | Status: DISCONTINUED | OUTPATIENT
Start: 2023-08-11 | End: 2023-08-11

## 2023-08-11 RX ORDER — POTASSIUM CHLORIDE 20 MEQ
40 PACKET (EA) ORAL ONCE
Refills: 0 | Status: COMPLETED | OUTPATIENT
Start: 2023-08-11 | End: 2023-08-11

## 2023-08-11 RX ORDER — ISOSORBIDE MONONITRATE 60 MG/1
1 TABLET, EXTENDED RELEASE ORAL
Qty: 0 | Refills: 0 | DISCHARGE
Start: 2023-08-11

## 2023-08-11 RX ORDER — TICAGRELOR 90 MG/1
1 TABLET ORAL
Qty: 60 | Refills: 11
Start: 2023-08-11 | End: 2024-08-04

## 2023-08-11 RX ORDER — ATORVASTATIN CALCIUM 80 MG/1
1 TABLET, FILM COATED ORAL
Qty: 30 | Refills: 5
Start: 2023-08-11 | End: 2024-02-06

## 2023-08-11 RX ORDER — NIFEDIPINE 30 MG
1 TABLET, EXTENDED RELEASE 24 HR ORAL
Qty: 30 | Refills: 3
Start: 2023-08-11 | End: 2023-12-08

## 2023-08-11 RX ORDER — ASPIRIN/CALCIUM CARB/MAGNESIUM 324 MG
1 TABLET ORAL
Qty: 30 | Refills: 11
Start: 2023-08-11 | End: 2024-08-04

## 2023-08-11 RX ORDER — MAGNESIUM OXIDE 400 MG ORAL TABLET 241.3 MG
400 TABLET ORAL ONCE
Refills: 0 | Status: COMPLETED | OUTPATIENT
Start: 2023-08-11 | End: 2023-08-11

## 2023-08-11 RX ORDER — NIFEDIPINE 30 MG
1 TABLET, EXTENDED RELEASE 24 HR ORAL
Qty: 30 | Refills: 5
Start: 2023-08-11 | End: 2024-02-06

## 2023-08-11 RX ORDER — ATORVASTATIN CALCIUM 80 MG/1
1 TABLET, FILM COATED ORAL
Qty: 0 | Refills: 0 | DISCHARGE
Start: 2023-08-11

## 2023-08-11 RX ORDER — NIFEDIPINE 30 MG
1 TABLET, EXTENDED RELEASE 24 HR ORAL
Qty: 0 | Refills: 0 | DISCHARGE
Start: 2023-08-11

## 2023-08-11 RX ORDER — EZETIMIBE 10 MG/1
1 TABLET ORAL
Qty: 30 | Refills: 5
Start: 2023-08-11 | End: 2024-02-06

## 2023-08-11 RX ORDER — ISOSORBIDE MONONITRATE 60 MG/1
1 TABLET, EXTENDED RELEASE ORAL
Qty: 30 | Refills: 3
Start: 2023-08-11 | End: 2023-12-08

## 2023-08-11 RX ORDER — CARVEDILOL PHOSPHATE 80 MG/1
1 CAPSULE, EXTENDED RELEASE ORAL
Qty: 60 | Refills: 3
Start: 2023-08-11 | End: 2023-12-08

## 2023-08-11 RX ORDER — CARVEDILOL PHOSPHATE 80 MG/1
1 CAPSULE, EXTENDED RELEASE ORAL
Qty: 60 | Refills: 5
Start: 2023-08-11 | End: 2024-02-06

## 2023-08-11 RX ORDER — ASPIRIN/CALCIUM CARB/MAGNESIUM 324 MG
1 TABLET ORAL
Qty: 0 | Refills: 0 | DISCHARGE
Start: 2023-08-11

## 2023-08-11 RX ORDER — TICAGRELOR 90 MG/1
1 TABLET ORAL
Qty: 0 | Refills: 0 | DISCHARGE
Start: 2023-08-11

## 2023-08-11 RX ORDER — POTASSIUM CHLORIDE 20 MEQ
20 PACKET (EA) ORAL ONCE
Refills: 0 | Status: COMPLETED | OUTPATIENT
Start: 2023-08-11 | End: 2023-08-11

## 2023-08-11 RX ORDER — ISOSORBIDE MONONITRATE 60 MG/1
1 TABLET, EXTENDED RELEASE ORAL
Qty: 30 | Refills: 5
Start: 2023-08-11 | End: 2024-02-06

## 2023-08-11 RX ORDER — TICAGRELOR 90 MG/1
1 TABLET ORAL
Qty: 60 | Refills: 0
Start: 2023-08-11 | End: 2023-09-09

## 2023-08-11 RX ORDER — CARVEDILOL PHOSPHATE 80 MG/1
1 CAPSULE, EXTENDED RELEASE ORAL
Qty: 0 | Refills: 0 | DISCHARGE
Start: 2023-08-11

## 2023-08-11 RX ADMIN — CARVEDILOL PHOSPHATE 6.25 MILLIGRAM(S): 80 CAPSULE, EXTENDED RELEASE ORAL at 07:28

## 2023-08-11 RX ADMIN — SODIUM CHLORIDE 150 MILLILITER(S): 9 INJECTION INTRAMUSCULAR; INTRAVENOUS; SUBCUTANEOUS at 00:59

## 2023-08-11 RX ADMIN — Medication 1 PATCH: at 12:23

## 2023-08-11 RX ADMIN — TICAGRELOR 90 MILLIGRAM(S): 90 TABLET ORAL at 07:29

## 2023-08-11 RX ADMIN — Medication 1 PATCH: at 00:43

## 2023-08-11 RX ADMIN — Medication 20 MILLIEQUIVALENT(S): at 10:59

## 2023-08-11 RX ADMIN — ISOSORBIDE MONONITRATE 30 MILLIGRAM(S): 60 TABLET, EXTENDED RELEASE ORAL at 12:21

## 2023-08-11 RX ADMIN — Medication 81 MILLIGRAM(S): at 12:21

## 2023-08-11 RX ADMIN — Medication 60 MILLIGRAM(S): at 07:29

## 2023-08-11 RX ADMIN — QUETIAPINE FUMARATE 25 MILLIGRAM(S): 200 TABLET, FILM COATED ORAL at 12:22

## 2023-08-11 RX ADMIN — Medication 1 PATCH: at 07:00

## 2023-08-11 RX ADMIN — Medication 40 MILLIEQUIVALENT(S): at 10:58

## 2023-08-11 RX ADMIN — Medication 1000 UNIT(S): at 12:21

## 2023-08-11 RX ADMIN — Medication 1 MILLIGRAM(S): at 12:21

## 2023-08-11 RX ADMIN — MAGNESIUM OXIDE 400 MG ORAL TABLET 400 MILLIGRAM(S): 241.3 TABLET ORAL at 10:59

## 2023-08-11 RX ADMIN — ATORVASTATIN CALCIUM 80 MILLIGRAM(S): 80 TABLET, FILM COATED ORAL at 00:43

## 2023-08-11 RX ADMIN — Medication 40 MILLIGRAM(S): at 12:20

## 2023-08-11 NOTE — DISCHARGE NOTE PROVIDER - NSDCCPCAREPLAN_GEN_ALL_CORE_FT
PRINCIPAL DISCHARGE DIAGNOSIS  Diagnosis: CAD (coronary artery disease)  Assessment and Plan of Treatment: You were found to have blockages in the arteries of your heart, also known as Coronary Artery Disease. You underwent a cardiac catheterization on 8/10/23 and received a stent to the left circumflex and ostial OM1 artery.  PLEASE CONTINUE ASPIRIN 81MG DAILY AND START RILINTA 90MG TWICE DAILY. PLEASE STOP PLAVIX 75MG DAILY. DO NOT STOP THESE MEDICATIONS FOR ANY REASON AS THEY ARE KEEPING YOUR STENT OPEN AND PREVENTING A HEART ATTACK.   Avoid strenuous activity or heavy lifting anything more than 5lbs for the next five days. Do not take a bath or swim for the next five days; you may shower. For any bleeding or hematoma formation (hardened blood collection under the skin) at the access site of your wrist, hold pressure and go to the emergency room. Please follow up with Dr. Frausto in 1-2 weeks. For recurrent chest pain, please call your doctor or go to the emergency room.         SECONDARY DISCHARGE DIAGNOSES  Diagnosis: HTN (hypertension)  Assessment and Plan of Treatment: Your blood pressure was elevated during your hospitalization so we made some modifications to your home medications. Please STOP Toprol 25mg and START Carvedilol 6.25mg twice daily. Please STOP Norvasc 10mg and START Nifedipine 60mg daily. Please STOP hydralazine 25mg and START Imdur 30mg daily.    Diagnosis: HLD (hyperlipidemia)  Assessment and Plan of Treatment: Please continue atorvastatin 80mg daily and START zetia 10mg daily.        PRINCIPAL DISCHARGE DIAGNOSIS  Diagnosis: CAD (coronary artery disease)  Assessment and Plan of Treatment: You were found to have blockages in the arteries of your heart, also known as Coronary Artery Disease. You underwent a cardiac catheterization on 8/10/23 and received a stent to the left circumflex and ostial OM1 artery.  Please STOP PLAVIX.  PLEASE CONTINUE ASPIRIN 81MG DAILY AND START BRILINTA 90MG TWICE DAILY. DO NOT STOP THESE MEDICATIONS FOR ANY REASON AS THEY ARE KEEPING YOUR STENT OPEN AND PREVENTING A HEART ATTACK.   Avoid strenuous activity or heavy lifting anything more than 5lbs for the next five days. Do not take a bath or swim for the next five days; you may shower. For any bleeding or hematoma formation (hardened blood collection under the skin) at the access site of your wrist, hold pressure and go to the emergency room. Please follow up with Dr. Frausto in 1-2 weeks. For recurrent chest pain, please call your doctor or go to the emergency room.         SECONDARY DISCHARGE DIAGNOSES  Diagnosis: HLD (hyperlipidemia)  Assessment and Plan of Treatment: Please continue atorvastatin 80mg daily and START zetia 10mg daily.       Diagnosis: HTN (hypertension)  Assessment and Plan of Treatment: Your blood pressure was elevated during your hospitalization so we made some modifications to your home medications. Please STOP Toprol 25mg and START Carvedilol 6.25mg twice daily. Please STOP Norvasc 10mg and START Nifedipine 60mg daily. Please STOP hydralazine 25mg and START Imdur 30mg daily.

## 2023-08-11 NOTE — DISCHARGE NOTE NURSING/CASE MANAGEMENT/SOCIAL WORK - NSDCVIVACCINE_GEN_ALL_CORE_FT
influenza, injectable, quadrivalent, preservative free; 06-Nov-2015 11:59; Becki Golden (RN); [employer]; [id number]; IntraMuscular; Deltoid Right.; 0.5 milliLiter(s); VIS (VIS Published: 07-Aug-2015, VIS Presented: 06-Nov-2015);

## 2023-08-11 NOTE — DISCHARGE NOTE PROVIDER - HOSPITAL COURSE
62F, current smoker, w/ PMHx Obesity, HTN, HLD, HFpEF (EF 63%), pre-DM, CKD, ALKA (non compliance w/ CPAP), TIA (2012), and chronic bronchitis, known CAD (s/p recent cardiac cath 5/23/23 w/ Dr Frausto: PAZ x 3 to p/m/d RCA with residual pLAD 70-75%, mLAD 70-75% (iFR + 0.85), OM2 85-90%, ostial OM1 60%) and now returns for staged PCI. Pt initially presented her outpatient cardiologist Dr. Frausto for preop risk assessment for left knee arthroscopy and was found with subsequent abnormal NST 5/17/23 (moderate reversible perfusion defect of the inferior septal inferolateral wall most consistent with ischemia of the RCA versus left circumflex, EF 48% by nuclear) and Echo 4/1/23 (LVEF 63% w/ grade 1 DD) who  presented to [facility] for recommended cardiac cath.     Pt now s/p LHC with PAZ x 1 to dLCX (90%), PAZ x1 to ostial OM1 (90%), PTCA/ balloon to midLCX (85-90%), LM mild disease, pLAD 60-65%. Intraprocedure, pt had elevated BP and was started on a nitroglycerin gtt. Once the drip was d/willow, pt was started on carvedilol 6.25mg BID, nifedipine 60mg QD,  and Imdur 30mg QD. Home hydralazine, nifedipine and Toprol were discontinued. Pt admitted overnight for observation and telemetry monitoring. Pt seen and examined at bedside this AM without any complaints or events overnight, VSS, labs and telemetry reviewed and pt stable for discharge as discussed with Dr. Clemente. Pt has received appropriate discharge instructions, including medication regimen, access site management and follow up with Dr. Frausto in 1-2 weeks.    Discharge medications: ASA 81mg QD, Brilinta 90mg BID, Carvedilol 6.25mg BID, Lipitor 80mg QD, Zetia 10mg QD, Nifedipine XL 60mg QD, Imdur 30mg QD    Cardiac Rehab (Post PCI): Education on benefits of Cardiac Rehab provided to patient: Yes, Referral and Prescription Given for Cardiac Rehab: Yes, Pt given list of locations & instructed to contact their insurance company to review list of participating providers.
01-Jan-2018

## 2023-08-11 NOTE — DISCHARGE NOTE PROVIDER - NSDCFUADDINST_GEN_ALL_CORE_FT
Please continue to follow a heart healthy diet, low in sodium, cholesterol and fats.   We recommend a Mediterranean diet:  -Incorporate 2 or more servings per day of vegetables, fruits, and whole grains  -Increase intake of fish and legumes/beans to 2 or more servings per week  -Increase intake of healthy fats, such as olive oil and avocados, and have a handful of nuts/seeds most days  -Reduce red/processed meat consumption to 2 or fewer times per week     We have provided you with a prescription for cardiac rehab which is medically supervised exercise program for your heart and has been shown to improve the quantity and quality of life of people with heart disease like yours. You should attend cardiac rehab 3 times per week for 12 weeks. We have provided you with a list of nearby facilities. Please call your insurance carrier to determine which of these facilities are covered under your plan. Please bring this prescription with you to your follow up appointment with your cardiologist who can then further assist you to enroll into a cardiac rehab program.

## 2023-08-11 NOTE — PROVIDER CONTACT NOTE (OTHER) - BACKGROUND
Patient admitted from cath lab holding where she had 1x PAZ placed, R radial access. Patient had elevated bp s/o nitro gtt and now admitted to 5 uris.

## 2023-08-11 NOTE — DISCHARGE NOTE PROVIDER - NSDCMRMEDTOKEN_GEN_ALL_CORE_FT
Abilify 2 mg oral tablet: 1 tab(s) orally once a day  aspirin 81 mg oral delayed release tablet: 1 tab(s) orally once a day  atorvastatin 80 mg oral tablet: 1 tab(s) orally once a day (at bedtime)  Brilinta (ticagrelor) 90 mg oral tablet: 1 tab(s) orally 2 times a day  Cardiac Rehab: 2-3x per week for 12 weeks. Dx: CAD s/p PCI. Cardiologist: Dr. Frausto  carvedilol 6.25 mg oral tablet: 1 tab(s) orally every 12 hours  folic acid 1 mg oral tablet: 1 tab(s) orally once a day  isosorbide mononitrate 30 mg oral tablet, extended release: 1 tab(s) orally once a day  nicotine 7 mg/24 hr transdermal film, extended release: 1 patch transdermally once a day  NIFEdipine 60 mg oral tablet, extended release: 1 tab(s) orally once a day  PROzac 40 mg oral capsule: 1 cap(s) orally once a day  Seroquel 25 mg oral tablet: 1 tab(s) orally once a day  Vitamin B12 100 mcg oral tablet: 1 tab(s) orally once a day  Vitamin D3 1000 intl units (25 mcg) oral capsule: 1 cap(s) orally once a day  Zetia 10 mg oral tablet: 1 tab(s) orally once a day   Abilify 2 mg oral tablet: 1 tab(s) orally once a day  aspirin 81 mg oral delayed release tablet: 1 tab(s) orally once a day  atorvastatin 80 mg oral tablet: 1 tab(s) orally once a day (at bedtime)  Brilinta (ticagrelor) 90 mg oral tablet: 1 tab(s) orally 2 times a day  Cardiac Rehab: 2-3x per week for 12 weeks. Dx: CAD s/p PCI. Cardiologist: Dr. Frausto  carvedilol 6.25 mg oral tablet: 1 tab(s) orally every 12 hours  folic acid 1 mg oral tablet: 1 tab(s) orally once a day  isosorbide mononitrate 30 mg oral tablet, extended release: 1 tab(s) orally once a day  nicotine 7 mg/24 hr transdermal film, extended release: 1 patch transdermally once a day  NIFEdipine 60 mg oral tablet, extended release: 1 tab(s) orally once a day  PROzac 40 mg oral capsule: 1 cap(s) orally once a day  Seroquel 25 mg oral tablet: 1 tab(s) orally once a day  ticagrelor 90 mg oral tablet: 1 tab(s) orally 2 times a day  Vitamin B12 100 mcg oral tablet: 1 tab(s) orally once a day  Vitamin D3 1000 intl units (25 mcg) oral capsule: 1 cap(s) orally once a day  Zetia 10 mg oral tablet: 1 tab(s) orally once a day   Abilify 2 mg oral tablet: 1 tab(s) orally once a day  aspirin 81 mg oral delayed release tablet: 1 tab(s) orally once a day  atorvastatin 80 mg oral tablet: 1 tab(s) orally once a day (at bedtime)  Cardiac Rehab: 2-3x per week for 12 weeks. Dx: CAD s/p PCI. Cardiologist: Dr. Frausto  carvedilol 6.25 mg oral tablet: 1 tab(s) orally every 12 hours  folic acid 1 mg oral tablet: 1 tab(s) orally once a day  isosorbide mononitrate 30 mg oral tablet, extended release: 1 tab(s) orally once a day  nicotine 7 mg/24 hr transdermal film, extended release: 1 patch transdermally once a day  NIFEdipine 60 mg oral tablet, extended release: 1 tab(s) orally once a day  PROzac 40 mg oral capsule: 1 cap(s) orally once a day  Seroquel 25 mg oral tablet: 1 tab(s) orally once a day  ticagrelor 90 mg oral tablet: 1 tab(s) orally every 12 hours  Vitamin B12 100 mcg oral tablet: 1 tab(s) orally once a day  Vitamin D3 1000 intl units (25 mcg) oral capsule: 1 cap(s) orally once a day  Zetia 10 mg oral tablet: 1 tab(s) orally once a day

## 2023-08-11 NOTE — DISCHARGE NOTE PROVIDER - ATTENDING DISCHARGE PHYSICAL EXAMINATION:
Patient seend and examined at bedside. Reports feeling much better. No recurrent chest discomfort since renea-procedure. Off nitro Gtt since 11 pm last night. BP;s have been at goal since. No events on Tele. Medication list reviewed and extensive conversation had about importance of Medication compliance for both DAPT and anti hypertensive. Pt will follow up with Dr Frausto upon DC.

## 2023-08-11 NOTE — DISCHARGE NOTE NURSING/CASE MANAGEMENT/SOCIAL WORK - NSDCPEFALRISK_GEN_ALL_CORE
For information on Fall & Injury Prevention, visit: [email or web address] OR  [email or web address] OR  https://www.cdc.gov/steadi/patient.html

## 2023-08-11 NOTE — DISCHARGE NOTE PROVIDER - CARE PROVIDER_API CALL
Jovi Frausto  Cardiovascular Disease  [address], [place], [address]  [place] [address]  Phone: [phone]  Fax: [phone]  Follow Up Time: 1 week

## 2023-08-11 NOTE — DISCHARGE NOTE NURSING/CASE MANAGEMENT/SOCIAL WORK - PATIENT PORTAL LINK FT
You can access the FollowMyHealth Patient Portal offered by [facility] by registering at the following website: [email or web address]. By joining [email or web address]’s FollowMyHealth portal, you will also be able to view your health information using other applications (apps) compatible with our system.

## 2023-08-11 NOTE — PATIENT PROFILE ADULT - FALL HARM RISK - HARM RISK INTERVENTIONS
Assistance with ambulation/Assistance OOB with selected safe patient handling equipment/Communicate Risk of Fall with Harm to all staff/Discuss with provider need for PT consult/Monitor gait and stability/Provide patient with walking aids - walker, cane, crutches/Reinforce activity limits and safety measures with patient and family/Sit up slowly, dangle for a short time, stand at bedside before walking/Tailored Fall Risk Interventions/Use of alarms - bed, chair and/or voice tab/Visual Cue: Yellow wristband and red socks/Bed in lowest position, wheels locked, appropriate side rails in place/Call bell, personal items and telephone in reach/Instruct patient to call for assistance before getting out of bed or chair/Non-slip footwear when patient is out of bed/Agenda to call system/Physically safe environment - no spills, clutter or unnecessary equipment/Purposeful Proactive Rounding/Room/bathroom lighting operational, light cord in reach

## 2023-08-14 ENCOUNTER — TRANSCRIPTION ENCOUNTER (OUTPATIENT)
Age: 63
End: 2023-08-14

## 2023-08-16 DIAGNOSIS — R73.03 PREDIABETES: ICD-10-CM

## 2023-08-16 DIAGNOSIS — F32.A DEPRESSION, UNSPECIFIED: ICD-10-CM

## 2023-08-16 DIAGNOSIS — I13.0 HYPERTENSIVE HEART AND CHRONIC KIDNEY DISEASE WITH HEART FAILURE AND STAGE 1 THROUGH STAGE 4 CHRONIC KIDNEY DISEASE, OR UNSPECIFIED CHRONIC KIDNEY DISEASE: ICD-10-CM

## 2023-08-16 DIAGNOSIS — Z90.711 ACQUIRED ABSENCE OF UTERUS WITH REMAINING CERVICAL STUMP: ICD-10-CM

## 2023-08-16 DIAGNOSIS — Z79.899 OTHER LONG TERM (CURRENT) DRUG THERAPY: ICD-10-CM
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
## 2023-08-16 DIAGNOSIS — Z79.82 LONG TERM (CURRENT) USE OF ASPIRIN: ICD-10-CM

## 2023-08-16 DIAGNOSIS — G47.33 OBSTRUCTIVE SLEEP APNEA (ADULT) (PEDIATRIC): ICD-10-CM

## 2023-08-16 DIAGNOSIS — F17.200 NICOTINE DEPENDENCE, UNSPECIFIED, UNCOMPLICATED: ICD-10-CM

## 2023-08-16 DIAGNOSIS — N18.9 CHRONIC KIDNEY DISEASE, UNSPECIFIED: ICD-10-CM

## 2023-08-16 DIAGNOSIS — T39.016A UNDERDOSING OF ASPIRIN, INITIAL ENCOUNTER: ICD-10-CM

## 2023-08-16 DIAGNOSIS — F41.9 ANXIETY DISORDER, UNSPECIFIED: ICD-10-CM

## 2023-08-16 DIAGNOSIS — I50.32 CHRONIC DIASTOLIC (CONGESTIVE) HEART FAILURE: ICD-10-CM

## 2023-08-16 DIAGNOSIS — Z91.148 PATIENT'S OTHER NONCOMPLIANCE WITH MEDICATION REGIMEN FOR OTHER REASON: ICD-10-CM

## 2023-08-16 DIAGNOSIS — Z91.199 PATIENT'S NONCOMPLIANCE WITH OTHER MEDICAL TREATMENT AND REGIMEN DUE TO UNSPECIFIED REASON: ICD-10-CM

## 2023-08-16 DIAGNOSIS — E66.01 MORBID (SEVERE) OBESITY DUE TO EXCESS CALORIES: ICD-10-CM

## 2023-08-16 DIAGNOSIS — I25.10 ATHEROSCLEROTIC HEART DISEASE OF NATIVE CORONARY ARTERY WITHOUT ANGINA PECTORIS: ICD-10-CM

## 2023-08-16 DIAGNOSIS — J42 UNSPECIFIED CHRONIC BRONCHITIS: ICD-10-CM

## 2023-08-16 DIAGNOSIS — Z79.02 LONG TERM (CURRENT) USE OF ANTITHROMBOTICS/ANTIPLATELETS: ICD-10-CM

## 2023-08-16 DIAGNOSIS — K44.9 DIAPHRAGMATIC HERNIA WITHOUT OBSTRUCTION OR GANGRENE: ICD-10-CM
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
## 2023-08-16 DIAGNOSIS — Z96.651 PRESENCE OF RIGHT ARTIFICIAL KNEE JOINT: ICD-10-CM

## 2023-08-16 DIAGNOSIS — Z86.73 PERSONAL HISTORY OF TRANSIENT ISCHEMIC ATTACK (TIA), AND CEREBRAL INFARCTION WITHOUT RESIDUAL DEFICITS: ICD-10-CM

## 2023-08-16 DIAGNOSIS — E78.5 HYPERLIPIDEMIA, UNSPECIFIED: ICD-10-CM

## 2023-08-16 DIAGNOSIS — Z95.5 PRESENCE OF CORONARY ANGIOPLASTY IMPLANT AND GRAFT: ICD-10-CM

## 2024-08-22 NOTE — H&P ADULT - RS GEN PE MLT RESP DETAILS PC
Patient calling to get a referral to [facility] for her annual body check. She has an appt on 8/29. Please call when ready thanks    respirations non-labored/airway patent/good air movement/breath sounds equal

## 2025-05-19 NOTE — H&P PST ADULT - HISTORY OF PRESENT ILLNESS
Sosedi message sent to parent, including Dr. Sanderson's response.   62 yr old pleasant female with history of HTN, HDL, morbidly obese ( BMI 38) Sleep Apnea on CPAP uses routinely. Pt states" bad arthritis in my knee walking bone on bone. Pt had trouble with left knee sometimes popping sound. Pt walks with cane and uneven gait. Pt scheduled for left knee arthroscopy on 5/16/2023. Pt had right knee and left shoulder replacements in the past. Pt had medical and cardio clearance done.

## 2025-07-02 ENCOUNTER — APPOINTMENT (OUTPATIENT)
Dept: UROGYNECOLOGY | Facility: CLINIC | Age: 65
End: 2025-07-02

## 2025-07-15 ENCOUNTER — NON-APPOINTMENT (OUTPATIENT)
Age: 65
End: 2025-07-15

## 2025-07-15 ENCOUNTER — APPOINTMENT (OUTPATIENT)
Dept: SURGICAL ONCOLOGY | Facility: CLINIC | Age: 65
End: 2025-07-15
Payer: MEDICARE

## 2025-07-15 VITALS
WEIGHT: 205 LBS | SYSTOLIC BLOOD PRESSURE: 181 MMHG | BODY MASS INDEX: 32.95 KG/M2 | DIASTOLIC BLOOD PRESSURE: 100 MMHG | OXYGEN SATURATION: 96 % | HEIGHT: 66 IN | HEART RATE: 93 BPM

## 2025-07-15 PROBLEM — Z80.42 FAMILY HISTORY OF MALIGNANT NEOPLASM OF PROSTATE: Status: ACTIVE | Noted: 2025-07-15

## 2025-07-15 PROBLEM — C77.9 METASTATIC ADENOCARCINOMA TO LYMPH NODE: Status: ACTIVE | Noted: 2025-07-15

## 2025-07-15 PROBLEM — Z80.8 FAMILY HISTORY OF MALIGNANT NEOPLASM OF BRAIN: Status: ACTIVE | Noted: 2025-07-15

## 2025-07-15 PROBLEM — F17.200 CURRENT EVERY DAY SMOKER: Status: ACTIVE | Noted: 2025-07-15

## 2025-07-15 PROCEDURE — 99205 OFFICE O/P NEW HI 60 MIN: CPT | Mod: 25

## 2025-07-15 PROCEDURE — G2212 PROLONG OUTPT/OFFICE VIS: CPT

## 2025-07-18 ENCOUNTER — RESULT REVIEW (OUTPATIENT)
Age: 65
End: 2025-07-18

## 2025-07-18 NOTE — PROGRESS NOTE ADULT - ASSESSMENT
Patient called requesting refill on adderall  
58 y/o F w/ PMHx of HTN, HLD, COPD, former smoker (quit 2 yrs ago; 35 pack yrs), presenting with chest pain and dyspnea. Is unable to lay flat as gets significantly dyspneic. States she went OSH ([facility]) due to the worsening LE edema about 2 weeks ago. States she went to see her cardiologist (Dr. Gutierrez) yesterday 2/6 at which time he did an echo; patient was advised to go to ED for admission as was found to have pleural effusions on echo.       + Chest pain  + Dyspnea
58 y/o F w/ PMHx of HTN, HLD, COPD, former smoker (quit 2 yrs ago; 35 pack yrs), presenting with chest pain and dyspnea. Is unable to lay flat as gets significantly dyspneic. States she went OSH ([facility]) due to the worsening LE edema about 2 weeks ago. States she went to see her cardiologist (Dr. Gutierrez) yesterday 2/6 at which time he did an echo; patient was advised to go to ED for admission as was found to have pleural effusions on echo.       + Chest pain  + Dyspnea
58 y/o F w/ PMHx of HTN, HLD, COPD, former smoker (quit 2 yrs ago; 35 pack yrs), presenting with chest pain and dyspnea. Is unable to lay flat as gets significantly dyspneic. States she went OSH ([facility]) due to the worsening LE edema about 2 weeks ago. States she went to see her cardiologist (Dr. Gutierrez) yesterday 2/6 at which time he did an echo; patient was advised to go to ED for admission as was found to have pleural effusions on echo.       + Chest pain  + Dyspnea
58 y/o F w/ PMHx of HTN, HLD, COPD, former smoker (quit 2 yrs ago; 35 pack yrs), presenting with chest pain and dyspnea. Patient states chest pain and dyspnea started 1.5 weeks ago
58 y/o F w/ PMHx of HTN, HLD, COPD, former smoker (quit 2 yrs ago; 35 pack yrs), presenting with chest pain and dyspnea. Patient states chest pain and dyspnea started 1.5 weeks ago. Chest pain feels like a squeezing sensation around her heart; denies any radiation of pain. States chest pain is constant but is exacerbated with exertion. SOB is also exacerbated by exertion. Can walk about 1/2 block before getting SOB and experiencing worsening chest pain. States she has also been having lower extremity edema which started 2 weeks ago. Now LE edema is more improved but denies taking any diuretics. Is unable to lay flat as gets significantly dyspneic. States she went OSH ([facility]) due to the worsening LE edema about 2 weeks ago. Per patient was only hospitalized 1 day and was told to follow up outpatient with cardiologist. States she went to see her cardiologist (Dr. Gutierrez) yesterday 2/6 at which time he did an echo; patient was advised to go to ED for admission as was found to have pleural effusions on echo. Patient denies fever, chills, significant wt loss/gain, sputum production, palpitations, abdominal pain, N/V/D, dysuria, recent travel, or sick contacts.     Problem/Plan - 1:  ·  Problem: Chest pain of unknown etiology.  Plan: NO more CP .   Trop neg .  TTE< from: Transthoracic Echocardiogram (02.10.20 @ 09:59) >  CONCLUSIONS:  1. Mitral annular calcification, otherwise normal mitral  valve.Minimal mitral regurgitation.  2. Normal left ventricular internal dimensions and wall  thicknesses.  3. Normal left ventricular systolic function. No segmental  wall motion abnormalities.  4. Mild diastolic dysfunction (Stage I).  5. Normal right ventricular size and function.    Cardiology helping. S/P  NST.< from: Nuclear Stress Test-Pharmacologic (02.11.20 @ 09:32) >  IMPRESSIONS:Normal Study  * Myocardial Perfusion SPECT results are normal.  * Review of raw data shows: The study is of adequate  technical quality  * The left ventricle was normal in size. Normal myocardial  perfusion scan,with no evidence of infarction or inducible  ischemia.  * Post-stress gated wall motion analysis was performed  (LVEF = 58 %;LVEDV = 94 ml.), revealing normal LV systolic  function. NOTE: the LV time activity curve suggested  diastolic dysfunction. RV function appeared normal.    < end of copied text >        Problem/Plan - 2:  ·  Problem: Dyspnea.  Plan: CT chest noted.   Pulmonary consult noted.      Problem/Plan - 3:  ·  Problem: Hypertensive urgency with Adrenal Adenoma  Plan: BP readings better. Awaiting Renal doppler.   Outpt follow up and further evaluation.   Continue home meds  Monitor BP q4 hrs  Sodium restriction.      Problem/Plan - 4:  ·  Problem: Mixed hyperlipidemia.  Plan: Continue Crestor  Check FLP  DASH diet.      Problem/Plan - 5:  ·  Problem: ALKA   Plan: On CPAP
58 y/o F w/ PMHx of HTN, HLD, COPD, former smoker (quit 2 yrs ago; 35 pack yrs), presenting with chest pain and dyspnea. Patient states chest pain and dyspnea started 1.5 weeks ago. Chest pain feels like a squeezing sensation around her heart; denies any radiation of pain. States chest pain is constant but is exacerbated with exertion. SOB is also exacerbated by exertion. Can walk about 1/2 block before getting SOB and experiencing worsening chest pain. States she has also been having lower extremity edema which started 2 weeks ago. Now LE edema is more improved but denies taking any diuretics. Is unable to lay flat as gets significantly dyspneic. States she went OSH ([facility]) due to the worsening LE edema about 2 weeks ago. Per patient was only hospitalized 1 day and was told to follow up outpatient with cardiologist. States she went to see her cardiologist (Dr. Gutierrez) yesterday 2/6 at which time he did an echo; patient was advised to go to ED for admission as was found to have pleural effusions on echo. Patient denies fever, chills, significant wt loss/gain, sputum production, palpitations, abdominal pain, N/V/D, dysuria, recent travel, or sick contacts.     Problem/Plan - 1:  ·  Problem: Chest pain of unknown etiology.  Plan: NO more CP .   Trop neg .  TTE< from: Transthoracic Echocardiogram (02.10.20 @ 09:59) >  CONCLUSIONS:  1. Mitral annular calcification, otherwise normal mitral  valve.Minimal mitral regurgitation.  2. Normal left ventricular internal dimensions and wall  thicknesses.  3. Normal left ventricular systolic function. No segmental  wall motion abnormalities.  4. Mild diastolic dysfunction (Stage I).  5. Normal right ventricular size and function.    Cardiology helping. S/P  NST.< from: Nuclear Stress Test-Pharmacologic (02.11.20 @ 09:32) >  IMPRESSIONS:Normal Study  * Myocardial Perfusion SPECT results are normal.  * Review of raw data shows: The study is of adequate  technical quality  * The left ventricle was normal in size. Normal myocardial  perfusion scan,with no evidence of infarction or inducible  ischemia.  * Post-stress gated wall motion analysis was performed  (LVEF = 58 %;LVEDV = 94 ml.), revealing normal LV systolic  function. NOTE: the LV time activity curve suggested  diastolic dysfunction. RV function appeared normal.    < end of copied text >        Problem/Plan - 2:  ·  Problem: Dyspnea.  Plan: CT chest noted.   Pulmonary consult noted.      Problem/Plan - 3:  ·  Problem: Hypertensive urgency.  Plan: BP readings better. Awaiting Renal doppler.   Continue home meds  Monitor BP q4 hrs  Sodium restriction.      Problem/Plan - 4:  ·  Problem: Mixed hyperlipidemia.  Plan: Continue Crestor  Check FLP  DASH diet.      Problem/Plan - 5:  ·  Problem: ALKA   Plan: On CPAP
60 y/o F w/ PMHx of HTN, HLD, COPD, former smoker (quit 2 yrs ago; 35 pack yrs), presenting with chest pain and dyspnea. Is unable to lay flat as gets significantly dyspneic. States she went OSH ([facility]) due to the worsening LE edema about 2 weeks ago. States she went to see her cardiologist (Dr. Gutierrez) yesterday 2/6 at which time he did an echo; patient was advised to go to ED for admission as was found to have pleural effusions on echo.       + Chest pain  + Dyspnea
60 y/o F w/ PMHx of HTN, HLD, COPD, former smoker (quit 2 yrs ago; 35 pack yrs), presenting with chest pain and dyspnea. Patient states chest pain and dyspnea started 1.5 weeks ago
60 y/o F w/ PMHx of HTN, HLD, COPD, former smoker (quit 2 yrs ago; 35 pack yrs), presenting with chest pain and dyspnea. Patient states chest pain and dyspnea started 1.5 weeks ago. Chest pain feels like a squeezing sensation around her heart; denies any radiation of pain. States chest pain is constant but is exacerbated with exertion. SOB is also exacerbated by exertion. Can walk about 1/2 block before getting SOB and experiencing worsening chest pain. States she has also been having lower extremity edema which started 2 weeks ago. Now LE edema is more improved but denies taking any diuretics. Is unable to lay flat as gets significantly dyspneic. States she went OSH ([facility]) due to the worsening LE edema about 2 weeks ago. Per patient was only hospitalized 1 day and was told to follow up outpatient with cardiologist. States she went to see her cardiologist (Dr. Gutierrez) yesterday 2/6 at which time he did an echo; patient was advised to go to ED for admission as was found to have pleural effusions on echo. Patient denies fever, chills, significant wt loss/gain, sputum production, palpitations, abdominal pain, N/V/D, dysuria, recent travel, or sick contacts.     Problem/Plan - 1:  ·  Problem: Chest pain of unknown etiology.  Plan: NO more CP .   Trop neg .  TTE< from: Transthoracic Echocardiogram (02.10.20 @ 09:59) >  CONCLUSIONS:  1. Mitral annular calcification, otherwise normal mitral  valve.Minimal mitral regurgitation.  2. Normal left ventricular internal dimensions and wall  thicknesses.  3. Normal left ventricular systolic function. No segmental  wall motion abnormalities.  4. Mild diastolic dysfunction (Stage I).  5. Normal right ventricular size and function.    Cardiology helping. Awaiting NST.      Problem/Plan - 2:  ·  Problem: Dyspnea.  Plan: CT chest noted.   Pulmonary consult noted.      Problem/Plan - 3:  ·  Problem: Hypertensive urgency.  Plan: BP readings better.  Continue home meds  Monitor BP q4 hrs  Sodium restriction.      Problem/Plan - 4:  ·  Problem: Mixed hyperlipidemia.  Plan: Continue Crestor  Check FLP  DASH diet.      Problem/Plan - 5:  ·  Problem: ALKA   Plan: On CPAP

## 2025-07-22 ENCOUNTER — APPOINTMENT (OUTPATIENT)
Dept: HEMATOLOGY ONCOLOGY | Facility: CLINIC | Age: 65
End: 2025-07-22

## 2025-07-23 ENCOUNTER — APPOINTMENT (OUTPATIENT)
Dept: MRI IMAGING | Facility: IMAGING CENTER | Age: 65
End: 2025-07-23
Payer: MEDICARE

## 2025-07-23 ENCOUNTER — APPOINTMENT (OUTPATIENT)
Dept: CT IMAGING | Facility: IMAGING CENTER | Age: 65
End: 2025-07-23
Payer: MEDICARE

## 2025-07-23 ENCOUNTER — OUTPATIENT (OUTPATIENT)
Dept: OUTPATIENT SERVICES | Facility: HOSPITAL | Age: 65
LOS: 1 days | End: 2025-07-23
Payer: COMMERCIAL

## 2025-07-23 DIAGNOSIS — C50.911 MALIGNANT NEOPLASM OF UNSPECIFIED SITE OF RIGHT FEMALE BREAST: ICD-10-CM

## 2025-07-23 DIAGNOSIS — Z98.890 OTHER SPECIFIED POSTPROCEDURAL STATES: Chronic | ICD-10-CM

## 2025-07-23 DIAGNOSIS — Z98.89 OTHER SPECIFIED POSTPROCEDURAL STATES: Chronic | ICD-10-CM

## 2025-07-23 DIAGNOSIS — H33.21 SEROUS RETINAL DETACHMENT, RIGHT EYE: Chronic | ICD-10-CM

## 2025-07-23 DIAGNOSIS — Z98.41 CATARACT EXTRACTION STATUS, RIGHT EYE: Chronic | ICD-10-CM

## 2025-07-23 PROCEDURE — 74177 CT ABD & PELVIS W/CONTRAST: CPT | Mod: 26

## 2025-07-23 PROCEDURE — 71260 CT THORAX DX C+: CPT | Mod: 26

## 2025-07-23 PROCEDURE — 74177 CT ABD & PELVIS W/CONTRAST: CPT

## 2025-07-23 PROCEDURE — A9585: CPT

## 2025-07-23 PROCEDURE — 77049 MRI BREAST C-+ W/CAD BI: CPT | Mod: 26

## 2025-07-23 PROCEDURE — C8908: CPT

## 2025-07-23 PROCEDURE — 71260 CT THORAX DX C+: CPT

## 2025-07-23 PROCEDURE — C8937: CPT

## 2025-07-23 RX ORDER — CHROMIUM 200 MCG
TABLET ORAL
Refills: 0 | Status: ACTIVE | COMMUNITY

## 2025-07-23 RX ORDER — ATORVASTATIN CALCIUM 40 MG/1
40 TABLET, FILM COATED ORAL
Refills: 0 | Status: ACTIVE | COMMUNITY

## 2025-07-23 RX ORDER — VALSARTAN AND HYDROCHLOROTHIAZIDE 160; 12.5 MG/1; MG/1
160-12.5 TABLET, FILM COATED ORAL
Refills: 0 | Status: ACTIVE | COMMUNITY

## 2025-08-04 ENCOUNTER — APPOINTMENT (OUTPATIENT)
Dept: NUCLEAR MEDICINE | Facility: IMAGING CENTER | Age: 65
End: 2025-08-04

## 2025-08-12 ENCOUNTER — NON-APPOINTMENT (OUTPATIENT)
Age: 65
End: 2025-08-12

## 2025-08-14 ENCOUNTER — NON-APPOINTMENT (OUTPATIENT)
Age: 65
End: 2025-08-14

## 2025-08-18 ENCOUNTER — NON-APPOINTMENT (OUTPATIENT)
Age: 65
End: 2025-08-18

## 2025-08-18 ENCOUNTER — APPOINTMENT (OUTPATIENT)
Dept: HEMATOLOGY ONCOLOGY | Facility: CLINIC | Age: 65
End: 2025-08-18
Payer: MEDICARE

## 2025-08-18 ENCOUNTER — RESULT REVIEW (OUTPATIENT)
Age: 65
End: 2025-08-18

## 2025-08-18 VITALS
SYSTOLIC BLOOD PRESSURE: 165 MMHG | HEIGHT: 66.22 IN | OXYGEN SATURATION: 98 % | TEMPERATURE: 97.1 F | WEIGHT: 214.05 LBS | RESPIRATION RATE: 16 BRPM | HEART RATE: 75 BPM | DIASTOLIC BLOOD PRESSURE: 80 MMHG | BODY MASS INDEX: 34.4 KG/M2

## 2025-08-18 DIAGNOSIS — C50.911 MALIGNANT NEOPLASM OF UNSPECIFIED SITE OF RIGHT FEMALE BREAST: ICD-10-CM

## 2025-08-18 LAB
25(OH)D3 SERPL-MCNC: 25.8 NG/ML
ALBUMIN SERPL ELPH-MCNC: 4.5 G/DL
ALP BLD-CCNC: 125 U/L
ALT SERPL-CCNC: 14 U/L
ANION GAP SERPL CALC-SCNC: 14 MMOL/L
APTT BLD: 41.2 SEC
AST SERPL-CCNC: 20 U/L
BILIRUB SERPL-MCNC: 0.2 MG/DL
BUN SERPL-MCNC: 19 MG/DL
CALCIUM SERPL-MCNC: 9.5 MG/DL
CANCER AG27-29 SERPL-ACNC: 17.8 U/ML
CHLORIDE SERPL-SCNC: 103 MMOL/L
CO2 SERPL-SCNC: 26 MMOL/L
CREAT SERPL-MCNC: 0.87 MG/DL
EGFRCR SERPLBLD CKD-EPI 2021: 74 ML/MIN/1.73M2
GLUCOSE SERPL-MCNC: 90 MG/DL
HBV CORE IGG+IGM SER QL: NONREACTIVE
HBV SURFACE AB SER QL: NONREACTIVE
HBV SURFACE AG SER QL: NONREACTIVE
INR PPP: 0.9 RATIO
POTASSIUM SERPL-SCNC: 3.8 MMOL/L
PROT SERPL-MCNC: 7.5 G/DL
PT BLD: 10.5 SEC
SODIUM SERPL-SCNC: 142 MMOL/L
TSH SERPL-ACNC: 0.8 UIU/ML

## 2025-08-18 PROCEDURE — 93000 ELECTROCARDIOGRAM COMPLETE: CPT

## 2025-08-18 PROCEDURE — 99205 OFFICE O/P NEW HI 60 MIN: CPT

## 2025-08-19 ENCOUNTER — NON-APPOINTMENT (OUTPATIENT)
Age: 65
End: 2025-08-19

## 2025-08-19 LAB
HBV DNA # SERPL NAA+PROBE: NOT DETECTED IU/ML
HEPB DNA PCR INT: NOT DETECTED
HEPB DNA PCR LOG: NOT DETECTED LOGIU/ML

## 2025-08-21 ENCOUNTER — NON-APPOINTMENT (OUTPATIENT)
Age: 65
End: 2025-08-21

## 2025-08-28 ENCOUNTER — APPOINTMENT (OUTPATIENT)
Dept: NUCLEAR MEDICINE | Facility: IMAGING CENTER | Age: 65
End: 2025-08-28
Payer: MEDICARE

## 2025-08-28 ENCOUNTER — RESULT REVIEW (OUTPATIENT)
Age: 65
End: 2025-08-28

## 2025-08-28 ENCOUNTER — OUTPATIENT (OUTPATIENT)
Dept: OUTPATIENT SERVICES | Facility: HOSPITAL | Age: 65
LOS: 1 days | End: 2025-08-28
Payer: COMMERCIAL

## 2025-08-28 DIAGNOSIS — Z98.890 OTHER SPECIFIED POSTPROCEDURAL STATES: Chronic | ICD-10-CM

## 2025-08-28 DIAGNOSIS — C50.911 MALIGNANT NEOPLASM OF UNSPECIFIED SITE OF RIGHT FEMALE BREAST: ICD-10-CM

## 2025-08-28 DIAGNOSIS — Z98.41 CATARACT EXTRACTION STATUS, RIGHT EYE: Chronic | ICD-10-CM

## 2025-08-28 DIAGNOSIS — Z98.89 OTHER SPECIFIED POSTPROCEDURAL STATES: Chronic | ICD-10-CM

## 2025-08-28 DIAGNOSIS — C77.9 SECONDARY AND UNSPECIFIED MALIGNANT NEOPLASM OF LYMPH NODE, UNSPECIFIED: ICD-10-CM

## 2025-08-28 DIAGNOSIS — H33.21 SEROUS RETINAL DETACHMENT, RIGHT EYE: Chronic | ICD-10-CM

## 2025-08-28 PROCEDURE — A9561: CPT

## 2025-08-28 PROCEDURE — 78306 BONE IMAGING WHOLE BODY: CPT

## 2025-08-28 PROCEDURE — 78306 BONE IMAGING WHOLE BODY: CPT | Mod: 26

## 2025-08-28 PROCEDURE — 78830 RP LOCLZJ TUM SPECT W/CT 1: CPT | Mod: 26

## 2025-08-28 PROCEDURE — 78830 RP LOCLZJ TUM SPECT W/CT 1: CPT

## 2025-09-08 ENCOUNTER — NON-APPOINTMENT (OUTPATIENT)
Age: 65
End: 2025-09-08

## 2025-09-10 ENCOUNTER — TRANSCRIPTION ENCOUNTER (OUTPATIENT)
Age: 65
End: 2025-09-10

## 2025-09-10 ENCOUNTER — NON-APPOINTMENT (OUTPATIENT)
Age: 65
End: 2025-09-10

## 2025-09-10 ENCOUNTER — APPOINTMENT (OUTPATIENT)
Dept: INTERVENTIONAL RADIOLOGY/VASCULAR | Facility: HOSPITAL | Age: 65
End: 2025-09-10

## 2025-09-10 ENCOUNTER — RESULT REVIEW (OUTPATIENT)
Age: 65
End: 2025-09-10

## 2025-09-15 ENCOUNTER — NON-APPOINTMENT (OUTPATIENT)
Age: 65
End: 2025-09-15

## 2025-09-18 ENCOUNTER — NON-APPOINTMENT (OUTPATIENT)
Age: 65
End: 2025-09-18